# Patient Record
Sex: FEMALE | Race: WHITE | Employment: FULL TIME | URBAN - METROPOLITAN AREA
[De-identification: names, ages, dates, MRNs, and addresses within clinical notes are randomized per-mention and may not be internally consistent; named-entity substitution may affect disease eponyms.]

---

## 2017-01-01 ENCOUNTER — APPOINTMENT (OUTPATIENT)
Dept: NUCLEAR MEDICINE | Age: 57
DRG: 478 | End: 2017-01-01
Attending: NURSE PRACTITIONER
Payer: COMMERCIAL

## 2017-01-01 ENCOUNTER — HOSPITAL ENCOUNTER (OUTPATIENT)
Dept: GENERAL RADIOLOGY | Age: 57
Discharge: HOME | End: 2017-03-09
Payer: COMMERCIAL

## 2017-01-01 ENCOUNTER — APPOINTMENT (OUTPATIENT)
Dept: GENERAL RADIOLOGY | Age: 57
DRG: 542 | End: 2017-01-01
Attending: EMERGENCY MEDICINE
Payer: COMMERCIAL

## 2017-01-01 ENCOUNTER — APPOINTMENT (OUTPATIENT)
Dept: GENERAL RADIOLOGY | Age: 57
DRG: 478 | End: 2017-01-01
Attending: ORTHOPAEDIC SURGERY
Payer: COMMERCIAL

## 2017-01-01 ENCOUNTER — ANESTHESIA (OUTPATIENT)
Dept: SURGERY | Age: 57
DRG: 478 | End: 2017-01-01
Payer: COMMERCIAL

## 2017-01-01 ENCOUNTER — APPOINTMENT (OUTPATIENT)
Dept: ULTRASOUND IMAGING | Age: 57
DRG: 542 | End: 2017-01-01
Attending: INTERNAL MEDICINE
Payer: COMMERCIAL

## 2017-01-01 ENCOUNTER — TELEPHONE (OUTPATIENT)
Dept: INTERNAL MEDICINE CLINIC | Age: 57
End: 2017-01-01

## 2017-01-01 ENCOUNTER — APPOINTMENT (OUTPATIENT)
Dept: ULTRASOUND IMAGING | Age: 57
DRG: 478 | End: 2017-01-01
Attending: EMERGENCY MEDICINE
Payer: COMMERCIAL

## 2017-01-01 ENCOUNTER — APPOINTMENT (OUTPATIENT)
Dept: GENERAL RADIOLOGY | Age: 57
DRG: 478 | End: 2017-01-01
Attending: INTERNAL MEDICINE
Payer: COMMERCIAL

## 2017-01-01 ENCOUNTER — APPOINTMENT (OUTPATIENT)
Dept: CT IMAGING | Age: 57
DRG: 542 | End: 2017-01-01
Attending: PHYSICIAN ASSISTANT
Payer: COMMERCIAL

## 2017-01-01 ENCOUNTER — HOSPITAL ENCOUNTER (INPATIENT)
Age: 57
LOS: 7 days | Discharge: NURSING, REHAB OR LONG-TERM CARE | DRG: 478 | End: 2017-04-11
Attending: EMERGENCY MEDICINE | Admitting: INTERNAL MEDICINE
Payer: COMMERCIAL

## 2017-01-01 ENCOUNTER — HOSPITAL ENCOUNTER (INPATIENT)
Dept: RADIATION THERAPY | Age: 57
Discharge: HOME | DRG: 542 | End: 2017-05-05
Payer: COMMERCIAL

## 2017-01-01 ENCOUNTER — PATIENT OUTREACH (OUTPATIENT)
Dept: INTERNAL MEDICINE CLINIC | Age: 57
End: 2017-01-01

## 2017-01-01 ENCOUNTER — HOSPITAL ENCOUNTER (INPATIENT)
Dept: RADIATION THERAPY | Age: 57
Discharge: HOME | DRG: 542 | End: 2017-05-04
Payer: COMMERCIAL

## 2017-01-01 ENCOUNTER — HOSPITAL ENCOUNTER (INPATIENT)
Dept: RADIATION THERAPY | Age: 57
Discharge: HOME | DRG: 542 | End: 2017-05-02
Payer: COMMERCIAL

## 2017-01-01 ENCOUNTER — APPOINTMENT (OUTPATIENT)
Dept: CT IMAGING | Age: 57
DRG: 478 | End: 2017-01-01
Attending: EMERGENCY MEDICINE
Payer: COMMERCIAL

## 2017-01-01 ENCOUNTER — OFFICE VISIT (OUTPATIENT)
Dept: INTERNAL MEDICINE CLINIC | Age: 57
End: 2017-01-01

## 2017-01-01 ENCOUNTER — APPOINTMENT (OUTPATIENT)
Dept: GENERAL RADIOLOGY | Age: 57
DRG: 542 | End: 2017-01-01
Attending: INTERNAL MEDICINE
Payer: COMMERCIAL

## 2017-01-01 ENCOUNTER — HOSPITAL ENCOUNTER (INPATIENT)
Age: 57
LOS: 18 days | DRG: 542 | End: 2017-05-12
Attending: EMERGENCY MEDICINE | Admitting: FAMILY MEDICINE
Payer: COMMERCIAL

## 2017-01-01 ENCOUNTER — HOSPITAL ENCOUNTER (INPATIENT)
Dept: RADIATION THERAPY | Age: 57
Discharge: HOME | DRG: 542 | End: 2017-04-28
Payer: COMMERCIAL

## 2017-01-01 ENCOUNTER — APPOINTMENT (OUTPATIENT)
Dept: MRI IMAGING | Age: 57
DRG: 542 | End: 2017-01-01
Attending: INTERNAL MEDICINE
Payer: COMMERCIAL

## 2017-01-01 ENCOUNTER — APPOINTMENT (OUTPATIENT)
Dept: CT IMAGING | Age: 57
DRG: 542 | End: 2017-01-01
Attending: HOSPITALIST
Payer: COMMERCIAL

## 2017-01-01 ENCOUNTER — HOSPITAL ENCOUNTER (OUTPATIENT)
Dept: MAMMOGRAPHY | Age: 57
Discharge: HOME | End: 2017-01-26
Attending: SPECIALIST
Payer: COMMERCIAL

## 2017-01-01 ENCOUNTER — APPOINTMENT (OUTPATIENT)
Dept: CT IMAGING | Age: 57
DRG: 542 | End: 2017-01-01
Attending: INTERNAL MEDICINE
Payer: COMMERCIAL

## 2017-01-01 ENCOUNTER — ANESTHESIA EVENT (OUTPATIENT)
Dept: SURGERY | Age: 57
DRG: 478 | End: 2017-01-01
Payer: COMMERCIAL

## 2017-01-01 ENCOUNTER — HOSPITAL ENCOUNTER (INPATIENT)
Dept: RADIATION THERAPY | Age: 57
Discharge: HOME | DRG: 542 | End: 2017-05-03
Payer: COMMERCIAL

## 2017-01-01 ENCOUNTER — APPOINTMENT (OUTPATIENT)
Dept: CT IMAGING | Age: 57
DRG: 542 | End: 2017-01-01
Attending: RADIOLOGY
Payer: COMMERCIAL

## 2017-01-01 ENCOUNTER — APPOINTMENT (OUTPATIENT)
Dept: CT IMAGING | Age: 57
DRG: 542 | End: 2017-01-01
Attending: EMERGENCY MEDICINE
Payer: COMMERCIAL

## 2017-01-01 ENCOUNTER — HOSPITAL ENCOUNTER (INPATIENT)
Dept: RADIATION THERAPY | Age: 57
Discharge: HOME | DRG: 542 | End: 2017-05-08
Payer: COMMERCIAL

## 2017-01-01 VITALS
OXYGEN SATURATION: 97 % | HEIGHT: 62 IN | DIASTOLIC BLOOD PRESSURE: 65 MMHG | SYSTOLIC BLOOD PRESSURE: 101 MMHG | WEIGHT: 158.73 LBS | RESPIRATION RATE: 16 BRPM | TEMPERATURE: 98.2 F | BODY MASS INDEX: 29.21 KG/M2 | HEART RATE: 76 BPM

## 2017-01-01 VITALS
TEMPERATURE: 98.3 F | SYSTOLIC BLOOD PRESSURE: 118 MMHG | DIASTOLIC BLOOD PRESSURE: 76 MMHG | OXYGEN SATURATION: 98 % | RESPIRATION RATE: 17 BRPM | HEIGHT: 63 IN | HEART RATE: 75 BPM | BODY MASS INDEX: 30.44 KG/M2 | WEIGHT: 171.8 LBS

## 2017-01-01 VITALS
TEMPERATURE: 99.1 F | DIASTOLIC BLOOD PRESSURE: 57 MMHG | SYSTOLIC BLOOD PRESSURE: 76 MMHG | HEIGHT: 62 IN | OXYGEN SATURATION: 69 % | BODY MASS INDEX: 31.04 KG/M2 | WEIGHT: 168.65 LBS

## 2017-01-01 DIAGNOSIS — M84.551A PATHOLOGICAL FRACTURE OF RIGHT FEMUR DUE TO NEOPLASTIC DISEASE, INITIAL ENCOUNTER (HCC): Primary | ICD-10-CM

## 2017-01-01 DIAGNOSIS — M25.551 HIP PAIN, ACUTE, RIGHT: ICD-10-CM

## 2017-01-01 DIAGNOSIS — E83.52 HYPERCALCEMIA: Primary | ICD-10-CM

## 2017-01-01 DIAGNOSIS — R05.9 COUGH: ICD-10-CM

## 2017-01-01 DIAGNOSIS — S72.001A CLOSED FRACTURE OF NECK OF RIGHT FEMUR, INITIAL ENCOUNTER (HCC): ICD-10-CM

## 2017-01-01 DIAGNOSIS — R06.02 SHORTNESS OF BREATH: ICD-10-CM

## 2017-01-01 DIAGNOSIS — R53.81 DEBILITY: ICD-10-CM

## 2017-01-01 DIAGNOSIS — C34.31 MALIGNANT NEOPLASM OF LOWER LOBE OF RIGHT LUNG (HCC): ICD-10-CM

## 2017-01-01 DIAGNOSIS — J90 PLEURAL EFFUSION: ICD-10-CM

## 2017-01-01 DIAGNOSIS — Z12.31 VISIT FOR SCREENING MAMMOGRAM: ICD-10-CM

## 2017-01-01 DIAGNOSIS — S39.012A LUMBAR STRAIN, INITIAL ENCOUNTER: Primary | ICD-10-CM

## 2017-01-01 LAB
1,25(OH)2D3 SERPL-MCNC: <5 PG/ML (ref 19.9–79.3)
25(OH)D2 SERPL-MCNC: <1 NG/ML
25(OH)D3 SERPL-MCNC: 16 NG/ML
25(OH)D3+25(OH)D2 SERPL-MCNC: 17 NG/ML
ABO + RH BLD: NORMAL
ALBUMIN SERPL BCP-MCNC: 1.5 G/DL (ref 3.5–5)
ALBUMIN SERPL BCP-MCNC: 1.6 G/DL (ref 3.5–5)
ALBUMIN SERPL BCP-MCNC: 1.7 G/DL (ref 3.5–5)
ALBUMIN SERPL BCP-MCNC: 1.7 G/DL (ref 3.5–5)
ALBUMIN SERPL BCP-MCNC: 1.9 G/DL (ref 3.5–5)
ALBUMIN SERPL BCP-MCNC: 2.1 G/DL (ref 3.5–5)
ALBUMIN SERPL BCP-MCNC: 2.3 G/DL (ref 3.5–5)
ALBUMIN SERPL BCP-MCNC: 2.4 G/DL (ref 3.5–5)
ALBUMIN SERPL BCP-MCNC: 2.6 G/DL (ref 3.5–5)
ALBUMIN SERPL BCP-MCNC: 2.8 G/DL (ref 3.5–5)
ALBUMIN SERPL BCP-MCNC: 3.1 G/DL (ref 3.5–5)
ALBUMIN/GLOB SERPL: 0.4 {RATIO} (ref 1.1–2.2)
ALBUMIN/GLOB SERPL: 0.5 {RATIO} (ref 1.1–2.2)
ALBUMIN/GLOB SERPL: 0.6 {RATIO} (ref 1.1–2.2)
ALBUMIN/GLOB SERPL: 0.6 {RATIO} (ref 1.1–2.2)
ALBUMIN/GLOB SERPL: 0.7 {RATIO} (ref 1.1–2.2)
ALBUMIN/GLOB SERPL: 0.8 {RATIO} (ref 1.1–2.2)
ALP SERPL-CCNC: 104 U/L (ref 45–117)
ALP SERPL-CCNC: 105 U/L (ref 45–117)
ALP SERPL-CCNC: 115 U/L (ref 45–117)
ALP SERPL-CCNC: 56 U/L (ref 45–117)
ALP SERPL-CCNC: 63 U/L (ref 45–117)
ALP SERPL-CCNC: 66 U/L (ref 45–117)
ALP SERPL-CCNC: 72 U/L (ref 45–117)
ALP SERPL-CCNC: 76 U/L (ref 45–117)
ALP SERPL-CCNC: 80 U/L (ref 45–117)
ALP SERPL-CCNC: 84 U/L (ref 45–117)
ALP SERPL-CCNC: 87 U/L (ref 45–117)
ALP SERPL-CCNC: 88 U/L (ref 45–117)
ALT SERPL-CCNC: 11 U/L (ref 12–78)
ALT SERPL-CCNC: 11 U/L (ref 12–78)
ALT SERPL-CCNC: 12 U/L (ref 12–78)
ALT SERPL-CCNC: 13 U/L (ref 12–78)
ALT SERPL-CCNC: 13 U/L (ref 12–78)
ALT SERPL-CCNC: 15 U/L (ref 12–78)
ALT SERPL-CCNC: 16 U/L (ref 12–78)
ALT SERPL-CCNC: 18 U/L (ref 12–78)
ALT SERPL-CCNC: 21 U/L (ref 12–78)
ALT SERPL-CCNC: 22 U/L (ref 12–78)
AMMONIA PLAS-SCNC: <10 UMOL/L
ANION GAP BLD CALC-SCNC: 10 MMOL/L (ref 5–15)
ANION GAP BLD CALC-SCNC: 11 MMOL/L (ref 5–15)
ANION GAP BLD CALC-SCNC: 11 MMOL/L (ref 5–15)
ANION GAP BLD CALC-SCNC: 14 MMOL/L (ref 5–15)
ANION GAP BLD CALC-SCNC: 6 MMOL/L (ref 5–15)
ANION GAP BLD CALC-SCNC: 7 MMOL/L (ref 5–15)
ANION GAP BLD CALC-SCNC: 7 MMOL/L (ref 5–15)
ANION GAP BLD CALC-SCNC: 8 MMOL/L (ref 5–15)
ANION GAP BLD CALC-SCNC: 9 MMOL/L (ref 5–15)
APPEARANCE UR: CLEAR
APPEARANCE UR: CLEAR
APTT PPP: 27.3 SEC (ref 22.1–32.5)
APTT PPP: 30.6 SEC (ref 22.1–32.5)
APTT PPP: 31.6 SEC (ref 22.1–32.5)
APTT PPP: 35.8 SEC (ref 22.1–32.5)
ARTERIAL PATENCY WRIST A: YES
AST SERPL W P-5'-P-CCNC: 10 U/L (ref 15–37)
AST SERPL W P-5'-P-CCNC: 14 U/L (ref 15–37)
AST SERPL W P-5'-P-CCNC: 16 U/L (ref 15–37)
AST SERPL W P-5'-P-CCNC: 16 U/L (ref 15–37)
AST SERPL W P-5'-P-CCNC: 17 U/L (ref 15–37)
AST SERPL W P-5'-P-CCNC: 17 U/L (ref 15–37)
AST SERPL W P-5'-P-CCNC: 18 U/L (ref 15–37)
AST SERPL W P-5'-P-CCNC: 18 U/L (ref 15–37)
AST SERPL W P-5'-P-CCNC: 20 U/L (ref 15–37)
AST SERPL W P-5'-P-CCNC: 22 U/L (ref 15–37)
AST SERPL W P-5'-P-CCNC: 26 U/L (ref 15–37)
AST SERPL W P-5'-P-CCNC: 32 U/L (ref 15–37)
ATRIAL RATE: 136 BPM
ATRIAL RATE: 95 BPM
BACTERIA SPEC CULT: NORMAL
BACTERIA SPEC CULT: NORMAL
BACTERIA URNS QL MICRO: ABNORMAL /HPF
BACTERIA URNS QL MICRO: NEGATIVE /HPF
BASE DEFICIT BLD-SCNC: 6 MMOL/L
BASE DEFICIT BLD-SCNC: 8 MMOL/L
BASE DEFICIT BLD-SCNC: 8 MMOL/L
BASE DEFICIT BLDV-SCNC: 6 MMOL/L
BASOPHILS # BLD AUTO: 0 K/UL (ref 0–0.1)
BASOPHILS # BLD: 0 % (ref 0–1)
BDY SITE: ABNORMAL
BILIRUB SERPL-MCNC: 0.3 MG/DL (ref 0.2–1)
BILIRUB SERPL-MCNC: 0.4 MG/DL (ref 0.2–1)
BILIRUB SERPL-MCNC: 0.5 MG/DL (ref 0.2–1)
BILIRUB SERPL-MCNC: 0.6 MG/DL (ref 0.2–1)
BILIRUB UR QL CFM: NEGATIVE
BILIRUB UR QL: NEGATIVE
BLASTS NFR BLD: 0 %
BLD PROD TYP BPU: NORMAL
BLOOD GROUP ANTIBODIES SERPL: NORMAL
BNP SERPL-MCNC: 23 PG/ML (ref 0–100)
BPU ID: NORMAL
BUN SERPL-MCNC: 12 MG/DL (ref 6–20)
BUN SERPL-MCNC: 13 MG/DL (ref 6–20)
BUN SERPL-MCNC: 14 MG/DL (ref 6–20)
BUN SERPL-MCNC: 15 MG/DL (ref 6–20)
BUN SERPL-MCNC: 16 MG/DL (ref 6–20)
BUN SERPL-MCNC: 17 MG/DL (ref 6–20)
BUN SERPL-MCNC: 18 MG/DL (ref 6–20)
BUN SERPL-MCNC: 19 MG/DL (ref 6–20)
BUN SERPL-MCNC: 20 MG/DL (ref 6–20)
BUN SERPL-MCNC: 21 MG/DL (ref 6–20)
BUN SERPL-MCNC: 21 MG/DL (ref 6–20)
BUN SERPL-MCNC: 3 MG/DL (ref 6–20)
BUN SERPL-MCNC: 4 MG/DL (ref 6–20)
BUN SERPL-MCNC: 5 MG/DL (ref 6–20)
BUN SERPL-MCNC: 6 MG/DL (ref 6–20)
BUN SERPL-MCNC: 6 MG/DL (ref 6–20)
BUN SERPL-MCNC: 7 MG/DL (ref 6–20)
BUN SERPL-MCNC: 8 MG/DL (ref 6–20)
BUN SERPL-MCNC: 9 MG/DL (ref 6–20)
BUN/CREAT SERPL: 10 (ref 12–20)
BUN/CREAT SERPL: 11 (ref 12–20)
BUN/CREAT SERPL: 11 (ref 12–20)
BUN/CREAT SERPL: 13 (ref 12–20)
BUN/CREAT SERPL: 13 (ref 12–20)
BUN/CREAT SERPL: 15 (ref 12–20)
BUN/CREAT SERPL: 16 (ref 12–20)
BUN/CREAT SERPL: 17 (ref 12–20)
BUN/CREAT SERPL: 18 (ref 12–20)
BUN/CREAT SERPL: 18 (ref 12–20)
BUN/CREAT SERPL: 19 (ref 12–20)
BUN/CREAT SERPL: 20 (ref 12–20)
BUN/CREAT SERPL: 22 (ref 12–20)
BUN/CREAT SERPL: 23 (ref 12–20)
BUN/CREAT SERPL: 8 (ref 12–20)
BUN/CREAT SERPL: 8 (ref 12–20)
BUN/CREAT SERPL: 9 (ref 12–20)
CA-I BLD-SCNC: 1.03 MMOL/L (ref 1.12–1.32)
CALCIUM SERPL-MCNC: 10.6 MG/DL (ref 8.5–10.1)
CALCIUM SERPL-MCNC: 12 MG/DL (ref 8.5–10.1)
CALCIUM SERPL-MCNC: 14.2 MG/DL (ref 8.5–10.1)
CALCIUM SERPL-MCNC: 14.2 MG/DL (ref 8.5–10.1)
CALCIUM SERPL-MCNC: 5.7 MG/DL (ref 8.5–10.1)
CALCIUM SERPL-MCNC: 6.2 MG/DL (ref 8.5–10.1)
CALCIUM SERPL-MCNC: 6.3 MG/DL (ref 8.5–10.1)
CALCIUM SERPL-MCNC: 6.7 MG/DL (ref 8.5–10.1)
CALCIUM SERPL-MCNC: 6.8 MG/DL (ref 8.5–10.1)
CALCIUM SERPL-MCNC: 7.3 MG/DL (ref 8.5–10.1)
CALCIUM SERPL-MCNC: 8 MG/DL (ref 8.5–10.1)
CALCIUM SERPL-MCNC: 8.2 MG/DL (ref 8.5–10.1)
CALCIUM SERPL-MCNC: 8.4 MG/DL (ref 8.5–10.1)
CALCIUM SERPL-MCNC: 8.5 MG/DL (ref 8.5–10.1)
CALCIUM SERPL-MCNC: 8.6 MG/DL (ref 8.5–10.1)
CALCIUM SERPL-MCNC: 8.6 MG/DL (ref 8.5–10.1)
CALCIUM SERPL-MCNC: 8.7 MG/DL (ref 8.5–10.1)
CALCIUM SERPL-MCNC: 9.1 MG/DL (ref 8.5–10.1)
CALCIUM SERPL-MCNC: 9.4 MG/DL (ref 8.5–10.1)
CALCULATED P AXIS, ECG09: 16 DEGREES
CALCULATED P AXIS, ECG09: 61 DEGREES
CALCULATED R AXIS, ECG10: -14 DEGREES
CALCULATED R AXIS, ECG10: -14 DEGREES
CALCULATED T AXIS, ECG11: 17 DEGREES
CALCULATED T AXIS, ECG11: 35 DEGREES
CHLORIDE SERPL-SCNC: 100 MMOL/L (ref 97–108)
CHLORIDE SERPL-SCNC: 101 MMOL/L (ref 97–108)
CHLORIDE SERPL-SCNC: 102 MMOL/L (ref 97–108)
CHLORIDE SERPL-SCNC: 103 MMOL/L (ref 97–108)
CHLORIDE SERPL-SCNC: 103 MMOL/L (ref 97–108)
CHLORIDE SERPL-SCNC: 104 MMOL/L (ref 97–108)
CHLORIDE SERPL-SCNC: 105 MMOL/L (ref 97–108)
CHLORIDE SERPL-SCNC: 105 MMOL/L (ref 97–108)
CHLORIDE SERPL-SCNC: 106 MMOL/L (ref 97–108)
CHLORIDE SERPL-SCNC: 107 MMOL/L (ref 97–108)
CHLORIDE SERPL-SCNC: 108 MMOL/L (ref 97–108)
CHLORIDE SERPL-SCNC: 108 MMOL/L (ref 97–108)
CHLORIDE SERPL-SCNC: 96 MMOL/L (ref 97–108)
CO2 SERPL-SCNC: 21 MMOL/L (ref 21–32)
CO2 SERPL-SCNC: 23 MMOL/L (ref 21–32)
CO2 SERPL-SCNC: 24 MMOL/L (ref 21–32)
CO2 SERPL-SCNC: 25 MMOL/L (ref 21–32)
CO2 SERPL-SCNC: 25 MMOL/L (ref 21–32)
CO2 SERPL-SCNC: 26 MMOL/L (ref 21–32)
CO2 SERPL-SCNC: 27 MMOL/L (ref 21–32)
CO2 SERPL-SCNC: 28 MMOL/L (ref 21–32)
COLOR UR: ABNORMAL
COLOR UR: ABNORMAL
CREAT SERPL-MCNC: 0.4 MG/DL (ref 0.55–1.02)
CREAT SERPL-MCNC: 0.44 MG/DL (ref 0.55–1.02)
CREAT SERPL-MCNC: 0.55 MG/DL (ref 0.55–1.02)
CREAT SERPL-MCNC: 0.57 MG/DL (ref 0.55–1.02)
CREAT SERPL-MCNC: 0.62 MG/DL (ref 0.55–1.02)
CREAT SERPL-MCNC: 0.62 MG/DL (ref 0.55–1.02)
CREAT SERPL-MCNC: 0.63 MG/DL (ref 0.55–1.02)
CREAT SERPL-MCNC: 0.65 MG/DL (ref 0.55–1.02)
CREAT SERPL-MCNC: 0.73 MG/DL (ref 0.55–1.02)
CREAT SERPL-MCNC: 0.76 MG/DL (ref 0.55–1.02)
CREAT SERPL-MCNC: 0.78 MG/DL (ref 0.55–1.02)
CREAT SERPL-MCNC: 0.84 MG/DL (ref 0.55–1.02)
CREAT SERPL-MCNC: 0.88 MG/DL (ref 0.55–1.02)
CREAT SERPL-MCNC: 0.91 MG/DL (ref 0.55–1.02)
CREAT SERPL-MCNC: 0.92 MG/DL (ref 0.55–1.02)
CREAT SERPL-MCNC: 0.93 MG/DL (ref 0.55–1.02)
CREAT SERPL-MCNC: 0.94 MG/DL (ref 0.55–1.02)
CREAT SERPL-MCNC: 0.96 MG/DL (ref 0.55–1.02)
CREAT SERPL-MCNC: 1.01 MG/DL (ref 0.55–1.02)
CREAT SERPL-MCNC: 1.01 MG/DL (ref 0.55–1.02)
CREAT SERPL-MCNC: 1.18 MG/DL (ref 0.55–1.02)
CROSSMATCH RESULT,%XM: NORMAL
CROSSMATCH RESULT,%XM: NORMAL
D DIMER PPP FEU-MCNC: 0.31 MG/L FEU (ref 0–0.65)
D DIMER PPP FEU-MCNC: 3.94 MG/L FEU (ref 0–0.65)
DIAGNOSIS, 93000: NORMAL
DIAGNOSIS, 93000: NORMAL
DIFFERENTIAL METHOD BLD: ABNORMAL
EOSINOPHIL # BLD: 0 K/UL (ref 0–0.4)
EOSINOPHIL # BLD: 0.1 K/UL (ref 0–0.4)
EOSINOPHIL # BLD: 0.3 K/UL (ref 0–0.4)
EOSINOPHIL # BLD: 0.3 K/UL (ref 0–0.4)
EOSINOPHIL # BLD: 0.4 K/UL (ref 0–0.4)
EOSINOPHIL # BLD: 0.5 K/UL (ref 0–0.4)
EOSINOPHIL # BLD: 0.8 K/UL (ref 0–0.4)
EOSINOPHIL NFR BLD: 0 % (ref 0–7)
EOSINOPHIL NFR BLD: 1 % (ref 0–7)
EOSINOPHIL NFR BLD: 2 % (ref 0–7)
EOSINOPHIL NFR BLD: 2 % (ref 0–7)
EOSINOPHIL NFR BLD: 3 % (ref 0–7)
EOSINOPHIL NFR BLD: 4 % (ref 0–7)
EOSINOPHIL NFR BLD: 4 % (ref 0–7)
EPITH CASTS URNS QL MICRO: ABNORMAL /LPF
EPITH CASTS URNS QL MICRO: ABNORMAL /LPF
ERYTHROCYTE [DISTWIDTH] IN BLOOD BY AUTOMATED COUNT: 15.5 % (ref 11.5–14.5)
ERYTHROCYTE [DISTWIDTH] IN BLOOD BY AUTOMATED COUNT: 15.6 % (ref 11.5–14.5)
ERYTHROCYTE [DISTWIDTH] IN BLOOD BY AUTOMATED COUNT: 15.7 % (ref 11.5–14.5)
ERYTHROCYTE [DISTWIDTH] IN BLOOD BY AUTOMATED COUNT: 15.8 % (ref 11.5–14.5)
ERYTHROCYTE [DISTWIDTH] IN BLOOD BY AUTOMATED COUNT: 15.9 % (ref 11.5–14.5)
ERYTHROCYTE [DISTWIDTH] IN BLOOD BY AUTOMATED COUNT: 15.9 % (ref 11.5–14.5)
ERYTHROCYTE [DISTWIDTH] IN BLOOD BY AUTOMATED COUNT: 16 % (ref 11.5–14.5)
ERYTHROCYTE [DISTWIDTH] IN BLOOD BY AUTOMATED COUNT: 16.4 % (ref 11.5–14.5)
ERYTHROCYTE [DISTWIDTH] IN BLOOD BY AUTOMATED COUNT: 16.9 % (ref 11.5–14.5)
ERYTHROCYTE [DISTWIDTH] IN BLOOD BY AUTOMATED COUNT: 17 % (ref 11.5–14.5)
ERYTHROCYTE [DISTWIDTH] IN BLOOD BY AUTOMATED COUNT: 17 % (ref 11.5–14.5)
ERYTHROCYTE [DISTWIDTH] IN BLOOD BY AUTOMATED COUNT: 17.2 % (ref 11.5–14.5)
ERYTHROCYTE [DISTWIDTH] IN BLOOD BY AUTOMATED COUNT: 17.7 % (ref 11.5–14.5)
ERYTHROCYTE [DISTWIDTH] IN BLOOD BY AUTOMATED COUNT: 18.2 % (ref 11.5–14.5)
EST. AVERAGE GLUCOSE BLD GHB EST-MCNC: 123 MG/DL
FIBRINOGEN PPP-MCNC: 387 MG/DL (ref 200–475)
GAS FLOW.O2 O2 DELIVERY SYS: ABNORMAL L/MIN
GAS FLOW.O2 SETTING OXYMISER: 15 L/M
GAS FLOW.O2 SETTING OXYMISER: 60 L/M
GLOBULIN SER CALC-MCNC: 3.5 G/DL (ref 2–4)
GLOBULIN SER CALC-MCNC: 3.7 G/DL (ref 2–4)
GLOBULIN SER CALC-MCNC: 3.9 G/DL (ref 2–4)
GLOBULIN SER CALC-MCNC: 4 G/DL (ref 2–4)
GLOBULIN SER CALC-MCNC: 4.3 G/DL (ref 2–4)
GLOBULIN SER CALC-MCNC: 4.4 G/DL (ref 2–4)
GLOBULIN SER CALC-MCNC: 4.4 G/DL (ref 2–4)
GLOBULIN SER CALC-MCNC: 4.5 G/DL (ref 2–4)
GLOBULIN SER CALC-MCNC: 4.7 G/DL (ref 2–4)
GLOBULIN SER CALC-MCNC: 4.9 G/DL (ref 2–4)
GLOBULIN SER CALC-MCNC: 5.2 G/DL (ref 2–4)
GLOBULIN SER CALC-MCNC: 5.3 G/DL (ref 2–4)
GLUCOSE BLD STRIP.AUTO-MCNC: 105 MG/DL (ref 65–100)
GLUCOSE BLD STRIP.AUTO-MCNC: 120 MG/DL (ref 65–100)
GLUCOSE SERPL-MCNC: 101 MG/DL (ref 65–100)
GLUCOSE SERPL-MCNC: 107 MG/DL (ref 65–100)
GLUCOSE SERPL-MCNC: 114 MG/DL (ref 65–100)
GLUCOSE SERPL-MCNC: 117 MG/DL (ref 65–100)
GLUCOSE SERPL-MCNC: 119 MG/DL (ref 65–100)
GLUCOSE SERPL-MCNC: 120 MG/DL (ref 65–100)
GLUCOSE SERPL-MCNC: 129 MG/DL (ref 65–100)
GLUCOSE SERPL-MCNC: 130 MG/DL (ref 65–100)
GLUCOSE SERPL-MCNC: 136 MG/DL (ref 65–100)
GLUCOSE SERPL-MCNC: 137 MG/DL (ref 65–100)
GLUCOSE SERPL-MCNC: 153 MG/DL (ref 65–100)
GLUCOSE SERPL-MCNC: 84 MG/DL (ref 65–100)
GLUCOSE SERPL-MCNC: 86 MG/DL (ref 65–100)
GLUCOSE SERPL-MCNC: 87 MG/DL (ref 65–100)
GLUCOSE SERPL-MCNC: 88 MG/DL (ref 65–100)
GLUCOSE SERPL-MCNC: 89 MG/DL (ref 65–100)
GLUCOSE SERPL-MCNC: 90 MG/DL (ref 65–100)
GLUCOSE SERPL-MCNC: 91 MG/DL (ref 65–100)
GLUCOSE SERPL-MCNC: 92 MG/DL (ref 65–100)
GLUCOSE SERPL-MCNC: 92 MG/DL (ref 65–100)
GLUCOSE SERPL-MCNC: 99 MG/DL (ref 65–100)
GLUCOSE UR STRIP.AUTO-MCNC: NEGATIVE MG/DL
GLUCOSE UR STRIP.AUTO-MCNC: NEGATIVE MG/DL
HAPTOGLOB SERPL-MCNC: 220 MG/DL (ref 30–200)
HBA1C MFR BLD: 5.9 % (ref 4.2–6.3)
HCO3 BLD-SCNC: 19.4 MMOL/L (ref 22–26)
HCO3 BLD-SCNC: 19.6 MMOL/L (ref 22–26)
HCO3 BLD-SCNC: 19.8 MMOL/L (ref 22–26)
HCO3 BLDV-SCNC: 20.1 MMOL/L (ref 23–28)
HCT VFR BLD AUTO: 22.6 % (ref 35–47)
HCT VFR BLD AUTO: 23.2 % (ref 35–47)
HCT VFR BLD AUTO: 23.4 % (ref 35–47)
HCT VFR BLD AUTO: 25 % (ref 35–47)
HCT VFR BLD AUTO: 25.1 % (ref 35–47)
HCT VFR BLD AUTO: 25.8 % (ref 35–47)
HCT VFR BLD AUTO: 27.1 % (ref 35–47)
HCT VFR BLD AUTO: 27.8 % (ref 35–47)
HCT VFR BLD AUTO: 28.5 % (ref 35–47)
HCT VFR BLD AUTO: 28.7 % (ref 35–47)
HCT VFR BLD AUTO: 29.2 % (ref 35–47)
HCT VFR BLD AUTO: 29.4 % (ref 35–47)
HCT VFR BLD AUTO: 29.6 % (ref 35–47)
HCT VFR BLD AUTO: 30.7 % (ref 35–47)
HCT VFR BLD AUTO: 31.8 % (ref 35–47)
HCT VFR BLD AUTO: 32.8 % (ref 35–47)
HCT VFR BLD AUTO: 33 % (ref 35–47)
HCT VFR BLD AUTO: 33 % (ref 35–47)
HGB BLD-MCNC: 10.2 G/DL (ref 11.5–16)
HGB BLD-MCNC: 10.4 G/DL (ref 11.5–16)
HGB BLD-MCNC: 10.7 G/DL (ref 11.5–16)
HGB BLD-MCNC: 10.8 G/DL (ref 11.5–16)
HGB BLD-MCNC: 7.2 G/DL (ref 11.5–16)
HGB BLD-MCNC: 7.3 G/DL (ref 11.5–16)
HGB BLD-MCNC: 7.6 G/DL (ref 11.5–16)
HGB BLD-MCNC: 8 G/DL (ref 11.5–16)
HGB BLD-MCNC: 8 G/DL (ref 11.5–16)
HGB BLD-MCNC: 8.3 G/DL (ref 11.5–16)
HGB BLD-MCNC: 8.5 G/DL (ref 11.5–16)
HGB BLD-MCNC: 8.7 G/DL (ref 11.5–16)
HGB BLD-MCNC: 8.9 G/DL (ref 11.5–16)
HGB BLD-MCNC: 9 G/DL (ref 11.5–16)
HGB BLD-MCNC: 9.2 G/DL (ref 11.5–16)
HGB BLD-MCNC: 9.4 G/DL (ref 11.5–16)
HGB BLD-MCNC: 9.5 G/DL (ref 11.5–16)
HGB BLD-MCNC: 9.9 G/DL (ref 11.5–16)
HGB UR QL STRIP: ABNORMAL
HGB UR QL STRIP: ABNORMAL
INR BLD: 1.4 (ref 0.9–1.2)
INR PPP: 1.2 (ref 0.9–1.1)
INR PPP: 1.3 (ref 0.9–1.1)
INR PPP: 1.3 (ref 0.9–1.1)
INR PPP: 1.5 (ref 0.9–1.1)
KETONES UR QL STRIP.AUTO: ABNORMAL MG/DL
KETONES UR QL STRIP.AUTO: NEGATIVE MG/DL
LACTATE SERPL-SCNC: 1.4 MMOL/L (ref 0.4–2)
LDH SERPL L TO P-CCNC: 548 U/L (ref 81–246)
LDH SERPL L TO P-CCNC: 689 U/L (ref 81–246)
LEUKOCYTE ESTERASE UR QL STRIP.AUTO: NEGATIVE
LEUKOCYTE ESTERASE UR QL STRIP.AUTO: NEGATIVE
LYMPHOCYTES # BLD AUTO: 12 % (ref 12–49)
LYMPHOCYTES # BLD AUTO: 16 % (ref 12–49)
LYMPHOCYTES # BLD AUTO: 16 % (ref 12–49)
LYMPHOCYTES # BLD AUTO: 18 % (ref 12–49)
LYMPHOCYTES # BLD AUTO: 19 % (ref 12–49)
LYMPHOCYTES # BLD AUTO: 4 % (ref 12–49)
LYMPHOCYTES # BLD AUTO: 4 % (ref 12–49)
LYMPHOCYTES # BLD AUTO: 7 % (ref 12–49)
LYMPHOCYTES # BLD AUTO: 8 % (ref 12–49)
LYMPHOCYTES # BLD AUTO: 8 % (ref 12–49)
LYMPHOCYTES # BLD AUTO: 9 % (ref 12–49)
LYMPHOCYTES # BLD AUTO: 9 % (ref 12–49)
LYMPHOCYTES # BLD: 0.8 K/UL (ref 0.8–3.5)
LYMPHOCYTES # BLD: 0.9 K/UL (ref 0.8–3.5)
LYMPHOCYTES # BLD: 1 K/UL (ref 0.8–3.5)
LYMPHOCYTES # BLD: 1.3 K/UL (ref 0.8–3.5)
LYMPHOCYTES # BLD: 1.3 K/UL (ref 0.8–3.5)
LYMPHOCYTES # BLD: 1.4 K/UL (ref 0.8–3.5)
LYMPHOCYTES # BLD: 1.5 K/UL (ref 0.8–3.5)
LYMPHOCYTES # BLD: 1.6 K/UL (ref 0.8–3.5)
MAGNESIUM SERPL-MCNC: 0.8 MG/DL (ref 1.6–2.4)
MAGNESIUM SERPL-MCNC: 1 MG/DL (ref 1.6–2.4)
MAGNESIUM SERPL-MCNC: 1 MG/DL (ref 1.6–2.4)
MAGNESIUM SERPL-MCNC: 1.3 MG/DL (ref 1.6–2.4)
MAGNESIUM SERPL-MCNC: 1.4 MG/DL (ref 1.6–2.4)
MAGNESIUM SERPL-MCNC: 1.5 MG/DL (ref 1.6–2.4)
MAGNESIUM SERPL-MCNC: 1.5 MG/DL (ref 1.6–2.4)
MAGNESIUM SERPL-MCNC: 1.7 MG/DL (ref 1.6–2.4)
MANUAL DIFFERENTIAL PERFORMED BLD QL: ABNORMAL
MCH RBC QN AUTO: 29.5 PG (ref 26–34)
MCH RBC QN AUTO: 30.2 PG (ref 26–34)
MCH RBC QN AUTO: 30.3 PG (ref 26–34)
MCH RBC QN AUTO: 30.3 PG (ref 26–34)
MCH RBC QN AUTO: 30.6 PG (ref 26–34)
MCH RBC QN AUTO: 30.6 PG (ref 26–34)
MCH RBC QN AUTO: 30.7 PG (ref 26–34)
MCH RBC QN AUTO: 31 PG (ref 26–34)
MCH RBC QN AUTO: 31 PG (ref 26–34)
MCH RBC QN AUTO: 31.1 PG (ref 26–34)
MCH RBC QN AUTO: 31.2 PG (ref 26–34)
MCH RBC QN AUTO: 31.2 PG (ref 26–34)
MCH RBC QN AUTO: 31.3 PG (ref 26–34)
MCH RBC QN AUTO: 31.9 PG (ref 26–34)
MCHC RBC AUTO-ENTMCNC: 30.8 G/DL (ref 30–36.5)
MCHC RBC AUTO-ENTMCNC: 31.2 G/DL (ref 30–36.5)
MCHC RBC AUTO-ENTMCNC: 31.2 G/DL (ref 30–36.5)
MCHC RBC AUTO-ENTMCNC: 31.3 G/DL (ref 30–36.5)
MCHC RBC AUTO-ENTMCNC: 31.4 G/DL (ref 30–36.5)
MCHC RBC AUTO-ENTMCNC: 31.5 G/DL (ref 30–36.5)
MCHC RBC AUTO-ENTMCNC: 31.8 G/DL (ref 30–36.5)
MCHC RBC AUTO-ENTMCNC: 31.9 G/DL (ref 30–36.5)
MCHC RBC AUTO-ENTMCNC: 31.9 G/DL (ref 30–36.5)
MCHC RBC AUTO-ENTMCNC: 32 G/DL (ref 30–36.5)
MCHC RBC AUTO-ENTMCNC: 32.1 G/DL (ref 30–36.5)
MCHC RBC AUTO-ENTMCNC: 32.1 G/DL (ref 30–36.5)
MCHC RBC AUTO-ENTMCNC: 32.2 G/DL (ref 30–36.5)
MCHC RBC AUTO-ENTMCNC: 32.2 G/DL (ref 30–36.5)
MCHC RBC AUTO-ENTMCNC: 32.3 G/DL (ref 30–36.5)
MCHC RBC AUTO-ENTMCNC: 32.4 G/DL (ref 30–36.5)
MCHC RBC AUTO-ENTMCNC: 32.8 G/DL (ref 30–36.5)
MCHC RBC AUTO-ENTMCNC: 32.9 G/DL (ref 30–36.5)
MCV RBC AUTO: 91.8 FL (ref 80–99)
MCV RBC AUTO: 93.9 FL (ref 80–99)
MCV RBC AUTO: 94.8 FL (ref 80–99)
MCV RBC AUTO: 96.2 FL (ref 80–99)
MCV RBC AUTO: 96.6 FL (ref 80–99)
MCV RBC AUTO: 96.7 FL (ref 80–99)
MCV RBC AUTO: 97.2 FL (ref 80–99)
MCV RBC AUTO: 97.3 FL (ref 80–99)
MCV RBC AUTO: 97.5 FL (ref 80–99)
MCV RBC AUTO: 97.9 FL (ref 80–99)
MCV RBC AUTO: 98 FL (ref 80–99)
MCV RBC AUTO: 98 FL (ref 80–99)
MCV RBC AUTO: 98.2 FL (ref 80–99)
MCV RBC AUTO: 98.3 FL (ref 80–99)
MCV RBC AUTO: 98.3 FL (ref 80–99)
MCV RBC AUTO: 98.5 FL (ref 80–99)
MCV RBC AUTO: 98.7 FL (ref 80–99)
MCV RBC AUTO: 99.3 FL (ref 80–99)
METAMYELOCYTES NFR BLD MANUAL: 0 %
MONOCYTES # BLD: 0.6 K/UL (ref 0–1)
MONOCYTES # BLD: 0.6 K/UL (ref 0–1)
MONOCYTES # BLD: 0.7 K/UL (ref 0–1)
MONOCYTES # BLD: 0.8 K/UL (ref 0–1)
MONOCYTES # BLD: 0.9 K/UL (ref 0–1)
MONOCYTES # BLD: 1.2 K/UL (ref 0–1)
MONOCYTES # BLD: 1.3 K/UL (ref 0–1)
MONOCYTES # BLD: 1.3 K/UL (ref 0–1)
MONOCYTES # BLD: 1.5 K/UL (ref 0–1)
MONOCYTES # BLD: 1.8 K/UL (ref 0–1)
MONOCYTES # BLD: 1.9 K/UL (ref 0–1)
MONOCYTES # BLD: 2 K/UL (ref 0–1)
MONOCYTES NFR BLD AUTO: 10 % (ref 5–13)
MONOCYTES NFR BLD AUTO: 11 % (ref 5–13)
MONOCYTES NFR BLD AUTO: 13 % (ref 5–13)
MONOCYTES NFR BLD AUTO: 14 % (ref 5–13)
MONOCYTES NFR BLD AUTO: 16 % (ref 5–13)
MONOCYTES NFR BLD AUTO: 6 % (ref 5–13)
MONOCYTES NFR BLD AUTO: 7 % (ref 5–13)
MONOCYTES NFR BLD AUTO: 8 % (ref 5–13)
MONOCYTES NFR BLD AUTO: 8 % (ref 5–13)
MONOCYTES NFR BLD AUTO: 9 % (ref 5–13)
MYELOCYTES NFR BLD MANUAL: 0 %
NEUTS BAND NFR BLD MANUAL: 0 % (ref 0–6)
NEUTS BAND NFR BLD MANUAL: 3 % (ref 0–6)
NEUTS BAND NFR BLD MANUAL: 5 % (ref 0–6)
NEUTS BAND NFR BLD MANUAL: 6 % (ref 0–6)
NEUTS SEG # BLD: 13.5 K/UL (ref 1.8–8)
NEUTS SEG # BLD: 16.2 K/UL (ref 1.8–8)
NEUTS SEG # BLD: 17.9 K/UL (ref 1.8–8)
NEUTS SEG # BLD: 18.8 K/UL (ref 1.8–8)
NEUTS SEG # BLD: 4.5 K/UL (ref 1.8–8)
NEUTS SEG # BLD: 5.3 K/UL (ref 1.8–8)
NEUTS SEG # BLD: 5.6 K/UL (ref 1.8–8)
NEUTS SEG # BLD: 6.2 K/UL (ref 1.8–8)
NEUTS SEG # BLD: 6.5 K/UL (ref 1.8–8)
NEUTS SEG # BLD: 8.5 K/UL (ref 1.8–8)
NEUTS SEG # BLD: 8.7 K/UL (ref 1.8–8)
NEUTS SEG # BLD: 8.8 K/UL (ref 1.8–8)
NEUTS SEG NFR BLD AUTO: 67 % (ref 32–75)
NEUTS SEG NFR BLD AUTO: 69 % (ref 32–75)
NEUTS SEG NFR BLD AUTO: 72 % (ref 32–75)
NEUTS SEG NFR BLD AUTO: 74 % (ref 32–75)
NEUTS SEG NFR BLD AUTO: 75 % (ref 32–75)
NEUTS SEG NFR BLD AUTO: 76 % (ref 32–75)
NEUTS SEG NFR BLD AUTO: 78 % (ref 32–75)
NEUTS SEG NFR BLD AUTO: 78 % (ref 32–75)
NEUTS SEG NFR BLD AUTO: 81 % (ref 32–75)
NEUTS SEG NFR BLD AUTO: 81 % (ref 32–75)
NEUTS SEG NFR BLD AUTO: 82 % (ref 32–75)
NEUTS SEG NFR BLD AUTO: 85 % (ref 32–75)
NITRITE UR QL STRIP.AUTO: NEGATIVE
NITRITE UR QL STRIP.AUTO: NEGATIVE
NRBC # BLD: 0 K/UL (ref 0–0.01)
NRBC BLD-RTO: 0 PER 100 WBC
O2/TOTAL GAS SETTING VFR VENT: 100 %
P-R INTERVAL, ECG05: 122 MS
P-R INTERVAL, ECG05: 136 MS
PATH REV BLD -IMP: ABNORMAL
PCO2 BLD: 36.2 MMHG (ref 35–45)
PCO2 BLD: 47.2 MMHG (ref 35–45)
PCO2 BLD: 48.2 MMHG (ref 35–45)
PCO2 BLDV: 39.1 MMHG (ref 41–51)
PF4 HEPARIN CMPLX AB SER-ACNC: 0.86 OD (ref 0–0.4)
PH BLD: 7.22 [PH] (ref 7.35–7.45)
PH BLD: 7.22 [PH] (ref 7.35–7.45)
PH BLD: 7.34 [PH] (ref 7.35–7.45)
PH BLDV: 7.32 [PH] (ref 7.32–7.42)
PH UR STRIP: 6 [PH] (ref 5–8)
PH UR STRIP: 6.5 [PH] (ref 5–8)
PHOSPHATE SERPL-MCNC: 1.5 MG/DL (ref 2.6–4.7)
PHOSPHATE SERPL-MCNC: 1.9 MG/DL (ref 2.6–4.7)
PHOSPHATE SERPL-MCNC: 2.5 MG/DL (ref 2.6–4.7)
PLATELET # BLD AUTO: 15 K/UL (ref 150–400)
PLATELET # BLD AUTO: 165 K/UL (ref 150–400)
PLATELET # BLD AUTO: 17 K/UL (ref 150–400)
PLATELET # BLD AUTO: 18 K/UL (ref 150–400)
PLATELET # BLD AUTO: 2 K/UL (ref 150–400)
PLATELET # BLD AUTO: 204 K/UL (ref 150–400)
PLATELET # BLD AUTO: 21 K/UL (ref 150–400)
PLATELET # BLD AUTO: 215 K/UL (ref 150–400)
PLATELET # BLD AUTO: 232 K/UL (ref 150–400)
PLATELET # BLD AUTO: 234 K/UL (ref 150–400)
PLATELET # BLD AUTO: 256 K/UL (ref 150–400)
PLATELET # BLD AUTO: 283 K/UL (ref 150–400)
PLATELET # BLD AUTO: 303 K/UL (ref 150–400)
PLATELET # BLD AUTO: 360 K/UL (ref 150–400)
PLATELET # BLD AUTO: 51 K/UL (ref 150–400)
PLATELET # BLD AUTO: 6 K/UL (ref 150–400)
PLATELET # BLD AUTO: 7 K/UL (ref 150–400)
PLATELET # BLD AUTO: 9 K/UL (ref 150–400)
PO2 BLD: 68 MMHG (ref 80–100)
PO2 BLD: 69 MMHG (ref 80–100)
PO2 BLD: 70 MMHG (ref 80–100)
PO2 BLDV: 47 MMHG (ref 25–40)
POTASSIUM SERPL-SCNC: 2.7 MMOL/L (ref 3.5–5.1)
POTASSIUM SERPL-SCNC: 2.9 MMOL/L (ref 3.5–5.1)
POTASSIUM SERPL-SCNC: 3 MMOL/L (ref 3.5–5.1)
POTASSIUM SERPL-SCNC: 3.2 MMOL/L (ref 3.5–5.1)
POTASSIUM SERPL-SCNC: 3.4 MMOL/L (ref 3.5–5.1)
POTASSIUM SERPL-SCNC: 3.4 MMOL/L (ref 3.5–5.1)
POTASSIUM SERPL-SCNC: 3.5 MMOL/L (ref 3.5–5.1)
POTASSIUM SERPL-SCNC: 3.6 MMOL/L (ref 3.5–5.1)
POTASSIUM SERPL-SCNC: 3.7 MMOL/L (ref 3.5–5.1)
POTASSIUM SERPL-SCNC: 3.9 MMOL/L (ref 3.5–5.1)
POTASSIUM SERPL-SCNC: 3.9 MMOL/L (ref 3.5–5.1)
POTASSIUM SERPL-SCNC: 4.1 MMOL/L (ref 3.5–5.1)
POTASSIUM SERPL-SCNC: 4.1 MMOL/L (ref 3.5–5.1)
POTASSIUM SERPL-SCNC: 4.3 MMOL/L (ref 3.5–5.1)
POTASSIUM SERPL-SCNC: 4.4 MMOL/L (ref 3.5–5.1)
POTASSIUM SERPL-SCNC: 4.5 MMOL/L (ref 3.5–5.1)
POTASSIUM SERPL-SCNC: 4.8 MMOL/L (ref 3.5–5.1)
POTASSIUM SERPL-SCNC: 5.7 MMOL/L (ref 3.5–5.1)
POTASSIUM SERPL-SCNC: 5.8 MMOL/L (ref 3.5–5.1)
PROMYELOCYTES NFR BLD MANUAL: 0 %
PROT SERPL-MCNC: 5 G/DL (ref 6.4–8.2)
PROT SERPL-MCNC: 5.6 G/DL (ref 6.4–8.2)
PROT SERPL-MCNC: 6.1 G/DL (ref 6.4–8.2)
PROT SERPL-MCNC: 6.1 G/DL (ref 6.4–8.2)
PROT SERPL-MCNC: 6.3 G/DL (ref 6.4–8.2)
PROT SERPL-MCNC: 6.4 G/DL (ref 6.4–8.2)
PROT SERPL-MCNC: 6.7 G/DL (ref 6.4–8.2)
PROT SERPL-MCNC: 6.8 G/DL (ref 6.4–8.2)
PROT SERPL-MCNC: 7 G/DL (ref 6.4–8.2)
PROT SERPL-MCNC: 7.5 G/DL (ref 6.4–8.2)
PROT SERPL-MCNC: 7.7 G/DL (ref 6.4–8.2)
PROT SERPL-MCNC: 7.7 G/DL (ref 6.4–8.2)
PROT UR STRIP-MCNC: NEGATIVE MG/DL
PROT UR STRIP-MCNC: NEGATIVE MG/DL
PROTHROMBIN TIME: 11.8 SEC (ref 9–11.1)
PROTHROMBIN TIME: 13 SEC (ref 9–11.1)
PROTHROMBIN TIME: 13.1 SEC (ref 9–11.1)
PROTHROMBIN TIME: 15.6 SEC (ref 9–11.1)
PTH RELATED PROT SERPL-SCNC: <1.1 PMOL/L
PTH-INTACT SERPL-MCNC: <6.3 PG/ML (ref 14–72)
Q-T INTERVAL, ECG07: 296 MS
Q-T INTERVAL, ECG07: 356 MS
QRS DURATION, ECG06: 76 MS
QRS DURATION, ECG06: 80 MS
QTC CALCULATION (BEZET), ECG08: 445 MS
QTC CALCULATION (BEZET), ECG08: 447 MS
RBC # BLD AUTO: 2.35 M/UL (ref 3.8–5.2)
RBC # BLD AUTO: 2.38 M/UL (ref 3.8–5.2)
RBC # BLD AUTO: 2.42 M/UL (ref 3.8–5.2)
RBC # BLD AUTO: 2.56 M/UL (ref 3.8–5.2)
RBC # BLD AUTO: 2.57 M/UL (ref 3.8–5.2)
RBC # BLD AUTO: 2.73 M/UL (ref 3.8–5.2)
RBC # BLD AUTO: 2.81 M/UL (ref 3.8–5.2)
RBC # BLD AUTO: 2.84 M/UL (ref 3.8–5.2)
RBC # BLD AUTO: 2.9 M/UL (ref 3.8–5.2)
RBC # BLD AUTO: 2.97 M/UL (ref 3.8–5.2)
RBC # BLD AUTO: 2.97 M/UL (ref 3.8–5.2)
RBC # BLD AUTO: 2.98 M/UL (ref 3.8–5.2)
RBC # BLD AUTO: 3.02 M/UL (ref 3.8–5.2)
RBC # BLD AUTO: 3.27 M/UL (ref 3.8–5.2)
RBC # BLD AUTO: 3.27 M/UL (ref 3.8–5.2)
RBC # BLD AUTO: 3.35 M/UL (ref 3.8–5.2)
RBC # BLD AUTO: 3.36 M/UL (ref 3.8–5.2)
RBC # BLD AUTO: 3.46 M/UL (ref 3.8–5.2)
RBC #/AREA URNS HPF: ABNORMAL /HPF (ref 0–5)
RBC #/AREA URNS HPF: ABNORMAL /HPF (ref 0–5)
RBC MORPH BLD: ABNORMAL
RETICS/RBC NFR AUTO: 2.1 % (ref 0.7–2.1)
SAO2 % BLD: 89 % (ref 92–97)
SAO2 % BLD: 90 % (ref 92–97)
SAO2 % BLD: 92 % (ref 92–97)
SAO2 % BLDV: 80 % (ref 65–88)
SERVICE CMNT-IMP: ABNORMAL
SERVICE CMNT-IMP: ABNORMAL
SERVICE CMNT-IMP: NORMAL
SERVICE CMNT-IMP: NORMAL
SODIUM SERPL-SCNC: 130 MMOL/L (ref 136–145)
SODIUM SERPL-SCNC: 132 MMOL/L (ref 136–145)
SODIUM SERPL-SCNC: 132 MMOL/L (ref 136–145)
SODIUM SERPL-SCNC: 134 MMOL/L (ref 136–145)
SODIUM SERPL-SCNC: 135 MMOL/L (ref 136–145)
SODIUM SERPL-SCNC: 136 MMOL/L (ref 136–145)
SODIUM SERPL-SCNC: 136 MMOL/L (ref 136–145)
SODIUM SERPL-SCNC: 137 MMOL/L (ref 136–145)
SODIUM SERPL-SCNC: 137 MMOL/L (ref 136–145)
SODIUM SERPL-SCNC: 139 MMOL/L (ref 136–145)
SODIUM SERPL-SCNC: 140 MMOL/L (ref 136–145)
SODIUM SERPL-SCNC: 140 MMOL/L (ref 136–145)
SODIUM SERPL-SCNC: 141 MMOL/L (ref 136–145)
SODIUM SERPL-SCNC: 141 MMOL/L (ref 136–145)
SODIUM SERPL-SCNC: 143 MMOL/L (ref 136–145)
SODIUM SERPL-SCNC: 143 MMOL/L (ref 136–145)
SP GR UR REFRACTOMETRY: 1.01 (ref 1–1.03)
SP GR UR REFRACTOMETRY: 1.02 (ref 1–1.03)
SPECIMEN EXP DATE BLD: NORMAL
SPECIMEN TYPE: ABNORMAL
SRA 100IU/ML UFH SER-ACNC: 1 % (ref 0–20)
SRA UFH SER-IMP: NORMAL
STATUS OF UNIT,%ST: NORMAL
THERAPEUTIC RANGE,PTTT: ABNORMAL SECS (ref 58–77)
THERAPEUTIC RANGE,PTTT: NORMAL SECS (ref 58–77)
TROPONIN I SERPL-MCNC: 0.08 NG/ML
TROPONIN I SERPL-MCNC: 0.09 NG/ML
TROPONIN I SERPL-MCNC: 0.09 NG/ML
TSH SERPL DL<=0.05 MIU/L-ACNC: 0.59 UIU/ML (ref 0.36–3.74)
TSH SERPL DL<=0.05 MIU/L-ACNC: 3.14 UIU/ML (ref 0.36–3.74)
UA: UC IF INDICATED,UAUC: ABNORMAL
UNFRAC HEPARIN LOW DOSE: 2 % (ref 0–20)
UNIT DIVISION, %UDIV: 0
UROBILINOGEN UR QL STRIP.AUTO: 0.2 EU/DL (ref 0.2–1)
UROBILINOGEN UR QL STRIP.AUTO: 1 EU/DL (ref 0.2–1)
VENTRICULAR RATE, ECG03: 136 BPM
VENTRICULAR RATE, ECG03: 95 BPM
WBC # BLD AUTO: 10.1 K/UL (ref 3.6–11)
WBC # BLD AUTO: 11.2 K/UL (ref 3.6–11)
WBC # BLD AUTO: 12.2 K/UL (ref 3.6–11)
WBC # BLD AUTO: 15.2 K/UL (ref 3.6–11)
WBC # BLD AUTO: 16.3 K/UL (ref 3.6–11)
WBC # BLD AUTO: 16.4 K/UL (ref 3.6–11)
WBC # BLD AUTO: 16.7 K/UL (ref 3.6–11)
WBC # BLD AUTO: 16.9 K/UL (ref 3.6–11)
WBC # BLD AUTO: 17.9 K/UL (ref 3.6–11)
WBC # BLD AUTO: 19.6 K/UL (ref 3.6–11)
WBC # BLD AUTO: 21.3 K/UL (ref 3.6–11)
WBC # BLD AUTO: 21.7 K/UL (ref 3.6–11)
WBC # BLD AUTO: 5.8 K/UL (ref 3.6–11)
WBC # BLD AUTO: 6.1 K/UL (ref 3.6–11)
WBC # BLD AUTO: 7.6 K/UL (ref 3.6–11)
WBC # BLD AUTO: 8.1 K/UL (ref 3.6–11)
WBC # BLD AUTO: 8.2 K/UL (ref 3.6–11)
WBC # BLD AUTO: 8.7 K/UL (ref 3.6–11)
WBC MORPH BLD: ABNORMAL
WBC OTHER # BLD MANUAL: 0 10*3/UL
WBC URNS QL MICRO: ABNORMAL /HPF (ref 0–4)
WBC URNS QL MICRO: ABNORMAL /HPF (ref 0–4)

## 2017-01-01 PROCEDURE — 77010033678 HC OXYGEN DAILY

## 2017-01-01 PROCEDURE — 74011636320 HC RX REV CODE- 636/320

## 2017-01-01 PROCEDURE — 82040 ASSAY OF SERUM ALBUMIN: CPT | Performed by: FAMILY MEDICINE

## 2017-01-01 PROCEDURE — 83735 ASSAY OF MAGNESIUM: CPT | Performed by: FAMILY MEDICINE

## 2017-01-01 PROCEDURE — 80048 BASIC METABOLIC PNL TOTAL CA: CPT | Performed by: FAMILY MEDICINE

## 2017-01-01 PROCEDURE — 74011250637 HC RX REV CODE- 250/637: Performed by: INTERNAL MEDICINE

## 2017-01-01 PROCEDURE — 97532 HC OT COGNITIVE SKILL DEV 15 MIN: CPT

## 2017-01-01 PROCEDURE — 87040 BLOOD CULTURE FOR BACTERIA: CPT | Performed by: INTERNAL MEDICINE

## 2017-01-01 PROCEDURE — 36415 COLL VENOUS BLD VENIPUNCTURE: CPT | Performed by: INTERNAL MEDICINE

## 2017-01-01 PROCEDURE — 88331 PATH CONSLTJ SURG 1 BLK 1SPC: CPT | Performed by: ORTHOPAEDIC SURGERY

## 2017-01-01 PROCEDURE — 82803 BLOOD GASES ANY COMBINATION: CPT

## 2017-01-01 PROCEDURE — 77030006619 HC BLD FEM RETRO SYNT -F: Performed by: ORTHOPAEDIC SURGERY

## 2017-01-01 PROCEDURE — 85027 COMPLETE CBC AUTOMATED: CPT | Performed by: INTERNAL MEDICINE

## 2017-01-01 PROCEDURE — 36600 WITHDRAWAL OF ARTERIAL BLOOD: CPT

## 2017-01-01 PROCEDURE — G8988 SELF CARE GOAL STATUS: HCPCS

## 2017-01-01 PROCEDURE — 74011250636 HC RX REV CODE- 250/636: Performed by: INTERNAL MEDICINE

## 2017-01-01 PROCEDURE — 77030011640 HC PAD GRND REM COVD -A: Performed by: ORTHOPAEDIC SURGERY

## 2017-01-01 PROCEDURE — 85730 THROMBOPLASTIN TIME PARTIAL: CPT | Performed by: INTERNAL MEDICINE

## 2017-01-01 PROCEDURE — 77030002916 HC SUT ETHLN J&J -A: Performed by: ORTHOPAEDIC SURGERY

## 2017-01-01 PROCEDURE — 97116 GAIT TRAINING THERAPY: CPT

## 2017-01-01 PROCEDURE — 77014 CT GUIDED THERAPY PLAN/PLACEMENT: CPT

## 2017-01-01 PROCEDURE — 65270000029 HC RM PRIVATE

## 2017-01-01 PROCEDURE — 83970 ASSAY OF PARATHORMONE: CPT | Performed by: EMERGENCY MEDICINE

## 2017-01-01 PROCEDURE — 74011250637 HC RX REV CODE- 250/637: Performed by: FAMILY MEDICINE

## 2017-01-01 PROCEDURE — 77030013140 HC MSK NEB VYRM -A

## 2017-01-01 PROCEDURE — 85379 FIBRIN DEGRADATION QUANT: CPT | Performed by: INTERNAL MEDICINE

## 2017-01-01 PROCEDURE — 74011000250 HC RX REV CODE- 250: Performed by: NURSE PRACTITIONER

## 2017-01-01 PROCEDURE — 74011250636 HC RX REV CODE- 250/636: Performed by: NURSE PRACTITIONER

## 2017-01-01 PROCEDURE — 93970 EXTREMITY STUDY: CPT

## 2017-01-01 PROCEDURE — 74011250636 HC RX REV CODE- 250/636: Performed by: ANESTHESIOLOGY

## 2017-01-01 PROCEDURE — 82652 VIT D 1 25-DIHYDROXY: CPT | Performed by: INTERNAL MEDICINE

## 2017-01-01 PROCEDURE — 97530 THERAPEUTIC ACTIVITIES: CPT

## 2017-01-01 PROCEDURE — 99284 EMERGENCY DEPT VISIT MOD MDM: CPT

## 2017-01-01 PROCEDURE — 74011250636 HC RX REV CODE- 250/636: Performed by: EMERGENCY MEDICINE

## 2017-01-01 PROCEDURE — 77030020365 HC SOL INJ SOD CL 0.9% 50ML

## 2017-01-01 PROCEDURE — 88342 IMHCHEM/IMCYTCHM 1ST ANTB: CPT | Performed by: ORTHOPAEDIC SURGERY

## 2017-01-01 PROCEDURE — 80053 COMPREHEN METABOLIC PANEL: CPT | Performed by: INTERNAL MEDICINE

## 2017-01-01 PROCEDURE — 74011250637 HC RX REV CODE- 250/637: Performed by: NURSE PRACTITIONER

## 2017-01-01 PROCEDURE — 74011000250 HC RX REV CODE- 250

## 2017-01-01 PROCEDURE — 70553 MRI BRAIN STEM W/O & W/DYE: CPT

## 2017-01-01 PROCEDURE — 86900 BLOOD TYPING SEROLOGIC ABO: CPT | Performed by: INTERNAL MEDICINE

## 2017-01-01 PROCEDURE — 81001 URINALYSIS AUTO W/SCOPE: CPT | Performed by: EMERGENCY MEDICINE

## 2017-01-01 PROCEDURE — 84443 ASSAY THYROID STIM HORMONE: CPT | Performed by: INTERNAL MEDICINE

## 2017-01-01 PROCEDURE — 77307 TELETHX ISODOSE PLAN CPLX: CPT

## 2017-01-01 PROCEDURE — 36430 TRANSFUSION BLD/BLD COMPNT: CPT

## 2017-01-01 PROCEDURE — 84100 ASSAY OF PHOSPHORUS: CPT | Performed by: INTERNAL MEDICINE

## 2017-01-01 PROCEDURE — 93005 ELECTROCARDIOGRAM TRACING: CPT

## 2017-01-01 PROCEDURE — 88112 CYTOPATH CELL ENHANCE TECH: CPT | Performed by: FAMILY MEDICINE

## 2017-01-01 PROCEDURE — 74011250636 HC RX REV CODE- 250/636

## 2017-01-01 PROCEDURE — 84484 ASSAY OF TROPONIN QUANT: CPT | Performed by: INTERNAL MEDICINE

## 2017-01-01 PROCEDURE — 74011000250 HC RX REV CODE- 250: Performed by: HOSPITALIST

## 2017-01-01 PROCEDURE — 77030029684 HC NEB SM VOL KT MONA -A

## 2017-01-01 PROCEDURE — A9503 TC99M MEDRONATE: HCPCS

## 2017-01-01 PROCEDURE — 82542 COL CHROMOTOGRAPHY QUAL/QUAN: CPT | Performed by: INTERNAL MEDICINE

## 2017-01-01 PROCEDURE — 83010 ASSAY OF HAPTOGLOBIN QUANT: CPT | Performed by: INTERNAL MEDICINE

## 2017-01-01 PROCEDURE — 76001 XR FLUOROSCOPY OVER 60 MINUTES: CPT

## 2017-01-01 PROCEDURE — 77030014375 HC ENDCAP FEM NL SYNT -C: Performed by: ORTHOPAEDIC SURGERY

## 2017-01-01 PROCEDURE — 76060000034 HC ANESTHESIA 1.5 TO 2 HR: Performed by: ORTHOPAEDIC SURGERY

## 2017-01-01 PROCEDURE — 77030000031 HC BIT DRL QC SYNT -C: Performed by: ORTHOPAEDIC SURGERY

## 2017-01-01 PROCEDURE — 77030026438 HC STYL ET INTUB CARD -A: Performed by: ANESTHESIOLOGY

## 2017-01-01 PROCEDURE — 77067 SCR MAMMO BI INCL CAD: CPT

## 2017-01-01 PROCEDURE — 76210000000 HC OR PH I REC 2 TO 2.5 HR: Performed by: ORTHOPAEDIC SURGERY

## 2017-01-01 PROCEDURE — 80048 BASIC METABOLIC PNL TOTAL CA: CPT | Performed by: INTERNAL MEDICINE

## 2017-01-01 PROCEDURE — 76010000138 HC OR TIME 0.5 TO 1 HR: Performed by: ORTHOPAEDIC SURGERY

## 2017-01-01 PROCEDURE — 96374 THER/PROPH/DIAG INJ IV PUSH: CPT

## 2017-01-01 PROCEDURE — 30243N1 TRANSFUSION OF NONAUTOLOGOUS RED BLOOD CELLS INTO CENTRAL VEIN, PERCUTANEOUS APPROACH: ICD-10-PCS | Performed by: FAMILY MEDICINE

## 2017-01-01 PROCEDURE — 77030029131 HC ADMN ST IV BLD N DEHP ICUM -B

## 2017-01-01 PROCEDURE — 96361 HYDRATE IV INFUSION ADD-ON: CPT

## 2017-01-01 PROCEDURE — 71260 CT THORAX DX C+: CPT

## 2017-01-01 PROCEDURE — 77030031139 HC SUT VCRL2 J&J -A: Performed by: ORTHOPAEDIC SURGERY

## 2017-01-01 PROCEDURE — C1769 GUIDE WIRE: HCPCS | Performed by: ORTHOPAEDIC SURGERY

## 2017-01-01 PROCEDURE — 74011250637 HC RX REV CODE- 250/637: Performed by: PHYSICIAN ASSISTANT

## 2017-01-01 PROCEDURE — 74011250636 HC RX REV CODE- 250/636: Performed by: HOSPITALIST

## 2017-01-01 PROCEDURE — 77030008027

## 2017-01-01 PROCEDURE — 36415 COLL VENOUS BLD VENIPUNCTURE: CPT | Performed by: HOSPITALIST

## 2017-01-01 PROCEDURE — 76060000032 HC ANESTHESIA 0.5 TO 1 HR: Performed by: ORTHOPAEDIC SURGERY

## 2017-01-01 PROCEDURE — 96365 THER/PROPH/DIAG IV INF INIT: CPT

## 2017-01-01 PROCEDURE — 71010 XR CHEST PORT: CPT

## 2017-01-01 PROCEDURE — G8987 SELF CARE CURRENT STATUS: HCPCS

## 2017-01-01 PROCEDURE — 83615 LACTATE (LD) (LDH) ENZYME: CPT | Performed by: INTERNAL MEDICINE

## 2017-01-01 PROCEDURE — 97116 GAIT TRAINING THERAPY: CPT | Performed by: PHYSICAL THERAPIST

## 2017-01-01 PROCEDURE — 77030018719 HC DRSG PTCH ANTIMIC J&J -A

## 2017-01-01 PROCEDURE — 85025 COMPLETE CBC W/AUTO DIFF WBC: CPT | Performed by: INTERNAL MEDICINE

## 2017-01-01 PROCEDURE — 36415 COLL VENOUS BLD VENIPUNCTURE: CPT | Performed by: EMERGENCY MEDICINE

## 2017-01-01 PROCEDURE — 77030003497 HC NDL BIOP TISS BARD -B

## 2017-01-01 PROCEDURE — 73700 CT LOWER EXTREMITY W/O DYE: CPT

## 2017-01-01 PROCEDURE — 72131 CT LUMBAR SPINE W/O DYE: CPT

## 2017-01-01 PROCEDURE — 77300 RADIATION THERAPY DOSE PLAN: CPT

## 2017-01-01 PROCEDURE — 73701 CT LOWER EXTREMITY W/DYE: CPT

## 2017-01-01 PROCEDURE — 65660000000 HC RM CCU STEPDOWN

## 2017-01-01 PROCEDURE — 74177 CT ABD & PELVIS W/CONTRAST: CPT

## 2017-01-01 PROCEDURE — G8978 MOBILITY CURRENT STATUS: HCPCS

## 2017-01-01 PROCEDURE — 77010033711 HC HIGH FLOW OXYGEN

## 2017-01-01 PROCEDURE — 85730 THROMBOPLASTIN TIME PARTIAL: CPT | Performed by: EMERGENCY MEDICINE

## 2017-01-01 PROCEDURE — 77412 RADIATION TX DELIVERY LVL 3: CPT

## 2017-01-01 PROCEDURE — 99285 EMERGENCY DEPT VISIT HI MDM: CPT

## 2017-01-01 PROCEDURE — 82397 CHEMILUMINESCENT ASSAY: CPT | Performed by: INTERNAL MEDICINE

## 2017-01-01 PROCEDURE — 20611 DRAIN/INJ JOINT/BURSA W/US: CPT

## 2017-01-01 PROCEDURE — 74011000258 HC RX REV CODE- 258: Performed by: INTERNAL MEDICINE

## 2017-01-01 PROCEDURE — 83735 ASSAY OF MAGNESIUM: CPT | Performed by: INTERNAL MEDICINE

## 2017-01-01 PROCEDURE — 97535 SELF CARE MNGMENT TRAINING: CPT | Performed by: OCCUPATIONAL THERAPIST

## 2017-01-01 PROCEDURE — 85025 COMPLETE CBC W/AUTO DIFF WBC: CPT | Performed by: FAMILY MEDICINE

## 2017-01-01 PROCEDURE — 27323 BIOPSY THIGH SOFT TISSUES: CPT

## 2017-01-01 PROCEDURE — P9035 PLATELET PHERES LEUKOREDUCED: HCPCS | Performed by: INTERNAL MEDICINE

## 2017-01-01 PROCEDURE — 65610000006 HC RM INTENSIVE CARE

## 2017-01-01 PROCEDURE — 93971 EXTREMITY STUDY: CPT

## 2017-01-01 PROCEDURE — 88307 TISSUE EXAM BY PATHOLOGIST: CPT | Performed by: ORTHOPAEDIC SURGERY

## 2017-01-01 PROCEDURE — 97161 PT EVAL LOW COMPLEX 20 MIN: CPT

## 2017-01-01 PROCEDURE — 71030 XR CHEST COMP 4 V: CPT

## 2017-01-01 PROCEDURE — 77030020847 HC STATLOK BARD -A

## 2017-01-01 PROCEDURE — 73551 X-RAY EXAM OF FEMUR 1: CPT

## 2017-01-01 PROCEDURE — 97535 SELF CARE MNGMENT TRAINING: CPT

## 2017-01-01 PROCEDURE — 77030008467 HC STPLR SKN COVD -B: Performed by: ORTHOPAEDIC SURGERY

## 2017-01-01 PROCEDURE — 77334 RADIATION TREATMENT AID(S): CPT

## 2017-01-01 PROCEDURE — 74011250636 HC RX REV CODE- 250/636: Performed by: FAMILY MEDICINE

## 2017-01-01 PROCEDURE — 76942 ECHO GUIDE FOR BIOPSY: CPT

## 2017-01-01 PROCEDURE — 84100 ASSAY OF PHOSPHORUS: CPT | Performed by: FAMILY MEDICINE

## 2017-01-01 PROCEDURE — C1713 ANCHOR/SCREW BN/BN,TIS/BN: HCPCS | Performed by: ORTHOPAEDIC SURGERY

## 2017-01-01 PROCEDURE — G8979 MOBILITY GOAL STATUS: HCPCS

## 2017-01-01 PROCEDURE — L1830 KO IMMOB CANVAS LONG PRE OTS: HCPCS

## 2017-01-01 PROCEDURE — 82962 GLUCOSE BLOOD TEST: CPT

## 2017-01-01 PROCEDURE — 85610 PROTHROMBIN TIME: CPT | Performed by: INTERNAL MEDICINE

## 2017-01-01 PROCEDURE — 85384 FIBRINOGEN ACTIVITY: CPT | Performed by: INTERNAL MEDICINE

## 2017-01-01 PROCEDURE — 86923 COMPATIBILITY TEST ELECTRIC: CPT | Performed by: INTERNAL MEDICINE

## 2017-01-01 PROCEDURE — 74011636320 HC RX REV CODE- 636/320: Performed by: INTERNAL MEDICINE

## 2017-01-01 PROCEDURE — 97165 OT EVAL LOW COMPLEX 30 MIN: CPT

## 2017-01-01 PROCEDURE — 76010000153 HC OR TIME 1.5 TO 2 HR: Performed by: ORTHOPAEDIC SURGERY

## 2017-01-01 PROCEDURE — C1729 CATH, DRAINAGE: HCPCS

## 2017-01-01 PROCEDURE — 96376 TX/PRO/DX INJ SAME DRUG ADON: CPT

## 2017-01-01 PROCEDURE — 36415 COLL VENOUS BLD VENIPUNCTURE: CPT | Performed by: FAMILY MEDICINE

## 2017-01-01 PROCEDURE — 85025 COMPLETE CBC W/AUTO DIFF WBC: CPT | Performed by: EMERGENCY MEDICINE

## 2017-01-01 PROCEDURE — P9037 PLATE PHERES LEUKOREDU IRRAD: HCPCS | Performed by: INTERNAL MEDICINE

## 2017-01-01 PROCEDURE — 83605 ASSAY OF LACTIC ACID: CPT | Performed by: EMERGENCY MEDICINE

## 2017-01-01 PROCEDURE — 83735 ASSAY OF MAGNESIUM: CPT | Performed by: EMERGENCY MEDICINE

## 2017-01-01 PROCEDURE — A9585 GADOBUTROL INJECTION: HCPCS | Performed by: INTERNAL MEDICINE

## 2017-01-01 PROCEDURE — 73552 X-RAY EXAM OF FEMUR 2/>: CPT

## 2017-01-01 PROCEDURE — 77290 THER RAD SIMULAJ FIELD CPLX: CPT

## 2017-01-01 PROCEDURE — 77030013079 HC BLNKT BAIR HGGR 3M -A: Performed by: ANESTHESIOLOGY

## 2017-01-01 PROCEDURE — 74011000258 HC RX REV CODE- 258: Performed by: HOSPITALIST

## 2017-01-01 PROCEDURE — 77030008771 HC TU NG SALEM SUMP -A: Performed by: ANESTHESIOLOGY

## 2017-01-01 PROCEDURE — 88342 IMHCHEM/IMCYTCHM 1ST ANTB: CPT | Performed by: INTERNAL MEDICINE

## 2017-01-01 PROCEDURE — 83036 HEMOGLOBIN GLYCOSYLATED A1C: CPT | Performed by: INTERNAL MEDICINE

## 2017-01-01 PROCEDURE — 97110 THERAPEUTIC EXERCISES: CPT

## 2017-01-01 PROCEDURE — 86022 PLATELET ANTIBODIES: CPT | Performed by: INTERNAL MEDICINE

## 2017-01-01 PROCEDURE — 85025 COMPLETE CBC W/AUTO DIFF WBC: CPT | Performed by: HOSPITALIST

## 2017-01-01 PROCEDURE — 83880 ASSAY OF NATRIURETIC PEPTIDE: CPT | Performed by: INTERNAL MEDICINE

## 2017-01-01 PROCEDURE — 36569 INSJ PICC 5 YR+ W/O IMAGING: CPT | Performed by: INTERNAL MEDICINE

## 2017-01-01 PROCEDURE — 70450 CT HEAD/BRAIN W/O DYE: CPT

## 2017-01-01 PROCEDURE — 82306 VITAMIN D 25 HYDROXY: CPT | Performed by: INTERNAL MEDICINE

## 2017-01-01 PROCEDURE — 85045 AUTOMATED RETICULOCYTE COUNT: CPT | Performed by: INTERNAL MEDICINE

## 2017-01-01 PROCEDURE — 74011250636 HC RX REV CODE- 250/636: Performed by: ORTHOPAEDIC SURGERY

## 2017-01-01 PROCEDURE — 85610 PROTHROMBIN TIME: CPT

## 2017-01-01 PROCEDURE — 0S9C3ZX DRAINAGE OF RIGHT KNEE JOINT, PERCUTANEOUS APPROACH, DIAGNOSTIC: ICD-10-PCS | Performed by: RADIOLOGY

## 2017-01-01 PROCEDURE — 80053 COMPREHEN METABOLIC PANEL: CPT | Performed by: EMERGENCY MEDICINE

## 2017-01-01 PROCEDURE — 74011250636 HC RX REV CODE- 250/636: Performed by: PHYSICIAN ASSISTANT

## 2017-01-01 PROCEDURE — C1751 CATH, INF, PER/CENT/MIDLINE: HCPCS

## 2017-01-01 PROCEDURE — 97530 THERAPEUTIC ACTIVITIES: CPT | Performed by: PHYSICAL THERAPIST

## 2017-01-01 PROCEDURE — 0QBB3ZX EXCISION OF RIGHT LOWER FEMUR, PERCUTANEOUS APPROACH, DIAGNOSTIC: ICD-10-PCS | Performed by: ORTHOPAEDIC SURGERY

## 2017-01-01 PROCEDURE — 02HV33Z INSERTION OF INFUSION DEVICE INTO SUPERIOR VENA CAVA, PERCUTANEOUS APPROACH: ICD-10-PCS | Performed by: FAMILY MEDICINE

## 2017-01-01 PROCEDURE — 86900 BLOOD TYPING SEROLOGIC ABO: CPT | Performed by: ANESTHESIOLOGY

## 2017-01-01 PROCEDURE — 85610 PROTHROMBIN TIME: CPT | Performed by: EMERGENCY MEDICINE

## 2017-01-01 PROCEDURE — 77030008684 HC TU ET CUF COVD -B: Performed by: ANESTHESIOLOGY

## 2017-01-01 PROCEDURE — 93306 TTE W/DOPPLER COMPLETE: CPT

## 2017-01-01 PROCEDURE — 97162 PT EVAL MOD COMPLEX 30 MIN: CPT

## 2017-01-01 PROCEDURE — 76000 FLUOROSCOPY <1 HR PHYS/QHP: CPT

## 2017-01-01 PROCEDURE — 76937 US GUIDE VASCULAR ACCESS: CPT

## 2017-01-01 PROCEDURE — 87205 SMEAR GRAM STAIN: CPT | Performed by: INTERNAL MEDICINE

## 2017-01-01 PROCEDURE — 77030019908 HC STETH ESOPH SIMS -A: Performed by: ANESTHESIOLOGY

## 2017-01-01 PROCEDURE — 96375 TX/PRO/DX INJ NEW DRUG ADDON: CPT

## 2017-01-01 PROCEDURE — 81001 URINALYSIS AUTO W/SCOPE: CPT | Performed by: INTERNAL MEDICINE

## 2017-01-01 PROCEDURE — 77030020782 HC GWN BAIR PAWS FLX 3M -B

## 2017-01-01 PROCEDURE — 94640 AIRWAY INHALATION TREATMENT: CPT

## 2017-01-01 PROCEDURE — 77030003666 HC NDL SPINAL BD -A

## 2017-01-01 PROCEDURE — 77030020061 HC IV BLD WRMR ADMIN SET 3M -B: Performed by: ANESTHESIOLOGY

## 2017-01-01 PROCEDURE — P9016 RBC LEUKOCYTES REDUCED: HCPCS | Performed by: INTERNAL MEDICINE

## 2017-01-01 PROCEDURE — 77030020143 HC AIRWY LARYN INTUB CGAS -A: Performed by: NURSE ANESTHETIST, CERTIFIED REGISTERED

## 2017-01-01 PROCEDURE — 77030032490 HC SLV COMPR SCD KNE COVD -B

## 2017-01-01 PROCEDURE — 0QHB36Z INSERTION OF INTRAMEDULLARY INTERNAL FIXATION DEVICE INTO RIGHT LOWER FEMUR, PERCUTANEOUS APPROACH: ICD-10-PCS | Performed by: ORTHOPAEDIC SURGERY

## 2017-01-01 PROCEDURE — 77030014405 HC GD ROD RMR SYNT -C: Performed by: ORTHOPAEDIC SURGERY

## 2017-01-01 PROCEDURE — 77074 RADEX OSSEOUS SURVEY LMTD: CPT

## 2017-01-01 PROCEDURE — 76450000000

## 2017-01-01 PROCEDURE — 88305 TISSUE EXAM BY PATHOLOGIST: CPT | Performed by: INTERNAL MEDICINE

## 2017-01-01 PROCEDURE — 82140 ASSAY OF AMMONIA: CPT | Performed by: FAMILY MEDICINE

## 2017-01-01 PROCEDURE — 76210000006 HC OR PH I REC 0.5 TO 1 HR: Performed by: ORTHOPAEDIC SURGERY

## 2017-01-01 PROCEDURE — 84443 ASSAY THYROID STIM HORMONE: CPT | Performed by: FAMILY MEDICINE

## 2017-01-01 PROCEDURE — 77030016474 HC BIT DRL QC3 SYNT -C: Performed by: ORTHOPAEDIC SURGERY

## 2017-01-01 PROCEDURE — 77030018836 HC SOL IRR NACL ICUM -A: Performed by: ORTHOPAEDIC SURGERY

## 2017-01-01 PROCEDURE — 30233R0 TRANSFUSION OF AUTOLOGOUS PLATELETS INTO PERIPHERAL VEIN, PERCUTANEOUS APPROACH: ICD-10-PCS | Performed by: FAMILY MEDICINE

## 2017-01-01 DEVICE — SCREW BNE L50MM DIA5MM NONSTERILE TIB LT GRN TI ST CANN LOK: Type: IMPLANTABLE DEVICE | Site: FEMUR | Status: FUNCTIONAL

## 2017-01-01 DEVICE — IMPLANTABLE DEVICE: Type: IMPLANTABLE DEVICE | Site: FEMUR | Status: FUNCTIONAL

## 2017-01-01 DEVICE — ENDCAP ORTH EXTN 0MM FEM G TI CANN T40 STARDRV RECESS FOR: Type: IMPLANTABLE DEVICE | Site: FEMUR | Status: FUNCTIONAL

## 2017-01-01 DEVICE — SCREW BNE L66MM DIA5MM TIB LT GRN TI ST CANN LOK FULL THRD: Type: IMPLANTABLE DEVICE | Site: FEMUR | Status: FUNCTIONAL

## 2017-01-01 DEVICE — SCREW BNE L34MM DIA5MM NONSTERILE TIB LT GRN TI ST CANN LOK: Type: IMPLANTABLE DEVICE | Site: FEMUR | Status: FUNCTIONAL

## 2017-01-01 RX ORDER — CETIRIZINE HCL 10 MG
10 TABLET ORAL DAILY
COMMUNITY

## 2017-01-01 RX ORDER — FENTANYL CITRATE 50 UG/ML
50 INJECTION, SOLUTION INTRAMUSCULAR; INTRAVENOUS AS NEEDED
Status: DISCONTINUED | OUTPATIENT
Start: 2017-01-01 | End: 2017-01-01 | Stop reason: HOSPADM

## 2017-01-01 RX ORDER — MIDAZOLAM HYDROCHLORIDE 1 MG/ML
0.5 INJECTION, SOLUTION INTRAMUSCULAR; INTRAVENOUS
Status: DISCONTINUED | OUTPATIENT
Start: 2017-01-01 | End: 2017-01-01 | Stop reason: HOSPADM

## 2017-01-01 RX ORDER — SODIUM CHLORIDE 0.9 % (FLUSH) 0.9 %
5-10 SYRINGE (ML) INJECTION EVERY 8 HOURS
Status: DISCONTINUED | OUTPATIENT
Start: 2017-01-01 | End: 2017-01-01 | Stop reason: HOSPADM

## 2017-01-01 RX ORDER — FACIAL-BODY WIPES
10 EACH TOPICAL DAILY
Qty: 1 EACH | Refills: 0 | Status: ON HOLD
Start: 2017-01-01 | End: 2017-01-01 | Stop reason: DRUGHIGH

## 2017-01-01 RX ORDER — CYCLOBENZAPRINE HCL 10 MG
10 TABLET ORAL
Status: DISCONTINUED | OUTPATIENT
Start: 2017-01-01 | End: 2017-01-01 | Stop reason: HOSPADM

## 2017-01-01 RX ORDER — BUPROPION HYDROCHLORIDE 150 MG/1
300 TABLET ORAL
Status: DISCONTINUED | OUTPATIENT
Start: 2017-01-01 | End: 2017-01-01 | Stop reason: HOSPADM

## 2017-01-01 RX ORDER — BUPROPION HYDROCHLORIDE 300 MG/1
300 TABLET ORAL
Status: DISCONTINUED | OUTPATIENT
Start: 2017-01-01 | End: 2017-01-01 | Stop reason: CLARIF

## 2017-01-01 RX ORDER — ONDANSETRON 2 MG/ML
4 INJECTION INTRAMUSCULAR; INTRAVENOUS AS NEEDED
Status: DISCONTINUED | OUTPATIENT
Start: 2017-01-01 | End: 2017-01-01 | Stop reason: HOSPADM

## 2017-01-01 RX ORDER — SODIUM CHLORIDE 9 MG/ML
100 INJECTION, SOLUTION INTRAVENOUS CONTINUOUS
Status: DISCONTINUED | OUTPATIENT
Start: 2017-01-01 | End: 2017-01-01

## 2017-01-01 RX ORDER — POTASSIUM CHLORIDE 750 MG/1
10 TABLET, FILM COATED, EXTENDED RELEASE ORAL 3 TIMES DAILY
Status: DISCONTINUED | OUTPATIENT
Start: 2017-01-01 | End: 2017-01-01 | Stop reason: HOSPADM

## 2017-01-01 RX ORDER — SUCCINYLCHOLINE CHLORIDE 20 MG/ML
INJECTION INTRAMUSCULAR; INTRAVENOUS AS NEEDED
Status: DISCONTINUED | OUTPATIENT
Start: 2017-01-01 | End: 2017-01-01 | Stop reason: HOSPADM

## 2017-01-01 RX ORDER — CYCLOBENZAPRINE HCL 10 MG
5 TABLET ORAL
Status: DISCONTINUED | OUTPATIENT
Start: 2017-01-01 | End: 2017-01-01

## 2017-01-01 RX ORDER — PANTOPRAZOLE SODIUM 40 MG/1
40 TABLET, DELAYED RELEASE ORAL
Status: DISCONTINUED | OUTPATIENT
Start: 2017-01-01 | End: 2017-01-01 | Stop reason: HOSPADM

## 2017-01-01 RX ORDER — FENTANYL CITRATE 50 UG/ML
INJECTION, SOLUTION INTRAMUSCULAR; INTRAVENOUS AS NEEDED
Status: DISCONTINUED | OUTPATIENT
Start: 2017-01-01 | End: 2017-01-01 | Stop reason: HOSPADM

## 2017-01-01 RX ORDER — FAMOTIDINE 20 MG/1
20 TABLET, FILM COATED ORAL 2 TIMES DAILY
Qty: 30 TAB | Refills: 0 | Status: SHIPPED
Start: 2017-01-01

## 2017-01-01 RX ORDER — ENOXAPARIN SODIUM 100 MG/ML
1 INJECTION SUBCUTANEOUS ONCE
Status: COMPLETED | OUTPATIENT
Start: 2017-01-01 | End: 2017-01-01

## 2017-01-01 RX ORDER — METOPROLOL TARTRATE 5 MG/5ML
2.5 INJECTION INTRAVENOUS
Status: DISCONTINUED | OUTPATIENT
Start: 2017-01-01 | End: 2017-01-01 | Stop reason: HOSPADM

## 2017-01-01 RX ORDER — BACLOFEN 10 MG/1
5 TABLET ORAL 3 TIMES DAILY
Status: DISCONTINUED | OUTPATIENT
Start: 2017-01-01 | End: 2017-01-01 | Stop reason: HOSPADM

## 2017-01-01 RX ORDER — SODIUM CHLORIDE 0.9 % (FLUSH) 0.9 %
10 SYRINGE (ML) INJECTION
Status: DISPENSED | OUTPATIENT
Start: 2017-01-01 | End: 2017-01-01

## 2017-01-01 RX ORDER — FENTANYL CITRATE 50 UG/ML
50 INJECTION, SOLUTION INTRAMUSCULAR; INTRAVENOUS
Status: DISCONTINUED | OUTPATIENT
Start: 2017-01-01 | End: 2017-01-01

## 2017-01-01 RX ORDER — MAGNESIUM SULFATE HEPTAHYDRATE 40 MG/ML
2 INJECTION, SOLUTION INTRAVENOUS ONCE
Status: COMPLETED | OUTPATIENT
Start: 2017-01-01 | End: 2017-01-01

## 2017-01-01 RX ORDER — MIDAZOLAM HYDROCHLORIDE 1 MG/ML
INJECTION, SOLUTION INTRAMUSCULAR; INTRAVENOUS AS NEEDED
Status: DISCONTINUED | OUTPATIENT
Start: 2017-01-01 | End: 2017-01-01 | Stop reason: HOSPADM

## 2017-01-01 RX ORDER — LEVOFLOXACIN 5 MG/ML
500 INJECTION, SOLUTION INTRAVENOUS EVERY 24 HOURS
Status: DISCONTINUED | OUTPATIENT
Start: 2017-01-01 | End: 2017-01-01 | Stop reason: DRUGHIGH

## 2017-01-01 RX ORDER — LIDOCAINE HYDROCHLORIDE 20 MG/ML
INJECTION, SOLUTION EPIDURAL; INFILTRATION; INTRACAUDAL; PERINEURAL AS NEEDED
Status: DISCONTINUED | OUTPATIENT
Start: 2017-01-01 | End: 2017-01-01 | Stop reason: HOSPADM

## 2017-01-01 RX ORDER — MORPHINE SULFATE 10 MG/ML
2 INJECTION, SOLUTION INTRAMUSCULAR; INTRAVENOUS
Status: DISCONTINUED | OUTPATIENT
Start: 2017-01-01 | End: 2017-01-01 | Stop reason: HOSPADM

## 2017-01-01 RX ORDER — ACETAMINOPHEN 10 MG/ML
1000 INJECTION, SOLUTION INTRAVENOUS ONCE
Status: COMPLETED | OUTPATIENT
Start: 2017-01-01 | End: 2017-01-01

## 2017-01-01 RX ORDER — SODIUM CHLORIDE 0.9 % (FLUSH) 0.9 %
5-10 SYRINGE (ML) INJECTION AS NEEDED
Status: DISCONTINUED | OUTPATIENT
Start: 2017-01-01 | End: 2017-01-01 | Stop reason: HOSPADM

## 2017-01-01 RX ORDER — DIPHENHYDRAMINE HYDROCHLORIDE 50 MG/ML
12.5 INJECTION, SOLUTION INTRAMUSCULAR; INTRAVENOUS AS NEEDED
Status: DISCONTINUED | OUTPATIENT
Start: 2017-01-01 | End: 2017-01-01 | Stop reason: HOSPADM

## 2017-01-01 RX ORDER — HYDROMORPHONE HYDROCHLORIDE 1 MG/ML
1 INJECTION, SOLUTION INTRAMUSCULAR; INTRAVENOUS; SUBCUTANEOUS
Status: DISCONTINUED | OUTPATIENT
Start: 2017-01-01 | End: 2017-01-01

## 2017-01-01 RX ORDER — SENNOSIDES 8.6 MG/1
2 TABLET ORAL DAILY
Status: DISCONTINUED | OUTPATIENT
Start: 2017-01-01 | End: 2017-01-01 | Stop reason: HOSPADM

## 2017-01-01 RX ORDER — ONDANSETRON 2 MG/ML
INJECTION INTRAMUSCULAR; INTRAVENOUS AS NEEDED
Status: DISCONTINUED | OUTPATIENT
Start: 2017-01-01 | End: 2017-01-01 | Stop reason: HOSPADM

## 2017-01-01 RX ORDER — SODIUM CHLORIDE 0.9 % (FLUSH) 0.9 %
10 SYRINGE (ML) INJECTION
Status: COMPLETED | OUTPATIENT
Start: 2017-01-01 | End: 2017-01-01

## 2017-01-01 RX ORDER — FENTANYL CITRATE 50 UG/ML
100 INJECTION, SOLUTION INTRAMUSCULAR; INTRAVENOUS ONCE
Status: COMPLETED | OUTPATIENT
Start: 2017-01-01 | End: 2017-01-01

## 2017-01-01 RX ORDER — HYDROMORPHONE HYDROCHLORIDE 1 MG/ML
2 INJECTION, SOLUTION INTRAMUSCULAR; INTRAVENOUS; SUBCUTANEOUS ONCE
Status: ACTIVE | OUTPATIENT
Start: 2017-01-01 | End: 2017-01-01

## 2017-01-01 RX ORDER — HYDROMORPHONE HYDROCHLORIDE 2 MG/ML
2 INJECTION, SOLUTION INTRAMUSCULAR; INTRAVENOUS; SUBCUTANEOUS ONCE
Status: DISCONTINUED | OUTPATIENT
Start: 2017-01-01 | End: 2017-01-01

## 2017-01-01 RX ORDER — POLYETHYLENE GLYCOL 3350 17 G/17G
17 POWDER, FOR SOLUTION ORAL EVERY 12 HOURS
Status: DISCONTINUED | OUTPATIENT
Start: 2017-01-01 | End: 2017-01-01 | Stop reason: HOSPADM

## 2017-01-01 RX ORDER — ROCURONIUM BROMIDE 10 MG/ML
INJECTION, SOLUTION INTRAVENOUS AS NEEDED
Status: DISCONTINUED | OUTPATIENT
Start: 2017-01-01 | End: 2017-01-01 | Stop reason: HOSPADM

## 2017-01-01 RX ORDER — HYDROMORPHONE HYDROCHLORIDE 1 MG/ML
1 INJECTION, SOLUTION INTRAMUSCULAR; INTRAVENOUS; SUBCUTANEOUS
Status: COMPLETED | OUTPATIENT
Start: 2017-01-01 | End: 2017-01-01

## 2017-01-01 RX ORDER — HYDROMORPHONE HCL IN 0.9% NACL 15 MG/30ML
PATIENT CONTROLLED ANALGESIA VIAL INTRAVENOUS CONTINUOUS
Status: DISCONTINUED | OUTPATIENT
Start: 2017-01-01 | End: 2017-01-01

## 2017-01-01 RX ORDER — FUROSEMIDE 10 MG/ML
INJECTION INTRAMUSCULAR; INTRAVENOUS
Status: DISPENSED
Start: 2017-01-01 | End: 2017-01-01

## 2017-01-01 RX ORDER — LABETALOL HYDROCHLORIDE 5 MG/ML
20 INJECTION, SOLUTION INTRAVENOUS
Status: DISCONTINUED | OUTPATIENT
Start: 2017-01-01 | End: 2017-01-01 | Stop reason: HOSPADM

## 2017-01-01 RX ORDER — POTASSIUM CHLORIDE 750 MG/1
40 TABLET, FILM COATED, EXTENDED RELEASE ORAL
Status: COMPLETED | OUTPATIENT
Start: 2017-01-01 | End: 2017-01-01

## 2017-01-01 RX ORDER — DIPHENHYDRAMINE HCL 25 MG
25 CAPSULE ORAL EVERY 6 HOURS
Status: DISCONTINUED | OUTPATIENT
Start: 2017-01-01 | End: 2017-01-01 | Stop reason: HOSPADM

## 2017-01-01 RX ORDER — FUROSEMIDE 10 MG/ML
20 INJECTION INTRAMUSCULAR; INTRAVENOUS ONCE
Status: COMPLETED | OUTPATIENT
Start: 2017-01-01 | End: 2017-01-01

## 2017-01-01 RX ORDER — PRAVASTATIN SODIUM 40 MG/1
40 TABLET ORAL
Status: DISCONTINUED | OUTPATIENT
Start: 2017-01-01 | End: 2017-01-01 | Stop reason: HOSPADM

## 2017-01-01 RX ORDER — CYCLOBENZAPRINE HCL 10 MG
10 TABLET ORAL
Qty: 10 TAB | Refills: 0 | Status: ON HOLD | OUTPATIENT
Start: 2017-01-01 | End: 2017-01-01

## 2017-01-01 RX ORDER — SODIUM CHLORIDE 9 MG/ML
250 INJECTION, SOLUTION INTRAVENOUS AS NEEDED
Status: DISCONTINUED | OUTPATIENT
Start: 2017-01-01 | End: 2017-01-01 | Stop reason: HOSPADM

## 2017-01-01 RX ORDER — HYDROMORPHONE HYDROCHLORIDE 1 MG/ML
1 INJECTION, SOLUTION INTRAMUSCULAR; INTRAVENOUS; SUBCUTANEOUS ONCE
Status: COMPLETED | OUTPATIENT
Start: 2017-01-01 | End: 2017-01-01

## 2017-01-01 RX ORDER — HYDROMORPHONE HCL IN 0.9% NACL 15 MG/30ML
PATIENT CONTROLLED ANALGESIA VIAL INTRAVENOUS CONTINUOUS
Status: DISCONTINUED | OUTPATIENT
Start: 2017-01-01 | End: 2017-01-01 | Stop reason: HOSPADM

## 2017-01-01 RX ORDER — SODIUM CHLORIDE, SODIUM LACTATE, POTASSIUM CHLORIDE, CALCIUM CHLORIDE 600; 310; 30; 20 MG/100ML; MG/100ML; MG/100ML; MG/100ML
125 INJECTION, SOLUTION INTRAVENOUS CONTINUOUS
Status: DISCONTINUED | OUTPATIENT
Start: 2017-01-01 | End: 2017-01-01 | Stop reason: HOSPADM

## 2017-01-01 RX ORDER — NALOXONE HYDROCHLORIDE 0.4 MG/ML
0.5 INJECTION, SOLUTION INTRAMUSCULAR; INTRAVENOUS; SUBCUTANEOUS AS NEEDED
Status: DISCONTINUED | OUTPATIENT
Start: 2017-01-01 | End: 2017-01-01 | Stop reason: HOSPADM

## 2017-01-01 RX ORDER — POLYETHYLENE GLYCOL 3350 17 G/17G
17 POWDER, FOR SOLUTION ORAL DAILY
Qty: 1 EACH | Refills: 0 | Status: SHIPPED
Start: 2017-01-01

## 2017-01-01 RX ORDER — IPRATROPIUM BROMIDE AND ALBUTEROL SULFATE 2.5; .5 MG/3ML; MG/3ML
3 SOLUTION RESPIRATORY (INHALATION)
Status: DISCONTINUED | OUTPATIENT
Start: 2017-01-01 | End: 2017-01-01 | Stop reason: HOSPADM

## 2017-01-01 RX ORDER — FENTANYL CITRATE 50 UG/ML
25 INJECTION, SOLUTION INTRAMUSCULAR; INTRAVENOUS
Status: DISCONTINUED | OUTPATIENT
Start: 2017-01-01 | End: 2017-01-01 | Stop reason: HOSPADM

## 2017-01-01 RX ORDER — MORPHINE SULFATE 2 MG/ML
2 INJECTION, SOLUTION INTRAMUSCULAR; INTRAVENOUS
Status: DISCONTINUED | OUTPATIENT
Start: 2017-01-01 | End: 2017-01-01 | Stop reason: HOSPADM

## 2017-01-01 RX ORDER — HYDROMORPHONE HYDROCHLORIDE 1 MG/ML
1 INJECTION, SOLUTION INTRAMUSCULAR; INTRAVENOUS; SUBCUTANEOUS
Status: DISCONTINUED | OUTPATIENT
Start: 2017-01-01 | End: 2017-01-01 | Stop reason: SDUPTHER

## 2017-01-01 RX ORDER — PREDNISONE 10 MG/1
TABLET ORAL
Qty: 21 TAB | Refills: 0 | Status: SHIPPED | OUTPATIENT
Start: 2017-01-01 | End: 2017-01-01

## 2017-01-01 RX ORDER — HYDROCODONE BITARTRATE AND ACETAMINOPHEN 10; 325 MG/1; MG/1
1 TABLET ORAL
Status: DISCONTINUED | OUTPATIENT
Start: 2017-01-01 | End: 2017-01-01

## 2017-01-01 RX ORDER — ONDANSETRON 2 MG/ML
4 INJECTION INTRAMUSCULAR; INTRAVENOUS
Status: DISCONTINUED | OUTPATIENT
Start: 2017-01-01 | End: 2017-01-01 | Stop reason: SDUPTHER

## 2017-01-01 RX ORDER — OXYBUTYNIN CHLORIDE 5 MG/1
5 TABLET, EXTENDED RELEASE ORAL DAILY
Status: DISCONTINUED | OUTPATIENT
Start: 2017-01-01 | End: 2017-01-01 | Stop reason: HOSPADM

## 2017-01-01 RX ORDER — ENOXAPARIN SODIUM 100 MG/ML
80 INJECTION SUBCUTANEOUS EVERY 12 HOURS
Status: DISCONTINUED | OUTPATIENT
Start: 2017-01-01 | End: 2017-01-01

## 2017-01-01 RX ORDER — FACIAL-BODY WIPES
10 EACH TOPICAL
COMMUNITY

## 2017-01-01 RX ORDER — PROPOFOL 10 MG/ML
INJECTION, EMULSION INTRAVENOUS AS NEEDED
Status: DISCONTINUED | OUTPATIENT
Start: 2017-01-01 | End: 2017-01-01 | Stop reason: HOSPADM

## 2017-01-01 RX ORDER — ZOLEDRONIC ACID 0.04 MG/ML
4 INJECTION, SOLUTION INTRAVENOUS ONCE
Status: COMPLETED | OUTPATIENT
Start: 2017-01-01 | End: 2017-01-01

## 2017-01-01 RX ORDER — OXYCODONE AND ACETAMINOPHEN 10; 325 MG/1; MG/1
1 TABLET ORAL
Status: DISCONTINUED | OUTPATIENT
Start: 2017-01-01 | End: 2017-01-01 | Stop reason: HOSPADM

## 2017-01-01 RX ORDER — SODIUM CHLORIDE 0.9 % (FLUSH) 0.9 %
10 SYRINGE (ML) INJECTION AS NEEDED
Status: DISCONTINUED | OUTPATIENT
Start: 2017-01-01 | End: 2017-01-01 | Stop reason: HOSPADM

## 2017-01-01 RX ORDER — DIAZEPAM 10 MG/2ML
INJECTION INTRAMUSCULAR
Status: COMPLETED
Start: 2017-01-01 | End: 2017-01-01

## 2017-01-01 RX ORDER — BACITRACIN 500 UNIT/G
1 PACKET (EA) TOPICAL AS NEEDED
Status: DISCONTINUED | OUTPATIENT
Start: 2017-01-01 | End: 2017-01-01 | Stop reason: HOSPADM

## 2017-01-01 RX ORDER — POLYETHYLENE GLYCOL 3350 17 G/17G
17 POWDER, FOR SOLUTION ORAL DAILY
Status: DISCONTINUED | OUTPATIENT
Start: 2017-01-01 | End: 2017-01-01 | Stop reason: HOSPADM

## 2017-01-01 RX ORDER — KETOROLAC TROMETHAMINE 30 MG/ML
30 INJECTION, SOLUTION INTRAMUSCULAR; INTRAVENOUS
Status: COMPLETED | OUTPATIENT
Start: 2017-01-01 | End: 2017-01-01

## 2017-01-01 RX ORDER — KETAMINE HYDROCHLORIDE 10 MG/ML
INJECTION, SOLUTION INTRAMUSCULAR; INTRAVENOUS AS NEEDED
Status: DISCONTINUED | OUTPATIENT
Start: 2017-01-01 | End: 2017-01-01 | Stop reason: HOSPADM

## 2017-01-01 RX ORDER — MAGNESIUM SULFATE 1 G/100ML
1 INJECTION INTRAVENOUS ONCE
Status: COMPLETED | OUTPATIENT
Start: 2017-01-01 | End: 2017-01-01

## 2017-01-01 RX ORDER — VANCOMYCIN 1.75 GRAM/500 ML IN 0.9 % SODIUM CHLORIDE INTRAVENOUS
1750 ONCE
Status: COMPLETED | OUTPATIENT
Start: 2017-01-01 | End: 2017-01-01

## 2017-01-01 RX ORDER — DEXAMETHASONE SODIUM PHOSPHATE 4 MG/ML
INJECTION, SOLUTION INTRA-ARTICULAR; INTRALESIONAL; INTRAMUSCULAR; INTRAVENOUS; SOFT TISSUE AS NEEDED
Status: DISCONTINUED | OUTPATIENT
Start: 2017-01-01 | End: 2017-01-01 | Stop reason: HOSPADM

## 2017-01-01 RX ORDER — NALOXONE HYDROCHLORIDE 0.4 MG/ML
0.4 INJECTION, SOLUTION INTRAMUSCULAR; INTRAVENOUS; SUBCUTANEOUS AS NEEDED
Status: DISCONTINUED | OUTPATIENT
Start: 2017-01-01 | End: 2017-01-01 | Stop reason: HOSPADM

## 2017-01-01 RX ORDER — KETOROLAC TROMETHAMINE 30 MG/ML
INJECTION, SOLUTION INTRAMUSCULAR; INTRAVENOUS
Status: COMPLETED
Start: 2017-01-01 | End: 2017-01-01

## 2017-01-01 RX ORDER — HYDROMORPHONE HYDROCHLORIDE 1 MG/ML
0.5 INJECTION, SOLUTION INTRAMUSCULAR; INTRAVENOUS; SUBCUTANEOUS
Status: DISCONTINUED | OUTPATIENT
Start: 2017-01-01 | End: 2017-01-01

## 2017-01-01 RX ORDER — CALCIUM CARBONATE 200(500)MG
200 TABLET,CHEWABLE ORAL
Status: DISCONTINUED | OUTPATIENT
Start: 2017-01-01 | End: 2017-01-01 | Stop reason: HOSPADM

## 2017-01-01 RX ORDER — DIAZEPAM 10 MG/2ML
5 INJECTION INTRAMUSCULAR ONCE
Status: COMPLETED | OUTPATIENT
Start: 2017-01-01 | End: 2017-01-01

## 2017-01-01 RX ORDER — SODIUM CHLORIDE, SODIUM LACTATE, POTASSIUM CHLORIDE, CALCIUM CHLORIDE 600; 310; 30; 20 MG/100ML; MG/100ML; MG/100ML; MG/100ML
25 INJECTION, SOLUTION INTRAVENOUS CONTINUOUS
Status: DISCONTINUED | OUTPATIENT
Start: 2017-01-01 | End: 2017-01-01 | Stop reason: HOSPADM

## 2017-01-01 RX ORDER — ONDANSETRON 2 MG/ML
4 INJECTION INTRAMUSCULAR; INTRAVENOUS
Status: DISCONTINUED | OUTPATIENT
Start: 2017-01-01 | End: 2017-01-01 | Stop reason: HOSPADM

## 2017-01-01 RX ORDER — FUROSEMIDE 10 MG/ML
40 INJECTION INTRAMUSCULAR; INTRAVENOUS ONCE
Status: COMPLETED | OUTPATIENT
Start: 2017-01-01 | End: 2017-01-01

## 2017-01-01 RX ORDER — LORAZEPAM 1 MG/1
1 TABLET ORAL
Status: DISCONTINUED | OUTPATIENT
Start: 2017-01-01 | End: 2017-01-01 | Stop reason: HOSPADM

## 2017-01-01 RX ORDER — ADHESIVE BANDAGE
30 BANDAGE TOPICAL DAILY PRN
Status: DISCONTINUED | OUTPATIENT
Start: 2017-01-01 | End: 2017-01-01 | Stop reason: HOSPADM

## 2017-01-01 RX ORDER — HYDROMORPHONE HYDROCHLORIDE 1 MG/ML
0.2 INJECTION, SOLUTION INTRAMUSCULAR; INTRAVENOUS; SUBCUTANEOUS
Status: DISCONTINUED | OUTPATIENT
Start: 2017-01-01 | End: 2017-01-01 | Stop reason: HOSPADM

## 2017-01-01 RX ORDER — SIMVASTATIN 40 MG/1
40 TABLET, FILM COATED ORAL
Status: DISCONTINUED | OUTPATIENT
Start: 2017-01-01 | End: 2017-01-01

## 2017-01-01 RX ORDER — POTASSIUM CHLORIDE 14.9 MG/ML
10 INJECTION INTRAVENOUS
Status: COMPLETED | OUTPATIENT
Start: 2017-01-01 | End: 2017-01-01

## 2017-01-01 RX ORDER — POTASSIUM CHLORIDE 750 MG/1
40 TABLET, FILM COATED, EXTENDED RELEASE ORAL DAILY
Status: DISCONTINUED | OUTPATIENT
Start: 2017-01-01 | End: 2017-01-01

## 2017-01-01 RX ORDER — FENTANYL 100 UG/H
2 PATCH TRANSDERMAL
Status: DISCONTINUED | OUTPATIENT
Start: 2017-01-01 | End: 2017-01-01

## 2017-01-01 RX ORDER — SODIUM CHLORIDE 0.9 % (FLUSH) 0.9 %
10 SYRINGE (ML) INJECTION EVERY 24 HOURS
Status: DISCONTINUED | OUTPATIENT
Start: 2017-01-01 | End: 2017-01-01 | Stop reason: HOSPADM

## 2017-01-01 RX ORDER — FAMOTIDINE 20 MG/1
20 TABLET, FILM COATED ORAL 2 TIMES DAILY
Status: DISCONTINUED | OUTPATIENT
Start: 2017-01-01 | End: 2017-01-01 | Stop reason: HOSPADM

## 2017-01-01 RX ORDER — POTASSIUM CHLORIDE 750 MG/1
40 TABLET, FILM COATED, EXTENDED RELEASE ORAL 2 TIMES DAILY
Status: DISCONTINUED | OUTPATIENT
Start: 2017-01-01 | End: 2017-01-01

## 2017-01-01 RX ORDER — ACETAMINOPHEN 10 MG/ML
INJECTION, SOLUTION INTRAVENOUS
Status: COMPLETED
Start: 2017-01-01 | End: 2017-01-01

## 2017-01-01 RX ORDER — SODIUM CHLORIDE 0.9 % (FLUSH) 0.9 %
20 SYRINGE (ML) INJECTION AS NEEDED
Status: DISCONTINUED | OUTPATIENT
Start: 2017-01-01 | End: 2017-01-01 | Stop reason: HOSPADM

## 2017-01-01 RX ORDER — DIAZEPAM 10 MG/2ML
5 INJECTION INTRAMUSCULAR
Status: COMPLETED | OUTPATIENT
Start: 2017-01-01 | End: 2017-01-01

## 2017-01-01 RX ORDER — LORAZEPAM 2 MG/ML
1 INJECTION INTRAMUSCULAR
Status: DISCONTINUED | OUTPATIENT
Start: 2017-01-01 | End: 2017-01-01 | Stop reason: HOSPADM

## 2017-01-01 RX ORDER — BUPROPION HYDROCHLORIDE 150 MG/1
150 TABLET, EXTENDED RELEASE ORAL 2 TIMES DAILY
Status: DISCONTINUED | OUTPATIENT
Start: 2017-01-01 | End: 2017-01-01 | Stop reason: HOSPADM

## 2017-01-01 RX ORDER — HYDROCODONE BITARTRATE AND ACETAMINOPHEN 10; 325 MG/1; MG/1
1 TABLET ORAL
Qty: 15 TAB | Refills: 0 | Status: SHIPPED | OUTPATIENT
Start: 2017-01-01

## 2017-01-01 RX ORDER — METOPROLOL SUCCINATE 25 MG/1
25 TABLET, EXTENDED RELEASE ORAL DAILY
Status: DISCONTINUED | OUTPATIENT
Start: 2017-01-01 | End: 2017-01-01 | Stop reason: HOSPADM

## 2017-01-01 RX ORDER — POTASSIUM CHLORIDE 20MEQ/15ML
40 LIQUID (ML) ORAL
Status: COMPLETED | OUTPATIENT
Start: 2017-01-01 | End: 2017-01-01

## 2017-01-01 RX ORDER — HYDROMORPHONE HYDROCHLORIDE 2 MG/ML
1.5 INJECTION, SOLUTION INTRAMUSCULAR; INTRAVENOUS; SUBCUTANEOUS
Status: DISCONTINUED | OUTPATIENT
Start: 2017-01-01 | End: 2017-01-01

## 2017-01-01 RX ORDER — ESCITALOPRAM OXALATE 10 MG/1
10 TABLET ORAL DAILY
Status: DISCONTINUED | OUTPATIENT
Start: 2017-01-01 | End: 2017-01-01 | Stop reason: HOSPADM

## 2017-01-01 RX ORDER — SODIUM CHLORIDE 9 MG/ML
25 INJECTION, SOLUTION INTRAVENOUS CONTINUOUS
Status: DISCONTINUED | OUTPATIENT
Start: 2017-01-01 | End: 2017-01-01 | Stop reason: HOSPADM

## 2017-01-01 RX ORDER — KETOROLAC TROMETHAMINE 30 MG/ML
15 INJECTION, SOLUTION INTRAMUSCULAR; INTRAVENOUS
Status: COMPLETED | OUTPATIENT
Start: 2017-01-01 | End: 2017-01-01

## 2017-01-01 RX ORDER — LIDOCAINE HYDROCHLORIDE 10 MG/ML
0.1 INJECTION, SOLUTION EPIDURAL; INFILTRATION; INTRACAUDAL; PERINEURAL AS NEEDED
Status: DISCONTINUED | OUTPATIENT
Start: 2017-01-01 | End: 2017-01-01 | Stop reason: HOSPADM

## 2017-01-01 RX ORDER — HYDROCORTISONE SODIUM SUCCINATE 100 MG/2ML
100 INJECTION, POWDER, FOR SOLUTION INTRAMUSCULAR; INTRAVENOUS ONCE
Status: COMPLETED | OUTPATIENT
Start: 2017-01-01 | End: 2017-01-01

## 2017-01-01 RX ORDER — OXYCODONE AND ACETAMINOPHEN 5; 325 MG/1; MG/1
1 TABLET ORAL AS NEEDED
Status: DISCONTINUED | OUTPATIENT
Start: 2017-01-01 | End: 2017-01-01 | Stop reason: HOSPADM

## 2017-01-01 RX ORDER — CYCLOBENZAPRINE HCL 10 MG
5 TABLET ORAL
Status: COMPLETED | OUTPATIENT
Start: 2017-01-01 | End: 2017-01-01

## 2017-01-01 RX ORDER — CALCIUM CARBONATE 200(500)MG
200 TABLET,CHEWABLE ORAL
Status: DISCONTINUED | OUTPATIENT
Start: 2017-01-01 | End: 2017-01-01

## 2017-01-01 RX ORDER — HYDROMORPHONE HYDROCHLORIDE 1 MG/ML
.5-1 INJECTION, SOLUTION INTRAMUSCULAR; INTRAVENOUS; SUBCUTANEOUS
Status: DISCONTINUED | OUTPATIENT
Start: 2017-01-01 | End: 2017-01-01 | Stop reason: HOSPADM

## 2017-01-01 RX ORDER — FENTANYL 100 UG/H
1 PATCH TRANSDERMAL
Status: DISCONTINUED | OUTPATIENT
Start: 2017-01-01 | End: 2017-01-01 | Stop reason: HOSPADM

## 2017-01-01 RX ORDER — HYDROCODONE BITARTRATE AND ACETAMINOPHEN 10; 325 MG/1; MG/1
1 TABLET ORAL
Status: DISCONTINUED | OUTPATIENT
Start: 2017-01-01 | End: 2017-01-01 | Stop reason: HOSPADM

## 2017-01-01 RX ORDER — FACIAL-BODY WIPES
10 EACH TOPICAL DAILY PRN
Status: DISCONTINUED | OUTPATIENT
Start: 2017-01-01 | End: 2017-01-01 | Stop reason: HOSPADM

## 2017-01-01 RX ORDER — AMOXICILLIN 250 MG
2 CAPSULE ORAL DAILY
Status: DISCONTINUED | OUTPATIENT
Start: 2017-01-01 | End: 2017-01-01

## 2017-01-01 RX ORDER — CALCITONIN SALMON 200 [USP'U]/ML
100 INJECTION, SOLUTION INTRAMUSCULAR; SUBCUTANEOUS EVERY 12 HOURS
Status: DISCONTINUED | OUTPATIENT
Start: 2017-01-01 | End: 2017-01-01

## 2017-01-01 RX ORDER — LEVOFLOXACIN 5 MG/ML
750 INJECTION, SOLUTION INTRAVENOUS EVERY 24 HOURS
Status: DISCONTINUED | OUTPATIENT
Start: 2017-01-01 | End: 2017-01-01 | Stop reason: HOSPADM

## 2017-01-01 RX ORDER — DIPHENHYDRAMINE HYDROCHLORIDE 50 MG/ML
12.5 INJECTION, SOLUTION INTRAMUSCULAR; INTRAVENOUS
Status: DISCONTINUED | OUTPATIENT
Start: 2017-01-01 | End: 2017-01-01 | Stop reason: HOSPADM

## 2017-01-01 RX ORDER — FAMOTIDINE 20 MG/1
20 TABLET, FILM COATED ORAL 2 TIMES DAILY
Status: DISCONTINUED | OUTPATIENT
Start: 2017-01-01 | End: 2017-01-01

## 2017-01-01 RX ORDER — CALCITONIN SALMON 200 [USP'U]/ML
4 INJECTION, SOLUTION INTRAMUSCULAR; SUBCUTANEOUS ONCE
Status: DISCONTINUED | OUTPATIENT
Start: 2017-01-01 | End: 2017-01-01

## 2017-01-01 RX ORDER — LEVOFLOXACIN 5 MG/ML
750 INJECTION, SOLUTION INTRAVENOUS EVERY 24 HOURS
Status: DISCONTINUED | OUTPATIENT
Start: 2017-01-01 | End: 2017-01-01

## 2017-01-01 RX ORDER — MORPHINE SULFATE 2 MG/ML
2 INJECTION, SOLUTION INTRAMUSCULAR; INTRAVENOUS ONCE
Status: COMPLETED | OUTPATIENT
Start: 2017-01-01 | End: 2017-01-01

## 2017-01-01 RX ORDER — HYDROMORPHONE HYDROCHLORIDE 1 MG/ML
1 INJECTION, SOLUTION INTRAMUSCULAR; INTRAVENOUS; SUBCUTANEOUS ONCE
Status: ACTIVE | OUTPATIENT
Start: 2017-01-01 | End: 2017-01-01

## 2017-01-01 RX ORDER — MIDAZOLAM HYDROCHLORIDE 1 MG/ML
1 INJECTION, SOLUTION INTRAMUSCULAR; INTRAVENOUS AS NEEDED
Status: DISCONTINUED | OUTPATIENT
Start: 2017-01-01 | End: 2017-01-01 | Stop reason: HOSPADM

## 2017-01-01 RX ORDER — SENNOSIDES 8.6 MG/1
1 TABLET ORAL DAILY
Status: DISCONTINUED | OUTPATIENT
Start: 2017-01-01 | End: 2017-01-01

## 2017-01-01 RX ORDER — FENTANYL 100 UG/H
1 PATCH TRANSDERMAL
Status: DISCONTINUED | OUTPATIENT
Start: 2017-01-01 | End: 2017-01-01

## 2017-01-01 RX ORDER — CYCLOBENZAPRINE HCL 10 MG
10 TABLET ORAL
Status: DISCONTINUED | OUTPATIENT
Start: 2017-01-01 | End: 2017-01-01

## 2017-01-01 RX ORDER — ENOXAPARIN SODIUM 100 MG/ML
1 INJECTION SUBCUTANEOUS ONCE
Status: DISCONTINUED | OUTPATIENT
Start: 2017-01-01 | End: 2017-01-01

## 2017-01-01 RX ORDER — HYDROMORPHONE HYDROCHLORIDE 1 MG/ML
.2-.5 INJECTION, SOLUTION INTRAMUSCULAR; INTRAVENOUS; SUBCUTANEOUS
Status: DISCONTINUED | OUTPATIENT
Start: 2017-01-01 | End: 2017-01-01 | Stop reason: HOSPADM

## 2017-01-01 RX ORDER — POTASSIUM CHLORIDE 7.45 MG/ML
10 INJECTION INTRAVENOUS
Status: COMPLETED | OUTPATIENT
Start: 2017-01-01 | End: 2017-01-01

## 2017-01-01 RX ORDER — FENTANYL 25 UG/1
1 PATCH TRANSDERMAL
Status: DISCONTINUED | OUTPATIENT
Start: 2017-01-01 | End: 2017-01-01

## 2017-01-01 RX ORDER — FENTANYL CITRATE 50 UG/ML
100 INJECTION, SOLUTION INTRAMUSCULAR; INTRAVENOUS
Status: COMPLETED | OUTPATIENT
Start: 2017-01-01 | End: 2017-01-01

## 2017-01-01 RX ORDER — ACETAMINOPHEN 325 MG/1
650 TABLET ORAL
Status: DISCONTINUED | OUTPATIENT
Start: 2017-01-01 | End: 2017-01-01

## 2017-01-01 RX ORDER — SODIUM CHLORIDE, SODIUM LACTATE, POTASSIUM CHLORIDE, CALCIUM CHLORIDE 600; 310; 30; 20 MG/100ML; MG/100ML; MG/100ML; MG/100ML
INJECTION, SOLUTION INTRAVENOUS
Status: DISCONTINUED | OUTPATIENT
Start: 2017-01-01 | End: 2017-01-01 | Stop reason: HOSPADM

## 2017-01-01 RX ORDER — CYCLOBENZAPRINE HCL 10 MG
10 TABLET ORAL
Qty: 10 TAB | Refills: 0 | Status: SHIPPED | OUTPATIENT
Start: 2017-01-01

## 2017-01-01 RX ORDER — GLYCOPYRROLATE 0.2 MG/ML
0.2 INJECTION INTRAMUSCULAR; INTRAVENOUS
Status: DISCONTINUED | OUTPATIENT
Start: 2017-01-01 | End: 2017-01-01 | Stop reason: HOSPADM

## 2017-01-01 RX ORDER — KETOROLAC TROMETHAMINE 30 MG/ML
INJECTION, SOLUTION INTRAMUSCULAR; INTRAVENOUS AS NEEDED
Status: DISCONTINUED | OUTPATIENT
Start: 2017-01-01 | End: 2017-01-01 | Stop reason: HOSPADM

## 2017-01-01 RX ORDER — SODIUM,POTASSIUM PHOSPHATES 280-250MG
1 POWDER IN PACKET (EA) ORAL 4 TIMES DAILY
Status: DISCONTINUED | OUTPATIENT
Start: 2017-01-01 | End: 2017-01-01

## 2017-01-01 RX ORDER — DIPHENHYDRAMINE HYDROCHLORIDE 50 MG/ML
25 INJECTION, SOLUTION INTRAMUSCULAR; INTRAVENOUS ONCE
Status: COMPLETED | OUTPATIENT
Start: 2017-01-01 | End: 2017-01-01

## 2017-01-01 RX ORDER — HYDROMORPHONE HYDROCHLORIDE 2 MG/ML
2 INJECTION, SOLUTION INTRAMUSCULAR; INTRAVENOUS; SUBCUTANEOUS ONCE
Status: ACTIVE | OUTPATIENT
Start: 2017-01-01 | End: 2017-01-01

## 2017-01-01 RX ORDER — SODIUM CHLORIDE 0.9 % (FLUSH) 0.9 %
10 SYRINGE (ML) INJECTION EVERY 8 HOURS
Status: DISCONTINUED | OUTPATIENT
Start: 2017-01-01 | End: 2017-01-01 | Stop reason: HOSPADM

## 2017-01-01 RX ORDER — METHADONE HYDROCHLORIDE 10 MG/ML
10 INJECTION, SOLUTION INTRAMUSCULAR; INTRAVENOUS; SUBCUTANEOUS
Status: COMPLETED | OUTPATIENT
Start: 2017-01-01 | End: 2017-01-01

## 2017-01-01 RX ADMIN — DIPHENHYDRAMINE HYDROCHLORIDE 25 MG: 25 CAPSULE ORAL at 18:31

## 2017-01-01 RX ADMIN — ONDANSETRON 4 MG: 2 INJECTION INTRAMUSCULAR; INTRAVENOUS at 08:03

## 2017-01-01 RX ADMIN — Medication 10 ML: at 17:50

## 2017-01-01 RX ADMIN — POTASSIUM CHLORIDE 40 MEQ: 750 TABLET, FILM COATED, EXTENDED RELEASE ORAL at 09:20

## 2017-01-01 RX ADMIN — SENNOSIDES 17.2 MG: 8.6 TABLET, FILM COATED ORAL at 09:51

## 2017-01-01 RX ADMIN — POTASSIUM & SODIUM PHOSPHATES POWDER PACK 280-160-250 MG 1 PACKET: 280-160-250 PACK at 21:25

## 2017-01-01 RX ADMIN — PROPOFOL 150 MG: 10 INJECTION, EMULSION INTRAVENOUS at 07:38

## 2017-01-01 RX ADMIN — BUPROPION HYDROCHLORIDE 150 MG: 150 TABLET, EXTENDED RELEASE ORAL at 21:39

## 2017-01-01 RX ADMIN — MAGNESIUM SULFATE HEPTAHYDRATE 2 G: 40 INJECTION, SOLUTION INTRAVENOUS at 06:55

## 2017-01-01 RX ADMIN — OXYCODONE HYDROCHLORIDE AND ACETAMINOPHEN 1 TABLET: 10; 325 TABLET ORAL at 17:55

## 2017-01-01 RX ADMIN — BUPROPION HYDROCHLORIDE 150 MG: 150 TABLET, EXTENDED RELEASE ORAL at 20:47

## 2017-01-01 RX ADMIN — DIPHENHYDRAMINE HYDROCHLORIDE 25 MG: 25 CAPSULE ORAL at 06:00

## 2017-01-01 RX ADMIN — Medication 10 ML: at 23:12

## 2017-01-01 RX ADMIN — POTASSIUM CHLORIDE 40 MEQ: 750 TABLET, FILM COATED, EXTENDED RELEASE ORAL at 18:29

## 2017-01-01 RX ADMIN — HYDROCODONE BITARTRATE AND ACETAMINOPHEN 1 TABLET: 325; 10 TABLET ORAL at 07:14

## 2017-01-01 RX ADMIN — HYDROMORPHONE HYDROCHLORIDE 0.5 MG: 1 INJECTION, SOLUTION INTRAMUSCULAR; INTRAVENOUS; SUBCUTANEOUS at 11:30

## 2017-01-01 RX ADMIN — FENTANYL CITRATE 50 MCG: 50 INJECTION, SOLUTION INTRAMUSCULAR; INTRAVENOUS at 17:05

## 2017-01-01 RX ADMIN — HYDROCODONE BITARTRATE AND ACETAMINOPHEN 1 TABLET: 10; 325 TABLET ORAL at 17:22

## 2017-01-01 RX ADMIN — PIPERACILLIN SODIUM AND TAZOBACTAM SODIUM 3.38 G: 3; .375 INJECTION, POWDER, LYOPHILIZED, FOR SOLUTION INTRAVENOUS at 20:38

## 2017-01-01 RX ADMIN — BACLOFEN 5 MG: 10 TABLET ORAL at 17:22

## 2017-01-01 RX ADMIN — BUPROPION HYDROCHLORIDE 150 MG: 150 TABLET, EXTENDED RELEASE ORAL at 23:35

## 2017-01-01 RX ADMIN — Medication 2 MG: at 19:25

## 2017-01-01 RX ADMIN — IPRATROPIUM BROMIDE AND ALBUTEROL SULFATE 3 ML: .5; 3 SOLUTION RESPIRATORY (INHALATION) at 19:26

## 2017-01-01 RX ADMIN — HYDROMORPHONE HYDROCHLORIDE 1 MG: 1 INJECTION, SOLUTION INTRAMUSCULAR; INTRAVENOUS; SUBCUTANEOUS at 08:13

## 2017-01-01 RX ADMIN — Medication 10 ML: at 07:24

## 2017-01-01 RX ADMIN — HYDROCODONE BITARTRATE AND ACETAMINOPHEN 1 TABLET: 325; 10 TABLET ORAL at 16:55

## 2017-01-01 RX ADMIN — POTASSIUM & SODIUM PHOSPHATES POWDER PACK 280-160-250 MG 1 PACKET: 280-160-250 PACK at 17:20

## 2017-01-01 RX ADMIN — BACLOFEN 5 MG: 10 TABLET ORAL at 08:48

## 2017-01-01 RX ADMIN — Medication 10 ML: at 00:18

## 2017-01-01 RX ADMIN — POLYETHYLENE GLYCOL 3350 17 G: 17 POWDER, FOR SOLUTION ORAL at 09:00

## 2017-01-01 RX ADMIN — KETOROLAC TROMETHAMINE 30 MG: 30 INJECTION, SOLUTION INTRAMUSCULAR; INTRAVENOUS at 08:19

## 2017-01-01 RX ADMIN — FAMOTIDINE 20 MG: 20 TABLET ORAL at 17:21

## 2017-01-01 RX ADMIN — Medication 10 ML: at 17:59

## 2017-01-01 RX ADMIN — BUPROPION HYDROCHLORIDE 150 MG: 150 TABLET, EXTENDED RELEASE ORAL at 09:03

## 2017-01-01 RX ADMIN — ESCITALOPRAM OXALATE 10 MG: 10 TABLET ORAL at 08:35

## 2017-01-01 RX ADMIN — GADOBUTROL 7.5 ML: 604.72 INJECTION INTRAVENOUS at 21:00

## 2017-01-01 RX ADMIN — HYDROCODONE BITARTRATE AND ACETAMINOPHEN 1 TABLET: 10; 325 TABLET ORAL at 06:40

## 2017-01-01 RX ADMIN — HYDROCODONE BITARTRATE AND ACETAMINOPHEN 1 TABLET: 325; 10 TABLET ORAL at 19:27

## 2017-01-01 RX ADMIN — POTASSIUM CHLORIDE 10 MEQ: 10 INJECTION, SOLUTION INTRAVENOUS at 09:31

## 2017-01-01 RX ADMIN — POTASSIUM CHLORIDE 40 MEQ: 750 TABLET, FILM COATED, EXTENDED RELEASE ORAL at 17:06

## 2017-01-01 RX ADMIN — HYDROMORPHONE HYDROCHLORIDE 1.5 MG: 2 INJECTION, SOLUTION INTRAMUSCULAR; INTRAVENOUS; SUBCUTANEOUS at 17:59

## 2017-01-01 RX ADMIN — KETAMINE HYDROCHLORIDE 20 MG: 10 INJECTION, SOLUTION INTRAMUSCULAR; INTRAVENOUS at 17:11

## 2017-01-01 RX ADMIN — POTASSIUM & SODIUM PHOSPHATES POWDER PACK 280-160-250 MG 1 PACKET: 280-160-250 PACK at 08:28

## 2017-01-01 RX ADMIN — SODIUM CHLORIDE 125 ML/HR: 900 INJECTION, SOLUTION INTRAVENOUS at 01:32

## 2017-01-01 RX ADMIN — OXYBUTYNIN CHLORIDE 5 MG: 5 TABLET, EXTENDED RELEASE ORAL at 09:27

## 2017-01-01 RX ADMIN — PANTOPRAZOLE SODIUM 40 MG: 40 TABLET, DELAYED RELEASE ORAL at 07:30

## 2017-01-01 RX ADMIN — HYDROMORPHONE HYDROCHLORIDE 0.5 MG: 1 INJECTION, SOLUTION INTRAMUSCULAR; INTRAVENOUS; SUBCUTANEOUS at 10:26

## 2017-01-01 RX ADMIN — Medication: at 01:26

## 2017-01-01 RX ADMIN — Medication 10 ML: at 21:07

## 2017-01-01 RX ADMIN — FUROSEMIDE 40 MG: 10 INJECTION, SOLUTION INTRAMUSCULAR; INTRAVENOUS at 13:31

## 2017-01-01 RX ADMIN — Medication 10 ML: at 05:21

## 2017-01-01 RX ADMIN — DIAZEPAM 5 MG: 5 INJECTION, SOLUTION INTRAMUSCULAR; INTRAVENOUS at 01:27

## 2017-01-01 RX ADMIN — Medication 10 ML: at 06:17

## 2017-01-01 RX ADMIN — Medication: at 15:30

## 2017-01-01 RX ADMIN — MAGNESIUM SULFATE HEPTAHYDRATE 1 G: 1 INJECTION, SOLUTION INTRAVENOUS at 13:18

## 2017-01-01 RX ADMIN — BUPROPION HYDROCHLORIDE 150 MG: 150 TABLET, EXTENDED RELEASE ORAL at 09:27

## 2017-01-01 RX ADMIN — ONDANSETRON 4 MG: 2 INJECTION INTRAMUSCULAR; INTRAVENOUS at 17:42

## 2017-01-01 RX ADMIN — MAGNESIUM SULFATE HEPTAHYDRATE 2 G: 40 INJECTION, SOLUTION INTRAVENOUS at 09:31

## 2017-01-01 RX ADMIN — ESCITALOPRAM OXALATE 10 MG: 10 TABLET ORAL at 09:51

## 2017-01-01 RX ADMIN — Medication 10 ML: at 13:33

## 2017-01-01 RX ADMIN — Medication 10 ML: at 14:00

## 2017-01-01 RX ADMIN — ESCITALOPRAM OXALATE 10 MG: 10 TABLET ORAL at 09:24

## 2017-01-01 RX ADMIN — Medication 10 ML: at 13:02

## 2017-01-01 RX ADMIN — FENTANYL CITRATE 100 MCG: 50 INJECTION, SOLUTION INTRAMUSCULAR; INTRAVENOUS at 10:38

## 2017-01-01 RX ADMIN — POTASSIUM CHLORIDE 10 MEQ: 10 INJECTION, SOLUTION INTRAVENOUS at 10:31

## 2017-01-01 RX ADMIN — Medication 10 ML: at 06:24

## 2017-01-01 RX ADMIN — Medication 10 ML: at 15:31

## 2017-01-01 RX ADMIN — Medication: at 08:06

## 2017-01-01 RX ADMIN — OXYBUTYNIN CHLORIDE 5 MG: 5 TABLET, FILM COATED, EXTENDED RELEASE ORAL at 08:48

## 2017-01-01 RX ADMIN — SODIUM CHLORIDE 125 ML/HR: 900 INJECTION, SOLUTION INTRAVENOUS at 06:02

## 2017-01-01 RX ADMIN — BUPROPION HYDROCHLORIDE 150 MG: 150 TABLET, EXTENDED RELEASE ORAL at 12:25

## 2017-01-01 RX ADMIN — POTASSIUM CHLORIDE 40 MEQ: 750 TABLET, FILM COATED, EXTENDED RELEASE ORAL at 08:17

## 2017-01-01 RX ADMIN — PRAVASTATIN SODIUM 40 MG: 40 TABLET ORAL at 21:54

## 2017-01-01 RX ADMIN — OXYBUTYNIN CHLORIDE 5 MG: 5 TABLET, EXTENDED RELEASE ORAL at 09:19

## 2017-01-01 RX ADMIN — FAMOTIDINE 20 MG: 20 TABLET ORAL at 18:03

## 2017-01-01 RX ADMIN — RIVAROXABAN 20 MG: 20 TABLET, FILM COATED ORAL at 09:47

## 2017-01-01 RX ADMIN — HYDROCODONE BITARTRATE AND ACETAMINOPHEN 1 TABLET: 325; 10 TABLET ORAL at 12:00

## 2017-01-01 RX ADMIN — SODIUM CHLORIDE 125 ML/HR: 900 INJECTION, SOLUTION INTRAVENOUS at 07:36

## 2017-01-01 RX ADMIN — BUPROPION HYDROCHLORIDE 150 MG: 150 TABLET, EXTENDED RELEASE ORAL at 21:48

## 2017-01-01 RX ADMIN — HYDROMORPHONE HYDROCHLORIDE 0.5 MG: 1 INJECTION, SOLUTION INTRAMUSCULAR; INTRAVENOUS; SUBCUTANEOUS at 17:13

## 2017-01-01 RX ADMIN — HYDROCODONE BITARTRATE AND ACETAMINOPHEN 1 TABLET: 325; 10 TABLET ORAL at 01:03

## 2017-01-01 RX ADMIN — CALCIUM CARBONATE (ANTACID) CHEW TAB 500 MG 200 MG: 500 CHEW TAB at 20:48

## 2017-01-01 RX ADMIN — ESCITALOPRAM OXALATE 10 MG: 10 TABLET ORAL at 09:49

## 2017-01-01 RX ADMIN — FAMOTIDINE 20 MG: 20 TABLET ORAL at 09:22

## 2017-01-01 RX ADMIN — Medication 10 ML: at 14:57

## 2017-01-01 RX ADMIN — LORAZEPAM 1 MG: 1 TABLET ORAL at 18:05

## 2017-01-01 RX ADMIN — SODIUM CHLORIDE 100 ML/HR: 900 INJECTION, SOLUTION INTRAVENOUS at 01:54

## 2017-01-01 RX ADMIN — Medication 10 ML: at 21:18

## 2017-01-01 RX ADMIN — DIAZEPAM 5 MG: 5 INJECTION, SOLUTION INTRAMUSCULAR; INTRAVENOUS at 08:12

## 2017-01-01 RX ADMIN — POLYETHYLENE GLYCOL 3350 17 G: 17 POWDER, FOR SOLUTION ORAL at 09:03

## 2017-01-01 RX ADMIN — ESCITALOPRAM OXALATE 10 MG: 10 TABLET ORAL at 10:07

## 2017-01-01 RX ADMIN — OXYBUTYNIN CHLORIDE 5 MG: 5 TABLET, EXTENDED RELEASE ORAL at 10:01

## 2017-01-01 RX ADMIN — FENTANYL CITRATE 100 MCG: 50 INJECTION, SOLUTION INTRAMUSCULAR; INTRAVENOUS at 19:28

## 2017-01-01 RX ADMIN — Medication 10 ML: at 19:23

## 2017-01-01 RX ADMIN — OXYCODONE HYDROCHLORIDE AND ACETAMINOPHEN 1 TABLET: 10; 325 TABLET ORAL at 13:59

## 2017-01-01 RX ADMIN — PRAVASTATIN SODIUM 40 MG: 40 TABLET ORAL at 22:30

## 2017-01-01 RX ADMIN — BACLOFEN 5 MG: 10 TABLET ORAL at 22:53

## 2017-01-01 RX ADMIN — POLYETHYLENE GLYCOL 3350 17 G: 17 POWDER, FOR SOLUTION ORAL at 10:42

## 2017-01-01 RX ADMIN — Medication 10 ML: at 06:23

## 2017-01-01 RX ADMIN — FAMOTIDINE 20 MG: 20 TABLET ORAL at 08:48

## 2017-01-01 RX ADMIN — POTASSIUM CHLORIDE 10 MEQ: 750 TABLET, FILM COATED, EXTENDED RELEASE ORAL at 08:35

## 2017-01-01 RX ADMIN — METOPROLOL SUCCINATE 25 MG: 25 TABLET, EXTENDED RELEASE ORAL at 09:28

## 2017-01-01 RX ADMIN — PANTOPRAZOLE SODIUM 40 MG: 40 TABLET, DELAYED RELEASE ORAL at 06:35

## 2017-01-01 RX ADMIN — Medication 10 ML: at 13:55

## 2017-01-01 RX ADMIN — FAMOTIDINE 20 MG: 20 TABLET ORAL at 09:50

## 2017-01-01 RX ADMIN — POLYETHYLENE GLYCOL 3350 17 G: 17 POWDER, FOR SOLUTION ORAL at 21:06

## 2017-01-01 RX ADMIN — POTASSIUM CHLORIDE 40 MEQ: 750 TABLET, FILM COATED, EXTENDED RELEASE ORAL at 11:00

## 2017-01-01 RX ADMIN — Medication 10 ML: at 05:16

## 2017-01-01 RX ADMIN — POTASSIUM CHLORIDE 40 MEQ: 750 TABLET, FILM COATED, EXTENDED RELEASE ORAL at 17:20

## 2017-01-01 RX ADMIN — FAMOTIDINE 20 MG: 20 TABLET ORAL at 18:29

## 2017-01-01 RX ADMIN — PIPERACILLIN SODIUM AND TAZOBACTAM SODIUM 4.5 G: 4; .5 INJECTION, POWDER, LYOPHILIZED, FOR SOLUTION INTRAVENOUS at 01:26

## 2017-01-01 RX ADMIN — HYDROMORPHONE HYDROCHLORIDE 1 MG: 1 INJECTION, SOLUTION INTRAMUSCULAR; INTRAVENOUS; SUBCUTANEOUS at 02:34

## 2017-01-01 RX ADMIN — CALCIUM CARBONATE (ANTACID) CHEW TAB 500 MG 200 MG: 500 CHEW TAB at 14:26

## 2017-01-01 RX ADMIN — Medication 2 MG: at 01:46

## 2017-01-01 RX ADMIN — Medication 10 ML: at 22:00

## 2017-01-01 RX ADMIN — OXYBUTYNIN CHLORIDE 5 MG: 5 TABLET, FILM COATED, EXTENDED RELEASE ORAL at 10:06

## 2017-01-01 RX ADMIN — POLYETHYLENE GLYCOL 3350 17 G: 17 POWDER, FOR SOLUTION ORAL at 09:35

## 2017-01-01 RX ADMIN — CYCLOBENZAPRINE HYDROCHLORIDE 5 MG: 10 TABLET, FILM COATED ORAL at 22:18

## 2017-01-01 RX ADMIN — Medication 10 ML: at 13:11

## 2017-01-01 RX ADMIN — BACLOFEN 5 MG: 10 TABLET ORAL at 21:06

## 2017-01-01 RX ADMIN — ONDANSETRON 4 MG: 2 INJECTION INTRAMUSCULAR; INTRAVENOUS at 16:19

## 2017-01-01 RX ADMIN — OXYBUTYNIN CHLORIDE 5 MG: 5 TABLET, FILM COATED, EXTENDED RELEASE ORAL at 09:49

## 2017-01-01 RX ADMIN — DEXTROSE MONOHYDRATE: 5 INJECTION, SOLUTION INTRAVENOUS at 21:53

## 2017-01-01 RX ADMIN — Medication 10 ML: at 07:14

## 2017-01-01 RX ADMIN — HYDROCODONE BITARTRATE AND ACETAMINOPHEN 1 TABLET: 325; 10 TABLET ORAL at 03:53

## 2017-01-01 RX ADMIN — DIAZEPAM 5 MG: 10 INJECTION INTRAMUSCULAR at 01:27

## 2017-01-01 RX ADMIN — Medication 5 ML: at 14:00

## 2017-01-01 RX ADMIN — BUPROPION HYDROCHLORIDE 150 MG: 150 TABLET, EXTENDED RELEASE ORAL at 09:35

## 2017-01-01 RX ADMIN — SENNOSIDES 17.2 MG: 8.6 TABLET, FILM COATED ORAL at 09:24

## 2017-01-01 RX ADMIN — Medication 10 ML: at 22:53

## 2017-01-01 RX ADMIN — Medication 10 ML: at 14:01

## 2017-01-01 RX ADMIN — HYDROMORPHONE HYDROCHLORIDE 1 MG: 1 INJECTION, SOLUTION INTRAMUSCULAR; INTRAVENOUS; SUBCUTANEOUS at 21:55

## 2017-01-01 RX ADMIN — ESCITALOPRAM OXALATE 10 MG: 10 TABLET ORAL at 09:22

## 2017-01-01 RX ADMIN — Medication 10 ML: at 06:46

## 2017-01-01 RX ADMIN — KETOROLAC TROMETHAMINE 30 MG: 30 INJECTION, SOLUTION INTRAMUSCULAR at 19:43

## 2017-01-01 RX ADMIN — POTASSIUM CHLORIDE 10 MEQ: 10 INJECTION, SOLUTION INTRAVENOUS at 12:45

## 2017-01-01 RX ADMIN — HYDROMORPHONE HYDROCHLORIDE 1 MG: 1 INJECTION, SOLUTION INTRAMUSCULAR; INTRAVENOUS; SUBCUTANEOUS at 14:40

## 2017-01-01 RX ADMIN — SENNOSIDES 8.6 MG: 8.6 TABLET, FILM COATED ORAL at 09:35

## 2017-01-01 RX ADMIN — SODIUM CHLORIDE 125 ML/HR: 900 INJECTION, SOLUTION INTRAVENOUS at 20:28

## 2017-01-01 RX ADMIN — POTASSIUM CHLORIDE 40 MEQ: 750 TABLET, FILM COATED, EXTENDED RELEASE ORAL at 09:22

## 2017-01-01 RX ADMIN — BACLOFEN 5 MG: 10 TABLET ORAL at 18:31

## 2017-01-01 RX ADMIN — PRAVASTATIN SODIUM 40 MG: 40 TABLET ORAL at 21:06

## 2017-01-01 RX ADMIN — Medication 10 ML: at 06:00

## 2017-01-01 RX ADMIN — Medication 10 ML: at 21:09

## 2017-01-01 RX ADMIN — RIVAROXABAN 20 MG: 20 TABLET, FILM COATED ORAL at 10:54

## 2017-01-01 RX ADMIN — MIDAZOLAM HYDROCHLORIDE 2 MG: 1 INJECTION, SOLUTION INTRAMUSCULAR; INTRAVENOUS at 07:36

## 2017-01-01 RX ADMIN — BUPROPION HYDROCHLORIDE 300 MG: 150 TABLET, FILM COATED, EXTENDED RELEASE ORAL at 06:41

## 2017-01-01 RX ADMIN — Medication 10 ML: at 20:27

## 2017-01-01 RX ADMIN — HYDROCODONE BITARTRATE AND ACETAMINOPHEN 1 TABLET: 325; 10 TABLET ORAL at 10:04

## 2017-01-01 RX ADMIN — ONDANSETRON 4 MG: 2 INJECTION INTRAMUSCULAR; INTRAVENOUS at 16:34

## 2017-01-01 RX ADMIN — PIPERACILLIN SODIUM AND TAZOBACTAM SODIUM 3.38 G: 3; .375 INJECTION, POWDER, LYOPHILIZED, FOR SOLUTION INTRAVENOUS at 06:24

## 2017-01-01 RX ADMIN — OXYBUTYNIN CHLORIDE 5 MG: 5 TABLET, EXTENDED RELEASE ORAL at 12:25

## 2017-01-01 RX ADMIN — Medication 10 ML: at 21:15

## 2017-01-01 RX ADMIN — BACLOFEN 5 MG: 10 TABLET ORAL at 22:30

## 2017-01-01 RX ADMIN — Medication 10 ML: at 04:10

## 2017-01-01 RX ADMIN — POLYETHYLENE GLYCOL 3350 17 G: 17 POWDER, FOR SOLUTION ORAL at 20:59

## 2017-01-01 RX ADMIN — Medication 10 ML: at 03:04

## 2017-01-01 RX ADMIN — FAMOTIDINE 20 MG: 20 TABLET ORAL at 18:32

## 2017-01-01 RX ADMIN — BUPROPION HYDROCHLORIDE 150 MG: 150 TABLET, EXTENDED RELEASE ORAL at 20:30

## 2017-01-01 RX ADMIN — FAMOTIDINE 20 MG: 20 TABLET ORAL at 19:00

## 2017-01-01 RX ADMIN — FAMOTIDINE 20 MG: 20 TABLET ORAL at 18:40

## 2017-01-01 RX ADMIN — HYDROMORPHONE HYDROCHLORIDE 1.5 MG: 2 INJECTION, SOLUTION INTRAMUSCULAR; INTRAVENOUS; SUBCUTANEOUS at 13:33

## 2017-01-01 RX ADMIN — OXYBUTYNIN CHLORIDE 5 MG: 5 TABLET, EXTENDED RELEASE ORAL at 09:47

## 2017-01-01 RX ADMIN — HYDROCODONE BITARTRATE AND ACETAMINOPHEN 1 TABLET: 325; 10 TABLET ORAL at 13:33

## 2017-01-01 RX ADMIN — BACLOFEN 5 MG: 10 TABLET ORAL at 16:40

## 2017-01-01 RX ADMIN — POLYETHYLENE GLYCOL 3350 17 G: 17 POWDER, FOR SOLUTION ORAL at 09:29

## 2017-01-01 RX ADMIN — HYDROMORPHONE HYDROCHLORIDE 1 MG: 1 INJECTION, SOLUTION INTRAMUSCULAR; INTRAVENOUS; SUBCUTANEOUS at 07:24

## 2017-01-01 RX ADMIN — OXYBUTYNIN CHLORIDE 5 MG: 5 TABLET, EXTENDED RELEASE ORAL at 09:22

## 2017-01-01 RX ADMIN — Medication: at 05:10

## 2017-01-01 RX ADMIN — Medication 10 ML: at 21:00

## 2017-01-01 RX ADMIN — BUPROPION HYDROCHLORIDE 150 MG: 150 TABLET, EXTENDED RELEASE ORAL at 09:24

## 2017-01-01 RX ADMIN — CALCIUM CARBONATE (ANTACID) CHEW TAB 500 MG 200 MG: 500 CHEW TAB at 03:14

## 2017-01-01 RX ADMIN — PANTOPRAZOLE SODIUM 40 MG: 40 TABLET, DELAYED RELEASE ORAL at 06:16

## 2017-01-01 RX ADMIN — POTASSIUM CHLORIDE 10 MEQ: 750 TABLET, FILM COATED, EXTENDED RELEASE ORAL at 15:31

## 2017-01-01 RX ADMIN — HYDROMORPHONE HYDROCHLORIDE 0.5 MG: 1 INJECTION, SOLUTION INTRAMUSCULAR; INTRAVENOUS; SUBCUTANEOUS at 20:19

## 2017-01-01 RX ADMIN — HYDROCODONE BITARTRATE AND ACETAMINOPHEN 1 TABLET: 325; 10 TABLET ORAL at 10:19

## 2017-01-01 RX ADMIN — Medication 10 ML: at 14:31

## 2017-01-01 RX ADMIN — POLYETHYLENE GLYCOL 3350 17 G: 17 POWDER, FOR SOLUTION ORAL at 08:35

## 2017-01-01 RX ADMIN — POTASSIUM CHLORIDE 10 MEQ: 750 TABLET, FILM COATED, EXTENDED RELEASE ORAL at 21:06

## 2017-01-01 RX ADMIN — DIPHENHYDRAMINE HYDROCHLORIDE 25 MG: 25 CAPSULE ORAL at 20:45

## 2017-01-01 RX ADMIN — POTASSIUM & SODIUM PHOSPHATES POWDER PACK 280-160-250 MG 1 PACKET: 280-160-250 PACK at 12:39

## 2017-01-01 RX ADMIN — OXYBUTYNIN CHLORIDE 5 MG: 5 TABLET, FILM COATED, EXTENDED RELEASE ORAL at 10:02

## 2017-01-01 RX ADMIN — Medication: at 11:27

## 2017-01-01 RX ADMIN — SODIUM CHLORIDE 125 ML/HR: 900 INJECTION, SOLUTION INTRAVENOUS at 23:30

## 2017-01-01 RX ADMIN — HYDROCODONE BITARTRATE AND ACETAMINOPHEN 1 TABLET: 325; 10 TABLET ORAL at 03:47

## 2017-01-01 RX ADMIN — DIPHENHYDRAMINE HYDROCHLORIDE 25 MG: 25 CAPSULE ORAL at 04:09

## 2017-01-01 RX ADMIN — POTASSIUM CHLORIDE 10 MEQ: 10 INJECTION, SOLUTION INTRAVENOUS at 11:26

## 2017-01-01 RX ADMIN — BUPROPION HYDROCHLORIDE 150 MG: 150 TABLET, EXTENDED RELEASE ORAL at 20:44

## 2017-01-01 RX ADMIN — POTASSIUM & SODIUM PHOSPHATES POWDER PACK 280-160-250 MG 1 PACKET: 280-160-250 PACK at 17:06

## 2017-01-01 RX ADMIN — Medication 10 ML: at 15:09

## 2017-01-01 RX ADMIN — PANTOPRAZOLE SODIUM 40 MG: 40 TABLET, DELAYED RELEASE ORAL at 06:57

## 2017-01-01 RX ADMIN — Medication 10 ML: at 13:36

## 2017-01-01 RX ADMIN — BUPROPION HYDROCHLORIDE 150 MG: 150 TABLET, EXTENDED RELEASE ORAL at 08:36

## 2017-01-01 RX ADMIN — OXYBUTYNIN CHLORIDE 5 MG: 5 TABLET, EXTENDED RELEASE ORAL at 09:29

## 2017-01-01 RX ADMIN — Medication 10 ML: at 13:34

## 2017-01-01 RX ADMIN — ESCITALOPRAM OXALATE 10 MG: 10 TABLET ORAL at 08:48

## 2017-01-01 RX ADMIN — POTASSIUM PHOSPHATE, MONOBASIC AND POTASSIUM PHOSPHATE, DIBASIC: 224; 236 INJECTION, SOLUTION INTRAVENOUS at 07:28

## 2017-01-01 RX ADMIN — BUPROPION HYDROCHLORIDE 150 MG: 150 TABLET, EXTENDED RELEASE ORAL at 20:52

## 2017-01-01 RX ADMIN — Medication 3 MG: at 18:30

## 2017-01-01 RX ADMIN — Medication 10 ML: at 22:24

## 2017-01-01 RX ADMIN — HYDROMORPHONE HYDROCHLORIDE 0.5 MG: 1 INJECTION, SOLUTION INTRAMUSCULAR; INTRAVENOUS; SUBCUTANEOUS at 14:29

## 2017-01-01 RX ADMIN — BUPROPION HYDROCHLORIDE 150 MG: 150 TABLET, EXTENDED RELEASE ORAL at 21:06

## 2017-01-01 RX ADMIN — BACLOFEN 5 MG: 10 TABLET ORAL at 09:22

## 2017-01-01 RX ADMIN — POTASSIUM & SODIUM PHOSPHATES POWDER PACK 280-160-250 MG 1 PACKET: 280-160-250 PACK at 08:24

## 2017-01-01 RX ADMIN — OXYCODONE HYDROCHLORIDE AND ACETAMINOPHEN 1 TABLET: 10; 325 TABLET ORAL at 09:53

## 2017-01-01 RX ADMIN — SUCCINYLCHOLINE CHLORIDE 120 MG: 20 INJECTION INTRAMUSCULAR; INTRAVENOUS at 16:34

## 2017-01-01 RX ADMIN — ENOXAPARIN SODIUM 70 MG: 80 INJECTION SUBCUTANEOUS at 15:17

## 2017-01-01 RX ADMIN — OXYBUTYNIN CHLORIDE 5 MG: 5 TABLET, EXTENDED RELEASE ORAL at 09:07

## 2017-01-01 RX ADMIN — RIVAROXABAN 20 MG: 20 TABLET, FILM COATED ORAL at 08:28

## 2017-01-01 RX ADMIN — SODIUM CHLORIDE 125 ML/HR: 900 INJECTION, SOLUTION INTRAVENOUS at 10:06

## 2017-01-01 RX ADMIN — ROCURONIUM BROMIDE 5 MG: 10 INJECTION, SOLUTION INTRAVENOUS at 16:34

## 2017-01-01 RX ADMIN — POLYETHYLENE GLYCOL 3350 17 G: 17 POWDER, FOR SOLUTION ORAL at 20:30

## 2017-01-01 RX ADMIN — FENTANYL CITRATE 100 MCG: 50 INJECTION, SOLUTION INTRAMUSCULAR; INTRAVENOUS at 04:16

## 2017-01-01 RX ADMIN — HYDROCORTISONE SODIUM SUCCINATE 100 MG: 100 INJECTION, POWDER, FOR SOLUTION INTRAMUSCULAR; INTRAVENOUS at 07:20

## 2017-01-01 RX ADMIN — FAMOTIDINE 20 MG: 20 TABLET ORAL at 10:40

## 2017-01-01 RX ADMIN — BUPROPION HYDROCHLORIDE 150 MG: 150 TABLET, EXTENDED RELEASE ORAL at 09:47

## 2017-01-01 RX ADMIN — RIVAROXABAN 20 MG: 20 TABLET, FILM COATED ORAL at 08:17

## 2017-01-01 RX ADMIN — ONDANSETRON 4 MG: 2 INJECTION INTRAMUSCULAR; INTRAVENOUS at 20:44

## 2017-01-01 RX ADMIN — PIPERACILLIN SODIUM AND TAZOBACTAM SODIUM 4.5 G: 4; .5 INJECTION, POWDER, LYOPHILIZED, FOR SOLUTION INTRAVENOUS at 19:14

## 2017-01-01 RX ADMIN — HYDROMORPHONE HYDROCHLORIDE 1.5 MG: 2 INJECTION, SOLUTION INTRAMUSCULAR; INTRAVENOUS; SUBCUTANEOUS at 19:07

## 2017-01-01 RX ADMIN — POTASSIUM CHLORIDE 40 MEQ: 750 TABLET, FILM COATED, EXTENDED RELEASE ORAL at 09:35

## 2017-01-01 RX ADMIN — ONDANSETRON 4 MG: 2 INJECTION INTRAMUSCULAR; INTRAVENOUS at 05:56

## 2017-01-01 RX ADMIN — ESCITALOPRAM OXALATE 10 MG: 10 TABLET ORAL at 09:03

## 2017-01-01 RX ADMIN — CYCLOBENZAPRINE HYDROCHLORIDE 10 MG: 10 TABLET, FILM COATED ORAL at 11:08

## 2017-01-01 RX ADMIN — HYDROMORPHONE HYDROCHLORIDE 1.5 MG: 2 INJECTION, SOLUTION INTRAMUSCULAR; INTRAVENOUS; SUBCUTANEOUS at 15:09

## 2017-01-01 RX ADMIN — ESCITALOPRAM OXALATE 10 MG: 10 TABLET ORAL at 09:07

## 2017-01-01 RX ADMIN — DIPHENHYDRAMINE HYDROCHLORIDE 25 MG: 50 INJECTION, SOLUTION INTRAMUSCULAR; INTRAVENOUS at 06:14

## 2017-01-01 RX ADMIN — Medication 10 ML: at 06:02

## 2017-01-01 RX ADMIN — BUPROPION HYDROCHLORIDE 150 MG: 150 TABLET, EXTENDED RELEASE ORAL at 20:21

## 2017-01-01 RX ADMIN — BUPROPION HYDROCHLORIDE 150 MG: 150 TABLET, EXTENDED RELEASE ORAL at 09:22

## 2017-01-01 RX ADMIN — Medication 10 ML: at 07:01

## 2017-01-01 RX ADMIN — ROCURONIUM BROMIDE 25 MG: 10 INJECTION, SOLUTION INTRAVENOUS at 16:40

## 2017-01-01 RX ADMIN — FENTANYL CITRATE 75 MCG: 50 INJECTION, SOLUTION INTRAMUSCULAR; INTRAVENOUS at 08:30

## 2017-01-01 RX ADMIN — GLYCOPYRROLATE 0.2 MG: 0.2 INJECTION, SOLUTION INTRAMUSCULAR; INTRAVENOUS at 00:36

## 2017-01-01 RX ADMIN — POLYETHYLENE GLYCOL 3350 17 G: 17 POWDER, FOR SOLUTION ORAL at 09:50

## 2017-01-01 RX ADMIN — PROPOFOL 200 MG: 10 INJECTION, EMULSION INTRAVENOUS at 16:34

## 2017-01-01 RX ADMIN — HYDROCODONE BITARTRATE AND ACETAMINOPHEN 1 TABLET: 325; 10 TABLET ORAL at 18:23

## 2017-01-01 RX ADMIN — OXYBUTYNIN CHLORIDE 5 MG: 5 TABLET, FILM COATED, EXTENDED RELEASE ORAL at 08:31

## 2017-01-01 RX ADMIN — PANTOPRAZOLE SODIUM 40 MG: 40 TABLET, DELAYED RELEASE ORAL at 07:25

## 2017-01-01 RX ADMIN — CEFAZOLIN 2 G: 10 INJECTION, POWDER, FOR SOLUTION INTRAVENOUS; PARENTERAL at 16:44

## 2017-01-01 RX ADMIN — HYDROCODONE BITARTRATE AND ACETAMINOPHEN 1 TABLET: 325; 10 TABLET ORAL at 05:16

## 2017-01-01 RX ADMIN — BUPROPION HYDROCHLORIDE 150 MG: 150 TABLET, EXTENDED RELEASE ORAL at 08:17

## 2017-01-01 RX ADMIN — Medication: at 22:59

## 2017-01-01 RX ADMIN — SODIUM CHLORIDE 125 ML/HR: 900 INJECTION, SOLUTION INTRAVENOUS at 11:55

## 2017-01-01 RX ADMIN — Medication 10 ML: at 12:19

## 2017-01-01 RX ADMIN — RIVAROXABAN 20 MG: 20 TABLET, FILM COATED ORAL at 12:25

## 2017-01-01 RX ADMIN — PRAVASTATIN SODIUM 40 MG: 40 TABLET ORAL at 21:46

## 2017-01-01 RX ADMIN — HYDROCODONE BITARTRATE AND ACETAMINOPHEN 1 TABLET: 325; 10 TABLET ORAL at 18:28

## 2017-01-01 RX ADMIN — SODIUM CHLORIDE 100 ML: 900 INJECTION, SOLUTION INTRAVENOUS at 12:05

## 2017-01-01 RX ADMIN — HYDROCODONE BITARTRATE AND ACETAMINOPHEN 1 TABLET: 10; 325 TABLET ORAL at 18:59

## 2017-01-01 RX ADMIN — SODIUM CHLORIDE 125 ML/HR: 900 INJECTION, SOLUTION INTRAVENOUS at 11:29

## 2017-01-01 RX ADMIN — HYDROCODONE BITARTRATE AND ACETAMINOPHEN 1 TABLET: 325; 10 TABLET ORAL at 02:18

## 2017-01-01 RX ADMIN — HYDROCODONE BITARTRATE AND ACETAMINOPHEN 1 TABLET: 325; 10 TABLET ORAL at 05:12

## 2017-01-01 RX ADMIN — POLYETHYLENE GLYCOL 3350 17 G: 17 POWDER, FOR SOLUTION ORAL at 21:14

## 2017-01-01 RX ADMIN — BUPROPION HYDROCHLORIDE 150 MG: 150 TABLET, EXTENDED RELEASE ORAL at 08:28

## 2017-01-01 RX ADMIN — HYDROCODONE BITARTRATE AND ACETAMINOPHEN 1 TABLET: 325; 10 TABLET ORAL at 18:29

## 2017-01-01 RX ADMIN — DOCUSATE SODIUM AND SENNOSIDES 2 TABLET: 8.6; 5 TABLET, FILM COATED ORAL at 09:49

## 2017-01-01 RX ADMIN — HYDROCODONE BITARTRATE AND ACETAMINOPHEN 1 TABLET: 325; 10 TABLET ORAL at 13:19

## 2017-01-01 RX ADMIN — VANCOMYCIN HYDROCHLORIDE 1750 MG: 10 INJECTION, POWDER, LYOPHILIZED, FOR SOLUTION INTRAVENOUS at 19:09

## 2017-01-01 RX ADMIN — Medication 10 ML: at 05:56

## 2017-01-01 RX ADMIN — HYDROCODONE BITARTRATE AND ACETAMINOPHEN 1 TABLET: 325; 10 TABLET ORAL at 07:05

## 2017-01-01 RX ADMIN — Medication 10 ML: at 15:25

## 2017-01-01 RX ADMIN — LEVOFLOXACIN 750 MG: 5 INJECTION, SOLUTION INTRAVENOUS at 19:12

## 2017-01-01 RX ADMIN — LORAZEPAM 1 MG: 2 INJECTION INTRAMUSCULAR; INTRAVENOUS at 01:10

## 2017-01-01 RX ADMIN — KETOROLAC TROMETHAMINE 30 MG: 30 INJECTION, SOLUTION INTRAMUSCULAR; INTRAVENOUS at 19:43

## 2017-01-01 RX ADMIN — DEXAMETHASONE SODIUM PHOSPHATE 8 MG: 4 INJECTION, SOLUTION INTRA-ARTICULAR; INTRALESIONAL; INTRAMUSCULAR; INTRAVENOUS; SOFT TISSUE at 17:00

## 2017-01-01 RX ADMIN — BUPROPION HYDROCHLORIDE 150 MG: 150 TABLET, EXTENDED RELEASE ORAL at 09:20

## 2017-01-01 RX ADMIN — Medication 10 ML: at 14:09

## 2017-01-01 RX ADMIN — OXYBUTYNIN CHLORIDE 5 MG: 5 TABLET, EXTENDED RELEASE ORAL at 09:51

## 2017-01-01 RX ADMIN — OXYCODONE HYDROCHLORIDE AND ACETAMINOPHEN 1 TABLET: 10; 325 TABLET ORAL at 21:45

## 2017-01-01 RX ADMIN — OXYBUTYNIN CHLORIDE 5 MG: 5 TABLET, EXTENDED RELEASE ORAL at 09:03

## 2017-01-01 RX ADMIN — SENNOSIDES 17.2 MG: 8.6 TABLET, FILM COATED ORAL at 08:36

## 2017-01-01 RX ADMIN — POTASSIUM & SODIUM PHOSPHATES POWDER PACK 280-160-250 MG 1 PACKET: 280-160-250 PACK at 12:00

## 2017-01-01 RX ADMIN — CYCLOBENZAPRINE HYDROCHLORIDE 10 MG: 10 TABLET, FILM COATED ORAL at 21:49

## 2017-01-01 RX ADMIN — BACLOFEN 5 MG: 10 TABLET ORAL at 10:50

## 2017-01-01 RX ADMIN — FAMOTIDINE 20 MG: 20 TABLET ORAL at 17:57

## 2017-01-01 RX ADMIN — CALCITONIN SALMON 100 INT'L UNITS: 200 INJECTION, SOLUTION INTRAMUSCULAR; SUBCUTANEOUS at 13:14

## 2017-01-01 RX ADMIN — METOPROLOL SUCCINATE 25 MG: 25 TABLET, EXTENDED RELEASE ORAL at 09:07

## 2017-01-01 RX ADMIN — BUPROPION HYDROCHLORIDE 150 MG: 150 TABLET, EXTENDED RELEASE ORAL at 21:24

## 2017-01-01 RX ADMIN — Medication 10 ML: at 05:11

## 2017-01-01 RX ADMIN — SENNOSIDES 17.2 MG: 8.6 TABLET, FILM COATED ORAL at 09:10

## 2017-01-01 RX ADMIN — FUROSEMIDE 20 MG: 10 INJECTION, SOLUTION INTRAMUSCULAR; INTRAVENOUS at 14:12

## 2017-01-01 RX ADMIN — Medication 10 ML: at 07:11

## 2017-01-01 RX ADMIN — PANTOPRAZOLE SODIUM 40 MG: 40 TABLET, DELAYED RELEASE ORAL at 07:05

## 2017-01-01 RX ADMIN — LIDOCAINE HYDROCHLORIDE 100 MG: 20 INJECTION, SOLUTION EPIDURAL; INFILTRATION; INTRACAUDAL; PERINEURAL at 07:38

## 2017-01-01 RX ADMIN — BUPROPION HYDROCHLORIDE 150 MG: 150 TABLET, EXTENDED RELEASE ORAL at 08:25

## 2017-01-01 RX ADMIN — BUPROPION HYDROCHLORIDE 150 MG: 150 TABLET, EXTENDED RELEASE ORAL at 09:10

## 2017-01-01 RX ADMIN — PANTOPRAZOLE SODIUM 40 MG: 40 TABLET, DELAYED RELEASE ORAL at 06:34

## 2017-01-01 RX ADMIN — METOPROLOL SUCCINATE 25 MG: 25 TABLET, EXTENDED RELEASE ORAL at 09:03

## 2017-01-01 RX ADMIN — ONDANSETRON 4 MG: 2 INJECTION INTRAMUSCULAR; INTRAVENOUS at 09:25

## 2017-01-01 RX ADMIN — POLYETHYLENE GLYCOL 3350 17 G: 17 POWDER, FOR SOLUTION ORAL at 09:06

## 2017-01-01 RX ADMIN — BUPROPION HYDROCHLORIDE 150 MG: 150 TABLET, EXTENDED RELEASE ORAL at 09:28

## 2017-01-01 RX ADMIN — SENNOSIDES 17.2 MG: 8.6 TABLET, FILM COATED ORAL at 09:06

## 2017-01-01 RX ADMIN — LIDOCAINE HYDROCHLORIDE 70 MG: 20 INJECTION, SOLUTION EPIDURAL; INFILTRATION; INTRACAUDAL; PERINEURAL at 16:34

## 2017-01-01 RX ADMIN — HYDROMORPHONE HYDROCHLORIDE 1 MG: 1 INJECTION, SOLUTION INTRAMUSCULAR; INTRAVENOUS; SUBCUTANEOUS at 21:05

## 2017-01-01 RX ADMIN — OXYCODONE HYDROCHLORIDE AND ACETAMINOPHEN 1 TABLET: 10; 325 TABLET ORAL at 22:30

## 2017-01-01 RX ADMIN — BUPROPION HYDROCHLORIDE 150 MG: 150 TABLET, EXTENDED RELEASE ORAL at 21:49

## 2017-01-01 RX ADMIN — Medication 10 ML: at 10:41

## 2017-01-01 RX ADMIN — CALCIUM CARBONATE (ANTACID) CHEW TAB 500 MG 200 MG: 500 CHEW TAB at 20:23

## 2017-01-01 RX ADMIN — RIVAROXABAN 20 MG: 20 TABLET, FILM COATED ORAL at 08:41

## 2017-01-01 RX ADMIN — PRAVASTATIN SODIUM 40 MG: 40 TABLET ORAL at 21:17

## 2017-01-01 RX ADMIN — POTASSIUM CHLORIDE 10 MEQ: 200 INJECTION, SOLUTION INTRAVENOUS at 07:00

## 2017-01-01 RX ADMIN — POTASSIUM CHLORIDE 10 MEQ: 200 INJECTION, SOLUTION INTRAVENOUS at 07:55

## 2017-01-01 RX ADMIN — PANTOPRAZOLE SODIUM 40 MG: 40 TABLET, DELAYED RELEASE ORAL at 07:14

## 2017-01-01 RX ADMIN — POTASSIUM CHLORIDE 40 MEQ: 750 TABLET, FILM COATED, EXTENDED RELEASE ORAL at 08:28

## 2017-01-01 RX ADMIN — HYDROMORPHONE HYDROCHLORIDE 1.5 MG: 2 INJECTION, SOLUTION INTRAMUSCULAR; INTRAVENOUS; SUBCUTANEOUS at 08:34

## 2017-01-01 RX ADMIN — ESCITALOPRAM OXALATE 10 MG: 10 TABLET ORAL at 08:31

## 2017-01-01 RX ADMIN — HYDROCODONE BITARTRATE AND ACETAMINOPHEN 1 TABLET: 325; 10 TABLET ORAL at 14:01

## 2017-01-01 RX ADMIN — OXYBUTYNIN CHLORIDE 5 MG: 5 TABLET, EXTENDED RELEASE ORAL at 11:46

## 2017-01-01 RX ADMIN — BUPROPION HYDROCHLORIDE 150 MG: 150 TABLET, EXTENDED RELEASE ORAL at 10:01

## 2017-01-01 RX ADMIN — FUROSEMIDE 40 MG: 10 INJECTION, SOLUTION INTRAMUSCULAR; INTRAVENOUS at 18:45

## 2017-01-01 RX ADMIN — OXYBUTYNIN CHLORIDE 5 MG: 5 TABLET, EXTENDED RELEASE ORAL at 08:36

## 2017-01-01 RX ADMIN — Medication 10 ML: at 05:55

## 2017-01-01 RX ADMIN — BUPROPION HYDROCHLORIDE 150 MG: 150 TABLET, EXTENDED RELEASE ORAL at 09:51

## 2017-01-01 RX ADMIN — Medication 2 MG: at 03:15

## 2017-01-01 RX ADMIN — FENTANYL CITRATE 100 MCG: 50 INJECTION, SOLUTION INTRAMUSCULAR; INTRAVENOUS at 15:16

## 2017-01-01 RX ADMIN — OXYCODONE HYDROCHLORIDE AND ACETAMINOPHEN 1 TABLET: 10; 325 TABLET ORAL at 06:39

## 2017-01-01 RX ADMIN — Medication: at 08:30

## 2017-01-01 RX ADMIN — HYDROCODONE BITARTRATE AND ACETAMINOPHEN 1 TABLET: 325; 10 TABLET ORAL at 22:59

## 2017-01-01 RX ADMIN — HYDROCODONE BITARTRATE AND ACETAMINOPHEN 1 TABLET: 325; 10 TABLET ORAL at 21:46

## 2017-01-01 RX ADMIN — Medication: at 09:00

## 2017-01-01 RX ADMIN — BUPROPION HYDROCHLORIDE 150 MG: 150 TABLET, EXTENDED RELEASE ORAL at 21:47

## 2017-01-01 RX ADMIN — RIVAROXABAN 20 MG: 20 TABLET, FILM COATED ORAL at 12:43

## 2017-01-01 RX ADMIN — KETOROLAC TROMETHAMINE 15 MG: 30 INJECTION, SOLUTION INTRAMUSCULAR at 01:26

## 2017-01-01 RX ADMIN — OXYCODONE HYDROCHLORIDE AND ACETAMINOPHEN 1 TABLET: 10; 325 TABLET ORAL at 02:01

## 2017-01-01 RX ADMIN — FAMOTIDINE 20 MG: 20 TABLET ORAL at 08:31

## 2017-01-01 RX ADMIN — HYDROCODONE BITARTRATE AND ACETAMINOPHEN 1 TABLET: 325; 10 TABLET ORAL at 08:30

## 2017-01-01 RX ADMIN — BUPROPION HYDROCHLORIDE 150 MG: 150 TABLET, EXTENDED RELEASE ORAL at 21:09

## 2017-01-01 RX ADMIN — Medication 2 MG: at 00:25

## 2017-01-01 RX ADMIN — HYDROCODONE BITARTRATE AND ACETAMINOPHEN 1 TABLET: 325; 10 TABLET ORAL at 00:46

## 2017-01-01 RX ADMIN — KETOROLAC TROMETHAMINE 15 MG: 30 INJECTION, SOLUTION INTRAMUSCULAR; INTRAVENOUS at 01:26

## 2017-01-01 RX ADMIN — RIVAROXABAN 20 MG: 20 TABLET, FILM COATED ORAL at 09:26

## 2017-01-01 RX ADMIN — HYDROMORPHONE HYDROCHLORIDE 1 MG: 1 INJECTION, SOLUTION INTRAMUSCULAR; INTRAVENOUS; SUBCUTANEOUS at 03:13

## 2017-01-01 RX ADMIN — POTASSIUM & SODIUM PHOSPHATES POWDER PACK 280-160-250 MG 1 PACKET: 280-160-250 PACK at 09:59

## 2017-01-01 RX ADMIN — SIMVASTATIN 40 MG: 40 TABLET, FILM COATED ORAL at 21:49

## 2017-01-01 RX ADMIN — RIVAROXABAN 20 MG: 20 TABLET, FILM COATED ORAL at 09:22

## 2017-01-01 RX ADMIN — Medication: at 16:43

## 2017-01-01 RX ADMIN — PANTOPRAZOLE SODIUM 40 MG: 40 TABLET, DELAYED RELEASE ORAL at 06:46

## 2017-01-01 RX ADMIN — DOCUSATE SODIUM AND SENNOSIDES 2 TABLET: 8.6; 5 TABLET, FILM COATED ORAL at 09:00

## 2017-01-01 RX ADMIN — HYDROCODONE BITARTRATE AND ACETAMINOPHEN 1 TABLET: 10; 325 TABLET ORAL at 09:22

## 2017-01-01 RX ADMIN — RIVAROXABAN 20 MG: 20 TABLET, FILM COATED ORAL at 08:54

## 2017-01-01 RX ADMIN — BUPROPION HYDROCHLORIDE 150 MG: 150 TABLET, EXTENDED RELEASE ORAL at 09:07

## 2017-01-01 RX ADMIN — OXYBUTYNIN CHLORIDE 5 MG: 5 TABLET, EXTENDED RELEASE ORAL at 09:35

## 2017-01-01 RX ADMIN — HYDROMORPHONE HYDROCHLORIDE 1.5 MG: 2 INJECTION, SOLUTION INTRAMUSCULAR; INTRAVENOUS; SUBCUTANEOUS at 11:17

## 2017-01-01 RX ADMIN — Medication 10 ML: at 23:36

## 2017-01-01 RX ADMIN — SODIUM CHLORIDE 125 ML/HR: 900 INJECTION, SOLUTION INTRAVENOUS at 03:45

## 2017-01-01 RX ADMIN — Medication 10 ML: at 21:40

## 2017-01-01 RX ADMIN — Medication 10 ML: at 12:01

## 2017-01-01 RX ADMIN — HYDROMORPHONE HYDROCHLORIDE 1 MG: 1 INJECTION, SOLUTION INTRAMUSCULAR; INTRAVENOUS; SUBCUTANEOUS at 15:57

## 2017-01-01 RX ADMIN — PANTOPRAZOLE SODIUM 40 MG: 40 TABLET, DELAYED RELEASE ORAL at 07:15

## 2017-01-01 RX ADMIN — SODIUM CHLORIDE 125 ML/HR: 900 INJECTION, SOLUTION INTRAVENOUS at 20:01

## 2017-01-01 RX ADMIN — PANTOPRAZOLE SODIUM 40 MG: 40 TABLET, DELAYED RELEASE ORAL at 07:11

## 2017-01-01 RX ADMIN — HYDROCODONE BITARTRATE AND ACETAMINOPHEN 1 TABLET: 10; 325 TABLET ORAL at 11:11

## 2017-01-01 RX ADMIN — HYDROMORPHONE HYDROCHLORIDE 1 MG: 1 INJECTION, SOLUTION INTRAMUSCULAR; INTRAVENOUS; SUBCUTANEOUS at 05:14

## 2017-01-01 RX ADMIN — BACLOFEN 5 MG: 10 TABLET ORAL at 21:54

## 2017-01-01 RX ADMIN — CALCITONIN SALMON 100 INT'L UNITS: 200 INJECTION, SOLUTION INTRAMUSCULAR; SUBCUTANEOUS at 22:39

## 2017-01-01 RX ADMIN — BUPROPION HYDROCHLORIDE 300 MG: 150 TABLET, FILM COATED, EXTENDED RELEASE ORAL at 08:31

## 2017-01-01 RX ADMIN — SENNOSIDES 17.2 MG: 8.6 TABLET, FILM COATED ORAL at 09:28

## 2017-01-01 RX ADMIN — Medication: at 13:51

## 2017-01-01 RX ADMIN — METOPROLOL SUCCINATE 25 MG: 25 TABLET, EXTENDED RELEASE ORAL at 09:51

## 2017-01-01 RX ADMIN — BUPROPION HYDROCHLORIDE 300 MG: 150 TABLET, FILM COATED, EXTENDED RELEASE ORAL at 10:02

## 2017-01-01 RX ADMIN — POTASSIUM CHLORIDE 40 MEQ: 750 TABLET, FILM COATED, EXTENDED RELEASE ORAL at 19:27

## 2017-01-01 RX ADMIN — HYDROCODONE BITARTRATE AND ACETAMINOPHEN 1 TABLET: 10; 325 TABLET ORAL at 14:46

## 2017-01-01 RX ADMIN — PANTOPRAZOLE SODIUM 40 MG: 40 TABLET, DELAYED RELEASE ORAL at 13:18

## 2017-01-01 RX ADMIN — SODIUM CHLORIDE, POTASSIUM CHLORIDE, SODIUM LACTATE AND CALCIUM CHLORIDE: 600; 310; 30; 20 INJECTION, SOLUTION INTRAVENOUS at 16:00

## 2017-01-01 RX ADMIN — HYDROMORPHONE HYDROCHLORIDE 1 MG: 1 INJECTION, SOLUTION INTRAMUSCULAR; INTRAVENOUS; SUBCUTANEOUS at 12:03

## 2017-01-01 RX ADMIN — Medication: at 05:22

## 2017-01-01 RX ADMIN — HYDROCODONE BITARTRATE AND ACETAMINOPHEN 1 TABLET: 10; 325 TABLET ORAL at 18:30

## 2017-01-01 RX ADMIN — HYDROCODONE BITARTRATE AND ACETAMINOPHEN 1 TABLET: 325; 10 TABLET ORAL at 00:28

## 2017-01-01 RX ADMIN — POTASSIUM CHLORIDE 40 MEQ: 750 TABLET, FILM COATED, EXTENDED RELEASE ORAL at 08:25

## 2017-01-01 RX ADMIN — IOPAMIDOL 100 ML: 755 INJECTION, SOLUTION INTRAVENOUS at 12:05

## 2017-01-01 RX ADMIN — Medication 5 MG: at 16:56

## 2017-01-01 RX ADMIN — POLYETHYLENE GLYCOL 3350 17 G: 17 POWDER, FOR SOLUTION ORAL at 09:51

## 2017-01-01 RX ADMIN — POTASSIUM CHLORIDE 10 MEQ: 200 INJECTION, SOLUTION INTRAVENOUS at 04:45

## 2017-01-01 RX ADMIN — Medication: at 08:27

## 2017-01-01 RX ADMIN — HYDROMORPHONE HYDROCHLORIDE 1.5 MG: 2 INJECTION, SOLUTION INTRAMUSCULAR; INTRAVENOUS; SUBCUTANEOUS at 05:35

## 2017-01-01 RX ADMIN — OXYCODONE HYDROCHLORIDE AND ACETAMINOPHEN 1 TABLET: 10; 325 TABLET ORAL at 21:17

## 2017-01-01 RX ADMIN — SODIUM CHLORIDE 25 ML/HR: 900 INJECTION, SOLUTION INTRAVENOUS at 06:18

## 2017-01-01 RX ADMIN — POTASSIUM & SODIUM PHOSPHATES POWDER PACK 280-160-250 MG 1 PACKET: 280-160-250 PACK at 23:38

## 2017-01-01 RX ADMIN — Medication 2 MG: at 23:11

## 2017-01-01 RX ADMIN — Medication 10 ML: at 16:34

## 2017-01-01 RX ADMIN — Medication 10 ML: at 14:11

## 2017-01-01 RX ADMIN — BACLOFEN 5 MG: 10 TABLET ORAL at 21:45

## 2017-01-01 RX ADMIN — POTASSIUM CHLORIDE 40 MEQ: 750 TABLET, FILM COATED, EXTENDED RELEASE ORAL at 17:27

## 2017-01-01 RX ADMIN — FENTANYL CITRATE 25 MCG: 50 INJECTION, SOLUTION INTRAMUSCULAR; INTRAVENOUS at 07:38

## 2017-01-01 RX ADMIN — HYDROMORPHONE HYDROCHLORIDE 0.5 MG: 1 INJECTION, SOLUTION INTRAMUSCULAR; INTRAVENOUS; SUBCUTANEOUS at 06:03

## 2017-01-01 RX ADMIN — PANTOPRAZOLE SODIUM 40 MG: 40 TABLET, DELAYED RELEASE ORAL at 07:06

## 2017-01-01 RX ADMIN — HYDROCODONE BITARTRATE AND ACETAMINOPHEN 1 TABLET: 325; 10 TABLET ORAL at 03:14

## 2017-01-01 RX ADMIN — HYDROMORPHONE HYDROCHLORIDE 1.5 MG: 2 INJECTION, SOLUTION INTRAMUSCULAR; INTRAVENOUS; SUBCUTANEOUS at 21:47

## 2017-01-01 RX ADMIN — LORAZEPAM 1 MG: 1 TABLET ORAL at 01:00

## 2017-01-01 RX ADMIN — HYDROCODONE BITARTRATE AND ACETAMINOPHEN 1 TABLET: 325; 10 TABLET ORAL at 08:17

## 2017-01-01 RX ADMIN — SODIUM CHLORIDE 125 ML/HR: 900 INJECTION, SOLUTION INTRAVENOUS at 04:11

## 2017-01-01 RX ADMIN — PRAVASTATIN SODIUM 40 MG: 40 TABLET ORAL at 22:53

## 2017-01-01 RX ADMIN — FAMOTIDINE 20 MG: 20 TABLET ORAL at 09:26

## 2017-01-01 RX ADMIN — HYDROCODONE BITARTRATE AND ACETAMINOPHEN 1 TABLET: 10; 325 TABLET ORAL at 10:49

## 2017-01-01 RX ADMIN — Medication 5 ML: at 22:00

## 2017-01-01 RX ADMIN — POTASSIUM CHLORIDE 10 MEQ: 200 INJECTION, SOLUTION INTRAVENOUS at 05:55

## 2017-01-01 RX ADMIN — SODIUM CHLORIDE, POTASSIUM CHLORIDE, SODIUM LACTATE AND CALCIUM CHLORIDE: 600; 310; 30; 20 INJECTION, SOLUTION INTRAVENOUS at 16:45

## 2017-01-01 RX ADMIN — BUPROPION HYDROCHLORIDE 150 MG: 150 TABLET, EXTENDED RELEASE ORAL at 20:59

## 2017-01-01 RX ADMIN — BUPROPION HYDROCHLORIDE 300 MG: 150 TABLET, FILM COATED, EXTENDED RELEASE ORAL at 07:01

## 2017-01-01 RX ADMIN — Medication 10 ML: at 05:38

## 2017-01-01 RX ADMIN — BUPROPION HYDROCHLORIDE 150 MG: 150 TABLET, EXTENDED RELEASE ORAL at 21:25

## 2017-01-01 RX ADMIN — POTASSIUM CHLORIDE 10 MEQ: 750 TABLET, FILM COATED, EXTENDED RELEASE ORAL at 15:48

## 2017-01-01 RX ADMIN — IOHEXOL 50 ML: 240 INJECTION, SOLUTION INTRATHECAL; INTRAVASCULAR; INTRAVENOUS; ORAL at 08:00

## 2017-01-01 RX ADMIN — Medication 10 ML: at 21:28

## 2017-01-01 RX ADMIN — Medication 10 ML: at 05:33

## 2017-01-01 RX ADMIN — BUPROPION HYDROCHLORIDE 300 MG: 150 TABLET, FILM COATED, EXTENDED RELEASE ORAL at 06:45

## 2017-01-01 RX ADMIN — DEXAMETHASONE SODIUM PHOSPHATE 8 MG: 4 INJECTION, SOLUTION INTRA-ARTICULAR; INTRALESIONAL; INTRAMUSCULAR; INTRAVENOUS; SOFT TISSUE at 08:03

## 2017-01-01 RX ADMIN — POTASSIUM CHLORIDE 40 MEQ: 20 SOLUTION ORAL at 06:55

## 2017-01-01 RX ADMIN — Medication 10 ML: at 13:24

## 2017-01-01 RX ADMIN — Medication 10 ML: at 10:06

## 2017-01-01 RX ADMIN — OXYBUTYNIN CHLORIDE 5 MG: 5 TABLET, EXTENDED RELEASE ORAL at 09:10

## 2017-01-01 RX ADMIN — OXYBUTYNIN CHLORIDE 5 MG: 5 TABLET, EXTENDED RELEASE ORAL at 09:24

## 2017-01-01 RX ADMIN — Medication: at 20:03

## 2017-01-01 RX ADMIN — Medication: at 00:45

## 2017-01-01 RX ADMIN — FENTANYL CITRATE 100 MCG: 50 INJECTION, SOLUTION INTRAMUSCULAR; INTRAVENOUS at 16:34

## 2017-01-01 RX ADMIN — FAMOTIDINE 20 MG: 20 TABLET ORAL at 08:25

## 2017-01-01 RX ADMIN — ESCITALOPRAM OXALATE 10 MG: 10 TABLET ORAL at 10:02

## 2017-01-01 RX ADMIN — ESCITALOPRAM OXALATE 10 MG: 10 TABLET ORAL at 10:40

## 2017-01-01 RX ADMIN — OXYCODONE HYDROCHLORIDE AND ACETAMINOPHEN 1 TABLET: 10; 325 TABLET ORAL at 00:11

## 2017-01-01 RX ADMIN — BUPROPION HYDROCHLORIDE 300 MG: 150 TABLET, FILM COATED, EXTENDED RELEASE ORAL at 06:38

## 2017-01-01 RX ADMIN — HYDROCODONE BITARTRATE AND ACETAMINOPHEN 1 TABLET: 325; 10 TABLET ORAL at 17:31

## 2017-01-01 RX ADMIN — SODIUM CHLORIDE 125 ML/HR: 900 INJECTION, SOLUTION INTRAVENOUS at 17:10

## 2017-01-01 RX ADMIN — POTASSIUM CHLORIDE 40 MEQ: 750 TABLET, FILM COATED, EXTENDED RELEASE ORAL at 10:00

## 2017-01-01 RX ADMIN — SODIUM CHLORIDE 150 ML/HR: 900 INJECTION, SOLUTION INTRAVENOUS at 20:54

## 2017-01-01 RX ADMIN — Medication 10 ML: at 21:48

## 2017-01-01 RX ADMIN — Medication 10 ML: at 16:35

## 2017-01-01 RX ADMIN — HYDROMORPHONE HYDROCHLORIDE 1 MG: 1 INJECTION, SOLUTION INTRAMUSCULAR; INTRAVENOUS; SUBCUTANEOUS at 20:25

## 2017-01-01 RX ADMIN — HYDROCODONE BITARTRATE AND ACETAMINOPHEN 1 TABLET: 10; 325 TABLET ORAL at 08:31

## 2017-01-01 RX ADMIN — HYDROCODONE BITARTRATE AND ACETAMINOPHEN 1 TABLET: 325; 10 TABLET ORAL at 20:52

## 2017-01-01 RX ADMIN — HYDROMORPHONE HYDROCHLORIDE 0.5 MG: 1 INJECTION, SOLUTION INTRAMUSCULAR; INTRAVENOUS; SUBCUTANEOUS at 03:04

## 2017-01-01 RX ADMIN — OXYBUTYNIN CHLORIDE 5 MG: 5 TABLET, FILM COATED, EXTENDED RELEASE ORAL at 09:22

## 2017-01-01 RX ADMIN — OXYBUTYNIN CHLORIDE 5 MG: 5 TABLET, FILM COATED, EXTENDED RELEASE ORAL at 10:40

## 2017-01-01 RX ADMIN — BACLOFEN 5 MG: 10 TABLET ORAL at 08:32

## 2017-01-01 RX ADMIN — HYDROCODONE BITARTRATE AND ACETAMINOPHEN 1 TABLET: 10; 325 TABLET ORAL at 04:09

## 2017-01-01 RX ADMIN — Medication 10 ML: at 07:15

## 2017-01-01 RX ADMIN — POLYETHYLENE GLYCOL 3350 17 G: 17 POWDER, FOR SOLUTION ORAL at 22:24

## 2017-01-01 RX ADMIN — SODIUM CHLORIDE, SODIUM LACTATE, POTASSIUM CHLORIDE, AND CALCIUM CHLORIDE 125 ML/HR: 600; 310; 30; 20 INJECTION, SOLUTION INTRAVENOUS at 07:25

## 2017-01-01 RX ADMIN — SENNOSIDES 17.2 MG: 8.6 TABLET, FILM COATED ORAL at 09:03

## 2017-01-01 RX ADMIN — FAMOTIDINE 20 MG: 20 TABLET ORAL at 09:47

## 2017-01-01 RX ADMIN — HYDROCODONE BITARTRATE AND ACETAMINOPHEN 1 TABLET: 325; 10 TABLET ORAL at 21:25

## 2017-01-01 RX ADMIN — Medication 10 ML: at 15:19

## 2017-01-01 RX ADMIN — MIDAZOLAM HYDROCHLORIDE 2 MG: 1 INJECTION, SOLUTION INTRAMUSCULAR; INTRAVENOUS at 16:27

## 2017-01-01 RX ADMIN — SODIUM CHLORIDE 1000 ML: 900 INJECTION, SOLUTION INTRAVENOUS at 04:13

## 2017-01-01 RX ADMIN — MAGNESIUM SULFATE HEPTAHYDRATE 2 G: 40 INJECTION, SOLUTION INTRAVENOUS at 08:33

## 2017-01-01 RX ADMIN — SODIUM CHLORIDE 100 ML: 900 INJECTION, SOLUTION INTRAVENOUS at 17:50

## 2017-01-01 RX ADMIN — ACETAMINOPHEN 1000 MG: 10 INJECTION, SOLUTION INTRAVENOUS at 19:34

## 2017-01-01 RX ADMIN — Medication 10 ML: at 09:25

## 2017-01-01 RX ADMIN — HYDROMORPHONE HYDROCHLORIDE 1 MG: 1 INJECTION, SOLUTION INTRAMUSCULAR; INTRAVENOUS; SUBCUTANEOUS at 10:02

## 2017-01-01 RX ADMIN — Medication 10 ML: at 06:55

## 2017-01-01 RX ADMIN — Medication 10 ML: at 21:55

## 2017-01-01 RX ADMIN — SODIUM CHLORIDE 125 ML/HR: 900 INJECTION, SOLUTION INTRAVENOUS at 00:56

## 2017-01-01 RX ADMIN — POTASSIUM CHLORIDE 40 MEQ: 750 TABLET, FILM COATED, EXTENDED RELEASE ORAL at 07:21

## 2017-01-01 RX ADMIN — HYDROCODONE BITARTRATE AND ACETAMINOPHEN 1 TABLET: 325; 10 TABLET ORAL at 08:28

## 2017-01-01 RX ADMIN — HYDROCODONE BITARTRATE AND ACETAMINOPHEN 1 TABLET: 10; 325 TABLET ORAL at 13:22

## 2017-01-01 RX ADMIN — BUPROPION HYDROCHLORIDE 150 MG: 150 TABLET, EXTENDED RELEASE ORAL at 22:23

## 2017-01-01 RX ADMIN — HYDROMORPHONE HYDROCHLORIDE 1.5 MG: 2 INJECTION, SOLUTION INTRAMUSCULAR; INTRAVENOUS; SUBCUTANEOUS at 01:30

## 2017-01-01 RX ADMIN — SODIUM CHLORIDE, SODIUM LACTATE, POTASSIUM CHLORIDE, CALCIUM CHLORIDE: 600; 310; 30; 20 INJECTION, SOLUTION INTRAVENOUS at 07:01

## 2017-01-01 RX ADMIN — OXYBUTYNIN CHLORIDE 5 MG: 5 TABLET, EXTENDED RELEASE ORAL at 08:25

## 2017-01-01 RX ADMIN — FAMOTIDINE 20 MG: 20 TABLET ORAL at 17:00

## 2017-01-01 RX ADMIN — Medication: at 20:24

## 2017-01-01 RX ADMIN — POTASSIUM & SODIUM PHOSPHATES POWDER PACK 280-160-250 MG 1 PACKET: 280-160-250 PACK at 14:01

## 2017-01-01 RX ADMIN — ZOLEDRONIC ACID 100 ML: 0.04 INJECTION, SOLUTION INTRAVENOUS at 06:00

## 2017-01-01 RX ADMIN — LORAZEPAM 1 MG: 1 TABLET ORAL at 05:37

## 2017-01-01 RX ADMIN — POTASSIUM CHLORIDE 10 MEQ: 750 TABLET, FILM COATED, EXTENDED RELEASE ORAL at 09:24

## 2017-01-01 RX ADMIN — SODIUM CHLORIDE 240 MG: 900 INJECTION, SOLUTION INTRAVENOUS at 15:15

## 2017-01-01 RX ADMIN — BACLOFEN 5 MG: 10 TABLET ORAL at 21:17

## 2017-01-01 RX ADMIN — RIVAROXABAN 20 MG: 20 TABLET, FILM COATED ORAL at 08:25

## 2017-01-01 RX ADMIN — SODIUM CHLORIDE 1000 ML: 900 INJECTION, SOLUTION INTRAVENOUS at 05:05

## 2017-01-01 RX ADMIN — BUPROPION HYDROCHLORIDE 150 MG: 150 TABLET, EXTENDED RELEASE ORAL at 21:14

## 2017-01-01 RX ADMIN — Medication 10 ML: at 07:42

## 2017-01-01 RX ADMIN — RIVAROXABAN 20 MG: 20 TABLET, FILM COATED ORAL at 10:01

## 2017-01-01 RX ADMIN — ONDANSETRON 4 MG: 2 INJECTION INTRAMUSCULAR; INTRAVENOUS at 08:19

## 2017-01-01 RX ADMIN — IPRATROPIUM BROMIDE AND ALBUTEROL SULFATE 3 ML: .5; 3 SOLUTION RESPIRATORY (INHALATION) at 01:20

## 2017-01-01 RX ADMIN — POTASSIUM & SODIUM PHOSPHATES POWDER PACK 280-160-250 MG 1 PACKET: 280-160-250 PACK at 08:17

## 2017-01-01 RX ADMIN — BACLOFEN 5 MG: 10 TABLET ORAL at 19:00

## 2017-01-01 RX ADMIN — Medication 10 ML: at 05:34

## 2017-01-01 RX ADMIN — Medication: at 16:35

## 2017-01-01 RX ADMIN — HYDROCODONE BITARTRATE AND ACETAMINOPHEN 1 TABLET: 325; 10 TABLET ORAL at 11:28

## 2017-01-01 RX ADMIN — RIVAROXABAN 20 MG: 20 TABLET, FILM COATED ORAL at 12:03

## 2017-01-01 RX ADMIN — Medication 10 ML: at 15:36

## 2017-01-01 RX ADMIN — Medication 10 ML: at 21:24

## 2017-01-01 RX ADMIN — ESCITALOPRAM OXALATE 10 MG: 10 TABLET ORAL at 09:29

## 2017-01-01 RX ADMIN — ESCITALOPRAM OXALATE 10 MG: 10 TABLET ORAL at 09:10

## 2017-01-01 RX ADMIN — OXYBUTYNIN CHLORIDE 5 MG: 5 TABLET, EXTENDED RELEASE ORAL at 08:17

## 2017-01-01 RX ADMIN — FAMOTIDINE 20 MG: 20 TABLET ORAL at 12:25

## 2017-01-01 RX ADMIN — BUPROPION HYDROCHLORIDE 300 MG: 150 TABLET, FILM COATED, EXTENDED RELEASE ORAL at 10:07

## 2017-01-01 RX ADMIN — POTASSIUM & SODIUM PHOSPHATES POWDER PACK 280-160-250 MG 1 PACKET: 280-160-250 PACK at 19:27

## 2017-01-01 RX ADMIN — HYDROCODONE BITARTRATE AND ACETAMINOPHEN 1 TABLET: 325; 10 TABLET ORAL at 12:39

## 2017-01-01 RX ADMIN — Medication 10 ML: at 14:45

## 2017-01-03 NOTE — TELEPHONE ENCOUNTER
Contacted patient for further detail      She states that she found out that she doesn't need a referral. Advised patient to call office back if there was anything further our office could hep with

## 2017-01-03 NOTE — TELEPHONE ENCOUNTER
Carly Varma 1960     Pt called stating that she would like a second opinion  on a cancer diagnosed and needs a referral faxed to [phone].  Pt states that she will try to call back with a fax number

## 2017-01-10 NOTE — TELEPHONE ENCOUNTER
Radha Varma 1960 Phone:[phone]      Pt called stating that she has been in [facility] for a week trying to find a diagnosis for her extreme pain. Pt states that today they have found the diagnosis and they advised her to have surgery.  Pt states that she would like to speak with Dr. Jong Quinn in regards to her recent cancer diagnosis, the advised surgery, and the surgeon assigned to her

## 2017-03-08 NOTE — PROGRESS NOTES
This note will not be viewable in [address] Nurse Navigator completed 30 day post hospitalization episode dated 2017. Retrieved hospital notes from Critical access hospital records portal and forwarded to BSD for review. Will review for case management post recent hospitalization (3/2/17)Patient has had three hospitalizations stemming from RLL stage 1B sarcomatoid carcinoma diagnosis 2016 which involved a s/p right lower lobectomy (Dr. Jose J Hollingsworth  Second admission for appendectomy, 2017. Third admission 17 - 3/2/17  Hospital episode opened. Transition Of Care Note    NNTOCIP     Date/Time:  3/8/2017 2:47 PM    Patient listed on discharge SEE [facility]) report on 3/7/17. Patient discharged from [address] [place] [address] for pleural effusion and PE. RRAT score: none listed     Medical History:     Past Medical History:   Diagnosis Date    Cancer ([address]     [facility]    Depression     History of screening mammography 3/27/2012    Intention tremor     Pap smear for cervical cancer screening 3/27/2012    Stress incontinence     Tobacco abuse      Nurse Navigator(NN) contacted the patient by telephone to perform post hospital discharge assessment. Verified  and address with patient as identifiers. Provided introduction to self, and explanation of the Nurses Navigator role. Hospital course:  Patient was sent from [facility] after chest CT completed. Patient diagnosed with right sided pleural effusion and PE. Ms. Jose D Bah received her first chemotherapy (carboplatin/permetrexed) on 2/15/17 (Dr. Hernando Godinez, [facility]) and started experiencing increased SOB when walking. A few days prior to admission patient was only able to walk short distances. At Dr. Martínez Degree office O2 was 85% on RA. Chest CT ordered which revealed a large right pleural effusion and PE. Patient was given lovenox and told to report to hospital to be admitted. Next morning patient with worsening SOB.  Patient was started on xarelto the day before. During this admission and US guided pleural drain was inserted. Fluid cultured and the cytology was negative; \"still concerned for malignancy\" 1.6 liters  Drain was removed 3/2/17 due to no significant fluid. Discharged home with 6 week f/u with Dr. Jessica Mccollum patient today. Patient has returned to work. Experiencing some fatigue from working a full day yesterday. Cough returned this morning. Patient has called Dr. Harry Dukes regarding this issue since she had the same type of cough prior to admission. Diet:   Patient reports: Regular Diet    Activity:    Patient reports: working full time [employer]    Medication:   Performed medication reconciliation with patient, and patient verbalizes understanding of administration of home medications. There were no barriers to obtaining medications identified at this time. Tolerating xarelto. Per BSD patient to stop hormone therapy. Patient acknowledged she stopped during admission and has not resumed. Support system:  patient and friend    Discharge Instructions :  Reviewed discharge instructions with patient. Patient verbalizes understanding of discharge instructions and follow-up care. Red Flags:  Cough, chest pain, shortness of breath or increased LEE, fever. Labs Reviewed:  3/2/17:  WBC 3.0;  RBC  3.29; Hbg 10.2; Hct  30.5    Imaging results reviewed:  Chest CT - large right pleural effusion and PE    Advance Care Planning:   Patient was offered the opportunity to discuss advance care planning:  no     Does patient have an Advance Directive:  unknown   If no, did you provide information on Caring Connections?  no     PCP/Specialist follow up: Patient scheduled to follow up with Leroy Leblanc MD offered but patient declined at this time. Reviewed red flags with patient, and patient verbalizes understanding. Patient given an opportunity to ask questions. No other clinical/social/functional needs noted.    The patient agrees to contact the PCP office for questions related to their healthcare. The patient expressed thanks, offered no additional questions and ended the call. Plan:  Rest periodically. Don't over exert. Attend f/u with Dr. John Genao. Call Dr. Pizarro  office if no return call by 3/9/17 a.m. Call Dr. Jess Jason if wants to be scheduled. Patient has NN contact information if needed.

## 2017-03-08 NOTE — PROGRESS NOTES
Patient will resume chemotherapy (allenta cisplatin) on 3/13/17 at [address] [place] [address]. This will be her second treatment of four scheduled therapies.

## 2017-03-21 PROBLEM — C34.91 MALIGNANT NEOPLASM OF RIGHT LUNG (HCC): Status: ACTIVE | Noted: 2017-01-01

## 2017-03-21 NOTE — PROGRESS NOTES
Chief Complaint   Patient presents with    LOW BACK PAIN     Pt went to urgent care last Wednesday (March 15) and was prescribed Medrol dose pack for lower back and R leg pain. Pt notes the pain is not better and worse this morning.

## 2017-03-21 NOTE — PROGRESS NOTES
Subjective:      Rona Abad is a 61 y.o. female who presents today with low back pain. She went to [facility] last week, [id number]. They gave her medrol dose pack , which she has completed and her back still is quite painful. She is walking with a crutch because of the pain that radiates to her right buttock and leg. She is currently being treated for lung cancer. She had a xray done at the time of her urgent care visit that did not show any fractures or lytic lesions. The pain did improve on the first few days of the medrol pack. Patient Active Problem List   Diagnosis Code    Depression F32.9    Stress incontinence N39.3    Basal cell cancer C44.91    Pap smear for cervical cancer screening Z12.4    History of screening mammography Z92.89    Colon cancer screening Z12.11    Hyperlipidemia E78.5    S/P lobectomy of lung Z90.2    Malignant neoplasm of right lung (HCC) C34.91     Current Outpatient Prescriptions   Medication Sig Dispense Refill    predniSONE (DELTASONE) 10 mg tablet 4 tabs daily for 2 days, 3 tablets for 2 days, 2 tablets for 2 days, 1 tablet for 2 days, then 1/2 tab for 2 days 21 Tab 0    cyclobenzaprine (FLEXERIL) 10 mg tablet Take 1 Tab by mouth nightly. 10 Tab 0    rivaroxaban (XARELTO) 15 mg (42)- 20 mg (9) DsPk Take  by mouth. Take with food, at approximately the same time each day.  Cetirizine (ZYRTEC) 10 mg cap Take  by mouth.  escitalopram oxalate (LEXAPRO) 10 mg tablet Take 10 mg by mouth daily. 0    simvastatin (ZOCOR) 40 mg tablet TAKE 1 TAB BY MOUTH NIGHTLY. 90 Tab 1    buPROPion XL (WELLBUTRIN XL) 300 mg XL tablet Take 1 Tab by mouth every morning. 90 Tab 0    solifenacin (VESICARE) 5 mg tablet Take 5 mg by mouth daily. Review of Systems    Pertinent items are noted in HPI.      Objective:     Visit Vitals    /76 (BP 1 Location: Right arm, BP Patient Position: Sitting)    Pulse 75    Temp 98.3 °F (36.8 °C) (Oral)    Resp 17  Ht 5' 3\" (1.6 m)    Wt 171 lb 12.8 oz (77.9 kg)    SpO2 98%    BMI 30.43 kg/m2     General appearance: alert, cooperative, no distress, appears stated age  Head: Normocephalic, without obvious abnormality, atraumatic    Assessment/Plan:     1. Lumbar strain, initial encounter  -with some improvement with the higher doses of steroid. Will repeat her steroid course, but taper slower.  -also add flexeril nightly   -ice lower back for 20 minutes followed by heat for 20 minutes. Orders Placed This Encounter    predniSONE (DELTASONE) 10 mg tablet     Si tabs daily for 2 days, 3 tablets for 2 days, 2 tablets for 2 days, 1 tablet for 2 days, then 1/2 tab for 2 days     Dispense:  21 Tab     Refill:  0    cyclobenzaprine (FLEXERIL) 10 mg tablet     Sig: Take 1 Tab by mouth nightly. Dispense:  10 Tab     Refill:  0         Follow-up Disposition:     If not improvement in the next week, follow up. Return if symptoms worsen or fail to improve. Advised patient to call back or return to office if symptoms worsen/change/persist.     Discussed expected course/resolution/complications of diagnosis in detail with patient. Medication risks/benefits/costs/interactions/alternatives discussed with patient. Patient was given an after visit summary which includes diagnoses, current medications, & vitals. Patient expressed understanding with the diagnosis and plan.

## 2017-03-21 NOTE — PROGRESS NOTES
Retrieved hospital notes/results from Critical access hospital records portal and forwarded to BSD for review. Patient seen in the office today with BSD.

## 2017-03-21 NOTE — LETTER
NOTIFICATION RETURN TO WORK / SCHOOL 
 
3/21/2017 10:25 AM 
 
[facility] [address] [facility] [address] [id number] To Whom It May Concern: 
 
Boris Wilder is currently under the care of Arturo Kenney. She will return to work/school on: Monday, March 27, 2017. If there are questions or concerns please have the patient contact our office. Sincerely, Rojelio Hanson MD

## 2017-03-21 NOTE — MR AVS SNAPSHOT
Visit Information Date & Time Provider Department Dept. Phone Encounter #  
 3/21/2017  9:40 AM Isaac Ellis MD [place] [id number] [id number] Follow-up Instructions Return if symptoms worsen or fail to improve. Upcoming Health Maintenance Date Due Pneumococcal 19-64 Highest Risk (1 of 3 - PCV13) 4/12/1978 DTaP/Tdap/Td series (1 - Tdap) 1/2/2009 INFLUENZA AGE 9 TO ADULT 8/1/2016 PAP AKA CERVICAL CYTOLOGY 9/1/2016 BREAST CANCER SCRN MAMMOGRAM 1/26/2019 COLONOSCOPY 1/9/2027 Allergies as of 3/21/2017  Review Complete On: 3/21/2017 By: Allan Mehta LPN Severity Noted Reaction Type Reactions Tetracycline Medium 09/10/2009   Side Effect Hives Palms and soles Lortab [Hydrocodone-acetaminophen]  09/23/2014    Itching Current Immunizations  Reviewed on 11/11/2013 Name Date Influenza Vaccine 10/20/2014, 11/11/2013 11:19 AM  
 TD Vaccine 1/1/2009 Not reviewed this visit You Were Diagnosed With   
  
 Codes Comments Lumbar strain, initial encounter    -  Primary ICD-10-CM: Y95.594W ICD-9-CM: 294. 2 Vitals BP Pulse Temp Resp Height(growth percentile) Weight(growth percentile) 118/76 (BP 1 Location: Right arm, BP Patient Position: Sitting) 75 98.3 °F (36.8 °C) (Oral) 17 5' 3\" (1.6 m) 171 lb 12.8 oz (77.9 kg) SpO2 BMI OB Status Smoking Status 98% 30.43 kg/m2 Postmenopausal Former Smoker Vitals History BMI and BSA Data Body Mass Index Body Surface Area  
 30.43 kg/m 2 1.86 m 2 Preferred Pharmacy Pharmacy Name Phone [id number] [phone] Your Updated Medication List  
  
   
This list is accurate as of: 3/21/17 10:31 AM.  Always use your most recent med list.  
  
  
  
  
 buPROPion  mg XL tablet Commonly known as:  Ev Celis  
 Take 1 Tab by mouth every morning. cyclobenzaprine 10 mg tablet Commonly known as:  FLEXERIL Take 1 Tab by mouth nightly. escitalopram oxalate 10 mg tablet Commonly known as:  Mo Dub Take 10 mg by mouth daily. predniSONE 10 mg tablet Commonly known as:  DELTASONE  
4 tabs daily for 2 days, 3 tablets for 2 days, 2 tablets for 2 days, 1 tablet for 2 days, then 1/2 tab for 2 days  
  
 simvastatin 40 mg tablet Commonly known as:  ZOCOR  
TAKE 1 TAB BY MOUTH NIGHTLY. VESIcare 5 mg tablet Generic drug:  solifenacin Take 5 mg by mouth daily. XARELTO 15 mg (42)- 20 mg (9) Dspk Generic drug:  rivaroxaban Take  by mouth. Take with food, at approximately the same time each day. ZyrTEC 10 mg Cap Generic drug:  Cetirizine Take  by mouth. Prescriptions Sent to Pharmacy Refills  
 predniSONE (DELTASONE) 10 mg tablet 0 Si tabs daily for 2 days, 3 tablets for 2 days, 2 tablets for 2 days, 1 tablet for 2 days, then 1/2 tab for 2 days Class: Normal  
 Pharmacy: [id number] [facility] Ph #: [phone]  
 cyclobenzaprine (FLEXERIL) 10 mg tablet 0 Sig: Take 1 Tab by mouth nightly. Class: Normal  
 Pharmacy: [address] Ph #: [phone] Route: Oral  
  
Follow-up Instructions Return if symptoms worsen or fail to improve. Patient Instructions Prednisone 40mg daily for 2 days, 30mg daily 2 days, 20mg daily for 2 days, 10mg daily for 2 days, then 1/2 tab daily for 2 days. Introducing [facility]! Dear Ibrahima Escobedo: 
Thank you for requesting a [employer] account. Our records indicate that you already have an active [employer] account. You can access your account anytime at [email or web address] Did you know that you can access your hospital and ER discharge instructions at any time in [employer]? You can also review all of your test results from your hospital stay or ER visit. Additional Information If you have questions, please visit the Frequently Asked Questions section of the [employer] website at [email or web address] Remember, [employer] is NOT to be used for urgent needs. For medical emergencies, dial 911. Now available from your iPhone and Android! Please provide this summary of care documentation to your next provider. Your primary care clinician is listed as Billie Kimble. If you have any questions after today's visit, please call [phone].

## 2017-03-21 NOTE — PATIENT INSTRUCTIONS
Prednisone 40mg daily for 2 days, 30mg daily 2 days, 20mg daily for 2 days, 10mg daily for 2 days, then 1/2 tab daily for 2 days.

## 2017-04-03 NOTE — TELEPHONE ENCOUNTER
Patient was seen on 03/21/2017 for low back pain. She was given medication for it. She has since finished the medication but the back pain has not improved. Please have  call her with recommendations. Her number is [phone]

## 2017-04-04 PROBLEM — S72.91XA FEMUR FRACTURE, RIGHT (HCC): Status: ACTIVE | Noted: 2017-01-01

## 2017-04-04 NOTE — PROGRESS NOTES
Met with patient for CM initial assessment and to discuss discharge planning needs. Confirmed contact information and emergency contact information. Pt lives alone in a two story home has 6 steps at entrance. Pt owns crutches. Patient drives however her family can provide transportation to follow-up appointments. Patient states she has never used HH/SNF services in the past. Patients daughter will provide transportation at discharge. Care Management Interventions  PCP Verified by CM:  Yes (pt met PCP last week)  Mode of Transport at Discharge: Self (pt family will transport)  Transition of Care Consult (CM Consult): Discharge Planning  Discharge Durable Medical Equipment:  (pt owns crutches)  Health Maintenance Reviewed: Yes  Physical Therapy Consult: Yes  Occupational Therapy Consult: Yes  Speech Therapy Consult: No  Current Support Network: Lives Alone (two story home  with 6 steps at entrance)  Confirm Follow Up Transport:  (pt drives however pt famiily will transport )  Plan discussed with Pt/Family/Caregiver: Yes  Discharge Location  Discharge Placement:  (TBD)    Cindia Skiff  Ext [phone]

## 2017-04-04 NOTE — ED PROVIDER NOTES
HPI Comments: Suzi Mijares is a 61 y.o. female with PMHx significant for lung CA s/p right lower lobe lobectomy, h/o PE (on Xarelto for anticoagulation), who presents via EMS to [facility] ED with cc of right leg pain x 1 month, worsening today. Pt notes that about a month ago she started having discomfort in the right hip and right upper leg. She states that she had x-rays performed at an urgent care center and was subsequently diagnosed with sciatica and prescribed a short course of steroids. The steroids did not provide relief, but then her PCP prescribed her a longer dose of steroids which lasted until this past weekend. However today, pt notes that she got up and felt a \"popping\" sensation in the right leg with increased pain. She states that the pain feels the same, except it is more severe. She took Tylenol prior to arrival to the ED, but did not feel significant relief. Pt called EMS and they gave her 100 mcg Fentanyl en route. Pt notes that she still feels discomfort in the right hip, right upper leg, and occasional radiation into the right lateral calf. She reports exacerbation of the pain with ambulation, and she states that she has been unable to ambulate today secondary to her severe pain. Pt notes that she has been taking her Xarelto as prescribed, and she denies any leg swelling or recent long travel. Of note, pt states that she is scheduled for a chemotherapy infusion tomorrow. She specifically denies any nausea, vomiting, CP, and SOB. PCP: Leander Landrum MD  Heme/Onc: Dr. Maxine Giron     PMHx is significant for: intention tremor, stress incontinence, lung CA, h/o PE  PSHx is significant for: appendectomy, right lower lobe lobectomy, orthopedic surgery, colon endoscopy, breast bx   Social History: (-) Tobacco (former smoker), (-) EtOH, (-) Illicit Drugs     There are no other complaints, changes, or physical findings at this time. The history is provided by the patient.         Past Medical History:   Diagnosis Date    Cancer ([address]     [facility]    Depression     History of screening mammography 3/27/2012    Intention tremor     Pap smear for cervical cancer screening 3/27/2012    Stress incontinence     Tobacco abuse        Past Surgical History:   Procedure Laterality Date    ENDOSCOPY, COLON, DIAGNOSTIC  2006    HX BREAST BIOPSY Left 09/23/2014    HX ORTHOPAEDIC      [place] COLSC FLX W/REMOVAL LESION BY HOT BX FORCEPS  1/4/2013              Family History:   Problem Relation Age of Onset    Ovarian Cancer Mother     Elevated Lipids Mother     Clotting Disorder Mother     Cancer Mother      ovarian    Colon Cancer Father 48    Cancer Father 48     colon       Social History     Social History    Marital status: SINGLE     Spouse name: N/A    Number of children: N/A    Years of education: N/A     Occupational History    Not on file. Social History Main Topics    Smoking status: Former Smoker     Packs/day: 1.00     Years: 31.00     Quit date: 12/1/2009    Smokeless tobacco: Not on file    Alcohol use Yes    Drug use: Not on file    Sexual activity: Not on file     Other Topics Concern    Not on file     Social History Narrative         ALLERGIES: Tetracycline and Lortab [hydrocodone-acetaminophen]    Review of Systems   Constitutional: Negative for fever. Eyes: Negative. Respiratory: Negative. Negative for shortness of breath. Cardiovascular: Negative for chest pain and leg swelling. Gastrointestinal: Negative for abdominal pain, nausea and vomiting. Endocrine: Negative. Genitourinary: Negative. Negative for difficulty urinating, dysuria and hematuria. Musculoskeletal: Positive for arthralgias (right hip) and myalgias (right leg). Skin: Negative. Allergic/Immunologic: Negative. Neurological: Negative. Psychiatric/Behavioral: Negative for suicidal ideas. All other systems reviewed and are negative.       Vitals:    04/04/17 0035   BP: (!) 121/107 Pulse: 96   Resp: 26   Temp: 98.4 °F (36.9 °C)   SpO2: 99%            Physical Exam   Constitutional: She is oriented to person, place, and time. She appears well-developed and well-nourished. No distress. HENT:   Head: Normocephalic and atraumatic. Nose: Nose normal.   Eyes: Conjunctivae and EOM are normal. No scleral icterus. Neck: Normal range of motion. No tracheal deviation present. Cardiovascular: Normal rate, regular rhythm, normal heart sounds and intact distal pulses. Exam reveals no friction rub. No murmur heard. Pulmonary/Chest: Effort normal and breath sounds normal. No stridor. No respiratory distress. She has no wheezes. She has no rales. Abdominal: Soft. Bowel sounds are normal. She exhibits no distension. There is no tenderness. There is no rebound. Musculoskeletal: Normal range of motion. She exhibits no tenderness. Right knee: She exhibits bony tenderness. Lumbar back: She exhibits pain. Back:         Legs:  Neurological: She is alert and oriented to person, place, and time. No cranial nerve deficit. Skin: Skin is warm and dry. No rash noted. She is not diaphoretic. Psychiatric: She has a normal mood and affect. Her behavior is normal. Judgment and thought content normal.   Nursing note and vitals reviewed.        MDM  Number of Diagnoses or Management Options  Pathological fracture of right femur due to neoplastic disease, initial encounter [facility]):   Diagnosis management comments: DDX:  Disc disease, nerve compression, epidural abscess, dvt, muscle strain, fracture    Plan:  Analgesic, labs, muscle relaxant, DVT study, ct lumbar spine    Impression:  Pathologic fracture of R femur, bony metastasis from lung ca        Amount and/or Complexity of Data Reviewed  Clinical lab tests: reviewed and ordered  Tests in the radiology section of CPT®: ordered and reviewed  Decide to obtain previous medical records or to obtain history from someone other than the patient: yes  Obtain history from someone other than the patient: yes (EMS)  Review and summarize past medical records: yes  Discuss the patient with other providers: yes (Orthopedics, Hospitalist)  Independent visualization of images, tracings, or specimens: yes      ED Course       Procedures    I reviewed our electronic medical record system for any past medical records that were available that may contribute to the patients current condition, the nursing notes and and vital signs from today's visit    Nursing notes will be reviewed as they become available in realtime while the pt is in the ED. Sallie Dimas MD       I personally reviewed pt's imaging. Official read by radiology listed below. Sallie Dimas MD        Progress Note:  2:27 AM  Pt currently refusing U/S until she is given more pain medication. She has already received Valium and Toradol, but is requesting additional pain medication for relief. Will order Dilaudid. Written by SHERICE Smithibe, as dictated by Sallie Dimas MD.        Progress Note:  3:10 AM  Pt already in CT for lumbar imaging, but pathological fracture visualized in the right femur during  imaging. Will order CT of the femur for further evaluation and order additional pain medication. Written by SHERICE Smithibe, as dictated by Sallie Dimas MD.       Progress Note:  4:07 AM  Discussed imaging results with the patient, and all of her questions have been answered. Pt states that she had a DEXA scan in 05/2016 due to a fractured foot, and was told she did not have osteoporosis. She also had PET scan performed in 12/2016, but in reviewing those images it appears they only extend through the pelvis. Offered to call her daughter and bring her back to the ED, but pt does not wish to call her daughter at this time. Offered matta catheter, but she declines matta at this time. Will order Fentanyl as additional analgesic.  Awaiting tiger text response from Hayley Rosario NP (orthopedic surgery). Written by Marycruz Guzman ED Scribe, as dictated by Evelin Hester MD.       Consult Note:  4:11 AM   Evelin Hester MD spoke with Hayley Rosario NP   Specialty: Orthopedics  Reviewed pt's hx, disposition, and available diagnostic and imaging results. Reviewed pt's care plan. Consult agrees with plan as outlined. Recommends having hospitalist admit pt to the hospital. She will evaluate the patient in the ED. Written by Johnnie Merida, ED Scribe, as dictated by Evelin Hester MD.       CONSULT NOTE:   4:20 AM  Evelin Hester MD spoke with Dr. Jason David,   Specialty: Hospitalist  Discussed pt's hx, disposition, and available diagnostic and imaging results. Reviewed care plans. Consultant will evaluate pt for admission. Written by Johnnie Merida ED Scribe, as dictated by Evelin Hester MD.      LABORATORY TESTS:  Recent Results (from the past 12 hour(s))   PTT    Collection Time: 04/04/17  1:24 AM   Result Value Ref Range    aPTT 27.3 22.1 - 32.5 sec    aPTT, therapeutic range     58.0 - 77.0 SECS   PROTHROMBIN TIME + INR    Collection Time: 04/04/17  1:24 AM   Result Value Ref Range    INR 1.2 (H) 0.9 - 1.1      Prothrombin time 11.8 (H) 9.0 - 11.1 sec   CBC WITH AUTOMATED DIFF    Collection Time: 04/04/17  1:24 AM   Result Value Ref Range    WBC 6.1 3.6 - 11.0 K/uL    RBC 3.46 (L) 3.80 - 5.20 M/uL    HGB 10.8 (L) 11.5 - 16.0 g/dL    HCT 32.8 (L) 35.0 - 47.0 %    MCV 94.8 80.0 - 99.0 FL    MCH 31.2 26.0 - 34.0 PG    MCHC 32.9 30.0 - 36.5 g/dL    RDW 16.4 (H) 11.5 - 14.5 %    PLATELET 583 773 - 314 K/uL    NEUTROPHILS 74 32 - 75 %    LYMPHOCYTES 16 12 - 49 %    MONOCYTES 10 5 - 13 %    EOSINOPHILS 0 0 - 7 %    BASOPHILS 0 0 - 1 %    ABS. NEUTROPHILS 4.5 1.8 - 8.0 K/UL    ABS. LYMPHOCYTES 1.0 0.8 - 3.5 K/UL    ABS. MONOCYTES 0.6 0.0 - 1.0 K/UL    ABS. EOSINOPHILS 0.0 0.0 - 0.4 K/UL    ABS.  BASOPHILS 0.0 0.0 - 0.1 K/UL   METABOLIC PANEL, COMPREHENSIVE    Collection Time: 04/04/17  1:24 AM   Result Value Ref Range    Sodium 143 136 - 145 mmol/L    Potassium 3.9 3.5 - 5.1 mmol/L    Chloride 106 97 - 108 mmol/L    CO2 23 21 - 32 mmol/L    Anion gap 14 5 - 15 mmol/L    Glucose 153 (H) 65 - 100 mg/dL    BUN 20 6 - 20 MG/DL    Creatinine 1.18 (H) 0.55 - 1.02 MG/DL    BUN/Creatinine ratio 17 12 - 20      GFR est AA 57 (L) >60 ml/min/1.73m2    GFR est non-AA 47 (L) >60 ml/min/1.73m2    Calcium 8.6 8.5 - 10.1 MG/DL    Bilirubin, total 0.3 0.2 - 1.0 MG/DL    ALT (SGPT) 21 12 - 78 U/L    AST (SGOT) 14 (L) 15 - 37 U/L    Alk. phosphatase 66 45 - 117 U/L    Protein, total 7.0 6.4 - 8.2 g/dL    Albumin 3.1 (L) 3.5 - 5.0 g/dL    Globulin 3.9 2.0 - 4.0 g/dL    A-G Ratio 0.8 (L) 1.1 - 2.2         IMAGING RESULTS:  CT Results  (Last 48 hours)               04/04/17 0333  CT SPINE LUMB WO CONT Final result    Impression:  IMPRESSION:   1. Mild degenerative disc disease. No fracture. No significant stenosis           Narrative:  INDICATION:  severe lower back/R leg pain        EXAM: CT lumbar spine. No comparisons. Thin section axial images were obtained. From these sagittal and coronal   reformats were performed. CT dose reduction was achieved through use of a   standardized protocol tailored for this examination and automatic exposure   control for dose modulation. FINDINGS: Alignment is normal. No fracture or bone destruction. There are small   disc bulges at L2-3 through L5-S1 without significant stenosis. Small right pleural effusion. Aorta is partially calcified           04/04/17 0333  CT LOW EXT RT WO CONT Final result    Impression:  IMPRESSION:   1. Acute transverse fracture distal femoral diaphysis. Given that this is a   transverse fracture this is suspicious for a pathologic lesion           Narrative:  INDICATION:  Joint pain, femur            EXAM: CT right hip. No comparisons. Thin section axial images were obtained. From these sagittal and coronal   reformats were performed. CT dose reduction was achieved through use of a   standardized protocol tailored for this examination and automatic exposure   control for dose modulation. FINDINGS: There is an acute transverse fracture through the distal femoral   diaphysis with apex posterior angulation. There is no associated soft tissue   mass. No other bony abnormalities                   MEDICATIONS GIVEN:  Medications   diazePAM (VALIUM) injection 5 mg (5 mg IntraVENous Given 4/4/17 0127)   ketorolac (TORADOL) injection 15 mg (15 mg IntraVENous Given 4/4/17 0126)   HYDROmorphone (PF) (DILAUDID) injection 1 mg (1 mg IntraVENous Given 4/4/17 0234)   HYDROmorphone (PF) (DILAUDID) injection 1 mg (1 mg IntraVENous Given 4/4/17 0313)   fentaNYL citrate (PF) injection 100 mcg (100 mcg IntraVENous Given 4/4/17 0416)       IMPRESSION:  1. Pathological fracture of right femur due to neoplastic disease, initial encounter ([facility]        PLAN:  1. Admit to Hospitalist     Admission Note:  4:20 AM  Patient is being admitted to the hospital by Dr. Sherron Bloom. The results of their tests and reasons for their admission have been discussed with them and available family. They convey agreement and understanding for the need to be admitted and for their admission diagnosis. This note is prepared by Sarah Dickens. Juliann, acting as Scribe for Barbie Mcgrath MD.     Barbie Mcgrath MD: The scribe's documentation has been prepared under my direction and personally reviewed by me in its entirety. I confirm that the note above accurately reflects all work, treatment, procedures, and medical decision making performed by me.

## 2017-04-04 NOTE — PROGRESS NOTES
Ortho -  Bone scan reviewed - c/w pathologic fx of distal femur. ? acute R lower rib fx - no c/o pain - may be metastatic  Discussed with pathology and Orthopaedic Oncologist Dr. Arlet Wolff at [facility].   Rec'd biopsy laterally before performing fixation    Plan for biopsy tomorrow AM  ? Fixation Thursday pending results    NPO, consent

## 2017-04-04 NOTE — CONSULTS
ORTHOPAEDIC CONSULT NOTE    Subjective:     Date of Consultation:  April 4, 2017      Itzel Angulo is a 61 y.o. female with PMHx significant for lung CA s/p right lower lobe lobectomy, h/o PE (on Xarelto for anticoagulation), who presented to the ED with RLE pain with deformity. Pt notes that about a month ago she started having discomfort in the right hip and right upper leg. She states that she had x-rays performed at an urgent care center and was subsequently diagnosed with sciatica and prescribed a short course of steroids. The steroids did not provide relief, but then her PCP prescribed her a longer dose of steroids which lasted until this past weekend. However today, pt notes that she got up and felt a \"popping\" sensation in the right leg with increased pain. She states that the pain feels the same, except it is more severe. Workup in ED reveled a R distal pathologic femur fracture. Pt lives alone and works FT at [employer].    Patient Active Problem List    Diagnosis Date Noted    Femur fracture, right ([address] 04/04/2017    Malignant neoplasm of right lung ([address] 03/21/2017    S/P lobectomy of lung 11/23/2016    Hyperlipidemia 11/12/2014    Colon cancer screening 01/04/2013    Basal cell cancer 03/27/2012    Pap smear for cervical cancer screening 03/27/2012    History of screening mammography 03/27/2012    Depression     Stress incontinence      Family History   Problem Relation Age of Onset    Ovarian Cancer Mother     Elevated Lipids Mother     Clotting Disorder Mother     Cancer Mother      ovarian    Colon Cancer Father 48    Cancer Father 48     colon      Social History   Substance Use Topics    Smoking status: Former Smoker     Packs/day: 1.00     Years: 31.00     Quit date: 12/1/2009    Smokeless tobacco: Not on file    Alcohol use Yes     Past Medical History:   Diagnosis Date    Cancer ([address]     [facility], lung cancer    Depression     History of screening mammography 3/27/2012   Georganna Duverney Intention tremor     Pap smear for cervical cancer screening 3/27/2012    Stress incontinence     Tobacco abuse       Past Surgical History:   Procedure Laterality Date    ENDOSCOPY, COLON, DIAGNOSTIC  2006    HX BREAST BIOPSY Left 09/23/2014    HX ORTHOPAEDIC      [place] COLSC FLX W/REMOVAL LESION BY HOT BX FORCEPS  1/4/2013           Prior to Admission medications    Medication Sig Start Date End Date Taking? Authorizing Provider   rivaroxaban (XARELTO) 15 mg (42)- 20 mg (9) DsPk Take  by mouth. Take with food, at approximately the same time each day. Yes Historical Provider   Cetirizine (ZYRTEC) 10 mg cap Take  by mouth. Yes Historical Provider   escitalopram oxalate (LEXAPRO) 10 mg tablet Take 10 mg by mouth daily. 9/28/16  Yes Historical Provider   simvastatin (ZOCOR) 40 mg tablet TAKE 1 TAB BY MOUTH NIGHTLY. 9/22/16  Yes Anish Sadler MD   buPROPion XL (WELLBUTRIN XL) 300 mg XL tablet Take 1 Tab by mouth every morning. 11/18/13  Yes Anish Sadler MD   solifenacin (VESICARE) 5 mg tablet Take 5 mg by mouth daily. Yes Historical Provider   predniSONE (DELTASONE) 10 mg tablet 4 tabs daily for 2 days, 3 tablets for 2 days, 2 tablets for 2 days, 1 tablet for 2 days, then 1/2 tab for 2 days 3/21/17   Anish Sadler MD   cyclobenzaprine (FLEXERIL) 10 mg tablet Take 1 Tab by mouth nightly.  3/21/17   Anish Sadler MD     Current Facility-Administered Medications   Medication Dose Route Frequency    sodium chloride (NS) flush 5-10 mL  5-10 mL IntraVENous Q8H    sodium chloride (NS) flush 5-10 mL  5-10 mL IntraVENous PRN    acetaminophen (TYLENOL) tablet 650 mg  650 mg Oral Q6H PRN    naloxone (NARCAN) injection 0.4 mg  0.4 mg IntraVENous PRN    ondansetron (ZOFRAN) injection 4 mg  4 mg IntraVENous Q4H PRN    bisacodyl (DULCOLAX) suppository 10 mg  10 mg Rectal DAILY PRN    HYDROmorphone (PF) (DILAUDID) injection 0.5 mg  0.5 mg IntraVENous Q3H PRN    HYDROmorphone (PF) 15 mg/30 ml (DILAUDID) PCA   IntraVENous CONTINUOUS    HYDROmorphone (PF) (DILAUDID) injection 2 mg  2 mg IntraVENous ONCE     Current Outpatient Prescriptions   Medication Sig    rivaroxaban (XARELTO) 15 mg (42)- 20 mg (9) DsPk Take  by mouth. Take with food, at approximately the same time each day.  Cetirizine (ZYRTEC) 10 mg cap Take  by mouth.  escitalopram oxalate (LEXAPRO) 10 mg tablet Take 10 mg by mouth daily.  simvastatin (ZOCOR) 40 mg tablet TAKE 1 TAB BY MOUTH NIGHTLY.  buPROPion XL (WELLBUTRIN XL) 300 mg XL tablet Take 1 Tab by mouth every morning.  solifenacin (VESICARE) 5 mg tablet Take 5 mg by mouth daily.  predniSONE (DELTASONE) 10 mg tablet 4 tabs daily for 2 days, 3 tablets for 2 days, 2 tablets for 2 days, 1 tablet for 2 days, then 1/2 tab for 2 days    cyclobenzaprine (FLEXERIL) 10 mg tablet Take 1 Tab by mouth nightly. Allergies   Allergen Reactions    Tetracycline Hives     Palms and soles    Lortab [Hydrocodone-Acetaminophen] Itching        Review of Systems:  A comprehensive review of systems was negative except for that written in the HPI. Mental Status: no dementia    Objective:     Patient Vitals for the past 8 hrs:   BP Temp Pulse Resp SpO2   17 0035 (!) 121/107 98.4 °F (36.9 °C) 96 26 99 %     Temp (24hrs), Av.4 °F (36.9 °C), Min:98.4 °F (36.9 °C), Max:98.4 °F (36.9 °C)      Gen: Well-developed,  in no acute distress   Musc: RLE - + deformity of the distal femur, NVI   Skin: No skin breakdown noted. Skin warm, pink, dry  Neuro: Cranial nerves are grossly intact, no focal motor weakness, follows commands appropriately   Psych: Good insight, oriented to person, place and time, alert    Imaging Review:EXAM: CT right hip. No comparisons.     Thin section axial images were obtained. From these sagittal and coronal  reformats were performed.  CT dose reduction was achieved through use of a  standardized protocol tailored for this examination and automatic exposure  control for dose modulation.      FINDINGS: There is an acute transverse fracture through the distal femoral  diaphysis with apex posterior angulation. There is no associated soft tissue  mass. No other bony abnormalities     IMPRESSION  IMPRESSION:  1. Acute transverse fracture distal femoral diaphysis. Given that this is a  transverse fracture this is suspicious for a pathologic lesion    Recent Results (from the past 24 hour(s))   PTT    Collection Time: 04/04/17  1:24 AM   Result Value Ref Range    aPTT 27.3 22.1 - 32.5 sec    aPTT, therapeutic range     58.0 - 77.0 SECS   PROTHROMBIN TIME + INR    Collection Time: 04/04/17  1:24 AM   Result Value Ref Range    INR 1.2 (H) 0.9 - 1.1      Prothrombin time 11.8 (H) 9.0 - 11.1 sec   CBC WITH AUTOMATED DIFF    Collection Time: 04/04/17  1:24 AM   Result Value Ref Range    WBC 6.1 3.6 - 11.0 K/uL    RBC 3.46 (L) 3.80 - 5.20 M/uL    HGB 10.8 (L) 11.5 - 16.0 g/dL    HCT 32.8 (L) 35.0 - 47.0 %    MCV 94.8 80.0 - 99.0 FL    MCH 31.2 26.0 - 34.0 PG    MCHC 32.9 30.0 - 36.5 g/dL    RDW 16.4 (H) 11.5 - 14.5 %    PLATELET 292 963 - 252 K/uL    NEUTROPHILS 74 32 - 75 %    LYMPHOCYTES 16 12 - 49 %    MONOCYTES 10 5 - 13 %    EOSINOPHILS 0 0 - 7 %    BASOPHILS 0 0 - 1 %    ABS. NEUTROPHILS 4.5 1.8 - 8.0 K/UL    ABS. LYMPHOCYTES 1.0 0.8 - 3.5 K/UL    ABS. MONOCYTES 0.6 0.0 - 1.0 K/UL    ABS. EOSINOPHILS 0.0 0.0 - 0.4 K/UL    ABS.  BASOPHILS 0.0 0.0 - 0.1 K/UL   METABOLIC PANEL, COMPREHENSIVE    Collection Time: 04/04/17  1:24 AM   Result Value Ref Range    Sodium 143 136 - 145 mmol/L    Potassium 3.9 3.5 - 5.1 mmol/L    Chloride 106 97 - 108 mmol/L    CO2 23 21 - 32 mmol/L    Anion gap 14 5 - 15 mmol/L    Glucose 153 (H) 65 - 100 mg/dL    BUN 20 6 - 20 MG/DL    Creatinine 1.18 (H) 0.55 - 1.02 MG/DL    BUN/Creatinine ratio 17 12 - 20      GFR est AA 57 (L) >60 ml/min/1.73m2    GFR est non-AA 47 (L) >60 ml/min/1.73m2    Calcium 8.6 8.5 - 10.1 MG/DL    Bilirubin, total 0.3 0.2 - 1.0 MG/DL    ALT (SGPT) 21 12 - 78 U/L    AST (SGOT) 14 (L) 15 - 37 U/L    Alk. phosphatase 66 45 - 117 U/L    Protein, total 7.0 6.4 - 8.2 g/dL    Albumin 3.1 (L) 3.5 - 5.0 g/dL    Globulin 3.9 2.0 - 4.0 g/dL    A-G Ratio 0.8 (L) 1.1 - 2.2           Impression:     Patient Active Problem List    Diagnosis Date Noted    Femur fracture, right ([address] 04/04/2017    Malignant neoplasm of right lung ([address] 03/21/2017    S/P lobectomy of lung 11/23/2016    Hyperlipidemia 11/12/2014    Colon cancer screening 01/04/2013    Basal cell cancer 03/27/2012    Pap smear for cervical cancer screening 03/27/2012    History of screening mammography 03/27/2012    Depression     Stress incontinence      Active Problems:    Femur fracture, right ([address] (4/4/2017)        Plan:   -  Pt is stable orthopaedically  -  Knee immobilizer placed to RLE, NVI pre and post   -  Full Body Bone Scan today - plan to follow   -  Pain management with PCA, pt may khushi, ganga garcia, NP  Orthopedic Nurse Practitioner   Chiki Yin     This patient was seen and examined by be me personally with the findings as documented above by the NP/PA with the following changes/additions:    NONE - will need work-up but likely metastatic lesion. Will scan and plan for biopsy. If it confirms metastatic cancer, will treat with IM nail. All pertinent medical history, medications, and test results and imaging were reviewed by me personally. Plan for treatment is as described and formulated by me after discussion with the patient and family personally.

## 2017-04-04 NOTE — IP AVS SNAPSHOT
Current Discharge Medication List  
  
START taking these medications Dose & Instructions Dispensing Information Comments Morning Noon Evening Bedtime  
 bisacodyl 10 mg suppository Commonly known as:  DULCOLAX Your last dose was: Your next dose is:    
   
   
 Dose:  10 mg Insert 10 mg into rectum daily. Quantity:  1 Each Refills:  0  
     
   
   
   
  
 famotidine 20 mg tablet Commonly known as:  PEPCID Your last dose was: Your next dose is:    
   
   
 Dose:  20 mg Take 1 Tab by mouth two (2) times a day. Quantity:  30 Tab Refills:  0 HYDROcodone-acetaminophen  mg tablet Commonly known as:  Azeb Gowers Your last dose was: Your next dose is:    
   
   
 Dose:  1 Tab Take 1 Tab by mouth every four (4) hours as needed. Max Daily Amount: 6 Tabs. Quantity:  15 Tab Refills:  0  
     
   
   
   
  
 polyethylene glycol 17 gram packet Commonly known as:  Scott Osgood Your last dose was: Your next dose is:    
   
   
 Dose:  17 g Take 1 Packet by mouth daily. Quantity:  1 Each Refills:  0 CONTINUE these medications which have NOT CHANGED Dose & Instructions Dispensing Information Comments Morning Noon Evening Bedtime buPROPion  mg XL tablet Commonly known as:  Tarri Kelso Your last dose was: Your next dose is:    
   
   
 Dose:  300 mg Take 1 Tab by mouth every morning. Quantity:  90 Tab Refills:  0  
     
   
   
   
  
 cyclobenzaprine 10 mg tablet Commonly known as:  FLEXERIL Your last dose was: Your next dose is:    
   
   
 Dose:  10 mg Take 1 Tab by mouth nightly. Quantity:  10 Tab Refills:  0  
     
   
   
   
  
 escitalopram oxalate 10 mg tablet Commonly known as:  Mammmack Oas Your last dose was:     
   
Your next dose is:    
   
   
 Dose:  10 mg  
 Take 10 mg by mouth daily. Refills:  0  
     
   
   
   
  
 simvastatin 40 mg tablet Commonly known as:  ZOCOR Your last dose was: Your next dose is: TAKE 1 TAB BY MOUTH NIGHTLY. Quantity:  90 Tab Refills:  1 NEED REFILSL VESIcare 5 mg tablet Generic drug:  solifenacin Your last dose was: Your next dose is:    
   
   
 Dose:  5 mg Take 5 mg by mouth daily. Refills:  0 XARELTO 15 mg (42)- 20 mg (9) Dspk Generic drug:  rivaroxaban Your last dose was: Your next dose is: Take  by mouth. Take with food, at approximately the same time each day. Refills:  0 ZyrTEC 10 mg Cap Generic drug:  Cetirizine Your last dose was: Your next dose is: Take  by mouth. Refills:  0 STOP taking these medications   
 predniSONE 10 mg tablet Commonly known as:  Manuelito Amador Where to Get Your Medications Information on where to get these meds will be given to you by the nurse or doctor. ! Ask your nurse or doctor about these medications  
  bisacodyl 10 mg suppository  
 cyclobenzaprine 10 mg tablet  
 famotidine 20 mg tablet HYDROcodone-acetaminophen  mg tablet  
 polyethylene glycol 17 gram packet

## 2017-04-04 NOTE — H&P
Hospitalist Admission Note    NAME:  Sheri Poon   :   1960   MRN:   [id number]     Date of admit:  2017    PCP: Tino Han MD    Assessment/Plan:      Femur fracture, right ([address] (2017), likely pathologic POA  Increasing pain x 1 month, acute severe pain after a 'popping sound\"  Not able to ambulate, came to ED, no trauma  CT scan in ED with distal right femur fracture, ? Pathologic  Orthopedics following, will decide upon surgical Rx  IV dilaudid PRN    Recent pulmonary embolism 1 month ago POA diagnosed at [facility]  Currently on Am dose of xarelto  Will transition to SQ lovenox q12, stop once surgery has decided upon Rx plan  Resume xarelto at discharge    Lung cancer RLL POA s/p resection [facility] 2016  Currently receiving chemo at [address] with [address] Leiva  No prior mets, body PET scan (no LE extremities) in 2016 was negative  Ask Dr April Stewart to see  Checking bone scan    Elevated BP with diagnosis of HTN POA  Suspect related to pain  Pain controlled  PRN labetalol    Anxiety/depression POA  Continue lexapro and wellbutrin     Hyperlipidemia POA  Continue statin    DVT prophylaxis: lovenox 1 mg/kg SQ     Stress ulcer prophylaxis: Not indicated    Code status: Full code    Subjective:   CHIEF COMPLAINT:  Severe right leg pain today    HISTORY OF PRESENT ILLNESS:      61 y.o.    female:  Diagnosed with RLL lung cancer 2016, s/p resection at [facility]  Followed by Dr Leiva at [facility], started chemo and is set to receive the 3rd cycle  Admitted ~ 1 month ago with pulmonary embolism at [facility], started on xarelto  1 month ago developed a aching sensation in upper thigh, worse with movement  Received courses of medrol and prednisone without benefit   Had X-rays as an outpatient, only told she had OA  Pain gradually worsened over time  4/3/2017 got up up, pain was worse, took some tylenol  Later in day seard several \"Popping sounds\", then developed severe pain in the right thigh  Not able to walk, pain worse with any movement. Called EMS, brought to the ED    ED /107  Multiple doses IV pain meds  CT scan right leg Acute transverse fracture distal femoral diaphysis. Given that this is a  transverse fracture this is suspicious for a pathologic lesion  Ortho consulted, we were asked to admit the patient    Past Medical History:   Diagnosis Date    Cancer ([address]     BCC, lung cancer    Depression     History of screening mammography 3/27/2012    Intention tremor     Pap smear for cervical cancer screening 3/27/2012    Stress incontinence     Tobacco abuse         Past Surgical History:   Procedure Laterality Date    ENDOSCOPY, COLON, DIAGNOSTIC  2006    HX BREAST BIOPSY Left 09/23/2014    HX ORTHOPAEDIC      NH COLSC FLX W/REMOVAL LESION BY HOT BX FORCEPS  1/4/2013            Social History:  Social History   Substance Use Topics    Smoking status: Former Smoker     Packs/day: 1.00     Years: 31.00     Quit date: 12/1/2009    Smokeless tobacco: Not on file    Alcohol use Yes    Works at [employer]    FAMILY HISTORY:  Family History   Problem Relation Age of Onset    Ovarian Cancer Mother     Elevated Lipids Mother     Clotting Disorder Mother     Cancer Mother      ovarian    Colon Cancer Father 48    Cancer Father 48     colon   1 son age 28    Allergies   Allergen Reactions    Tetracycline Hives     Palms and soles    Lortab [Hydrocodone-Acetaminophen] Itching        Prior to Admission medications    Medication Sig Start Date End Date Taking? Authorizing Provider   rivaroxaban (XARELTO) 15 mg (42)- 20 mg (9) DsPk Take  by mouth. Take with food, at approximately the same time each day. Yes Historical Provider   Cetirizine (ZYRTEC) 10 mg cap Take  by mouth. Yes Historical Provider   escitalopram oxalate (LEXAPRO) 10 mg tablet Take 10 mg by mouth daily.  9/28/16  Yes Historical Provider   simvastatin (ZOCOR) 40 mg tablet TAKE 1 TAB BY MOUTH NIGHTLY. 16  Yes Callie Vaz MD   buPROPion XL (WELLBUTRIN XL) 300 mg XL tablet Take 1 Tab by mouth every morning. 13  Yes Callie Vaz MD   solifenacin (VESICARE) 5 mg tablet Take 5 mg by mouth daily. Yes Historical Provider   predniSONE (DELTASONE) 10 mg tablet 4 tabs daily for 2 days, 3 tablets for 2 days, 2 tablets for 2 days, 1 tablet for 2 days, then 1/2 tab for 2 days 3/21/17   Callie Vaz MD   cyclobenzaprine (FLEXERIL) 10 mg tablet Take 1 Tab by mouth nightly.  3/21/17   Callie Vaz MD       REVIEW OF SYSTEMS:  Bold equals positive    Yes Total of 12 systems reviewed as follows:  Constitutional: fever Chills   Eyes:   Diplopia visual changes  eye pain  ENT:   coryza  Sore throat      Dysphagia  Respiratory:  cough or sputum  Hemoptysis dyspnea  Cards:  chest pain  orthopnea LE edema  GI:   Nausea/vomiting Diarrhea abdominal pain    melena  BRBPR  Genitourinary:  frequency  dysuria hematuria  Integument:  Rash Ulcers  Nodules  Hematologic:  Easy bruising, negative gum/nose bleeding  Endocrine: Polyuria/poldipsia heat/cold intolerance  Musculoskel: Myalgias Severe right leg pain Gout  Neurological:  Headaches  focal motor or sensory changes    Objective:   VITALS:    Patient Vitals for the past 24 hrs:   Temp Pulse Resp BP SpO2   17 0845 - - - - 97 %   17 0821 - - - - 98 %   17 0818 98.2 °F (36.8 °C) 94 17 133/73 (!) 88 %   17 0815 - - - (!) 115/100 94 %   17 0035 98.4 °F (36.9 °C) 96 26 (!) 121/107 99 %     Temp (24hrs), Av.3 °F (36.8 °C), Min:98.2 °F (36.8 °C), Max:98.4 °F (36.9 °C)    O2 Flow Rate (L/min): 2 l/min O2 Device: Nasal cannula    PHYSICAL EXAM:   General:    Alert, cooperative in no distress     HEENT: Normocephalic, atraumatic    PERRL, EOMI  Sclera no icterus    Nasal mucosa without masses or discharge  Hearing intact to voice    Oropharynx without erythema or exudate  Pink MM  Neck:  No meningismus, trachea midline, no carotid bruits     Thyroid not enlarged, no nodules or tenderness  Lungs:   Clear to auscultation bilaterally. No wheezing or rales    No accessory muscle use or retractions. Heart:   Regular rate and rhythm,  no murmur or gallop. No LE edema  Abdomen:   Soft, non-tender. Not distended. Bowel sounds normal.     No masses, No Hepatosplenomegaly, No Rebound or guarding  Lymph nodes: No cervical or inguinal CHARLY  Musculoskeletal:  No Joint swelling, erythema, warmth. No Cyanosis or clubbing    Right knee immobilizer in place  Skin:      No rashes     Not Jaundiced   No nodules or thickening  Neurologic: Alert, slow after pain medications, oriented X 3, follows commands     Cranial nerves 2 to 12 intact    Symmetric motor strength bilaterally(right leg not tested)         LAB DATA REVIEWED:    Recent Results (from the past 24 hour(s))   PTT    Collection Time: 04/04/17  1:24 AM   Result Value Ref Range    aPTT 27.3 22.1 - 32.5 sec    aPTT, therapeutic range     58.0 - 77.0 SECS   PROTHROMBIN TIME + INR    Collection Time: 04/04/17  1:24 AM   Result Value Ref Range    INR 1.2 (H) 0.9 - 1.1      Prothrombin time 11.8 (H) 9.0 - 11.1 sec   CBC WITH AUTOMATED DIFF    Collection Time: 04/04/17  1:24 AM   Result Value Ref Range    WBC 6.1 3.6 - 11.0 K/uL    RBC 3.46 (L) 3.80 - 5.20 M/uL    HGB 10.8 (L) 11.5 - 16.0 g/dL    HCT 32.8 (L) 35.0 - 47.0 %    MCV 94.8 80.0 - 99.0 FL    MCH 31.2 26.0 - 34.0 PG    MCHC 32.9 30.0 - 36.5 g/dL    RDW 16.4 (H) 11.5 - 14.5 %    PLATELET 645 078 - 933 K/uL    NEUTROPHILS 74 32 - 75 %    LYMPHOCYTES 16 12 - 49 %    MONOCYTES 10 5 - 13 %    EOSINOPHILS 0 0 - 7 %    BASOPHILS 0 0 - 1 %    ABS. NEUTROPHILS 4.5 1.8 - 8.0 K/UL    ABS. LYMPHOCYTES 1.0 0.8 - 3.5 K/UL    ABS. MONOCYTES 0.6 0.0 - 1.0 K/UL    ABS. EOSINOPHILS 0.0 0.0 - 0.4 K/UL    ABS.  BASOPHILS 0.0 0.0 - 0.1 K/UL   METABOLIC PANEL, COMPREHENSIVE    Collection Time: 04/04/17  1:24 AM   Result Value Ref Range    Sodium 143 136 - 145 mmol/L    Potassium 3.9 3.5 - 5.1 mmol/L    Chloride 106 97 - 108 mmol/L    CO2 23 21 - 32 mmol/L    Anion gap 14 5 - 15 mmol/L    Glucose 153 (H) 65 - 100 mg/dL    BUN 20 6 - 20 MG/DL    Creatinine 1.18 (H) 0.55 - 1.02 MG/DL    BUN/Creatinine ratio 17 12 - 20      GFR est AA 57 (L) >60 ml/min/1.73m2    GFR est non-AA 47 (L) >60 ml/min/1.73m2    Calcium 8.6 8.5 - 10.1 MG/DL    Bilirubin, total 0.3 0.2 - 1.0 MG/DL    ALT (SGPT) 21 12 - 78 U/L    AST (SGOT) 14 (L) 15 - 37 U/L    Alk. phosphatase 66 45 - 117 U/L    Protein, total 7.0 6.4 - 8.2 g/dL    Albumin 3.1 (L) 3.5 - 5.0 g/dL    Globulin 3.9 2.0 - 4.0 g/dL    A-G Ratio 0.8 (L) 1.1 - 2.2         I have reviewed the results of all available imaging studies. Yes I have personally reviewed the actual    xray  And/or CT scan    CT scan right lower extremity FINDINGS:   There is an acute transverse fracture through the distal femoral  diaphysis with apex posterior angulation. There is no associated soft tissue  mass. No other bony abnormalities  IMPRESSION:  1. Acute transverse fracture distal femoral diaphysis. Given that this is a  transverse fracture this is suspicious for a pathologic lesion    CT scan lumbar spine FINDINGS:   Alignment is normal. No fracture or bone destruction. There are small  disc bulges at L2-3 through L5-S1 without significant stenosis. Small right pleural effusion. Aorta is partially calcified  IMPRESSION:  1. Mild degenerative disc disease. No fracture. No significant stenosis    ___________________________________________________    Inpatient is warranted for this patient because they presents with right leg pain   I have a high level of concern for pathologic right femur fracture  I anticipate the stay in the hospital will span at least 2 midnights.     My assessment of the clinical condition and my plan of care is as outlined above  __________________________________________________    Care Plan discussed with:    Patient, ED Doc, orthopedics    I saw the patient personally, took a history and did a complete physical exam at the bedside. I performed complex decision making in coming up with a diagnostic and treatment plan for the patient. I reviewed the patient's past medical records, current laboratory and radiology results, and actual Xray films/EKG. I have also discussed this case with the involved ED physician.     Risk of deterioration:  Moderate, High    Total Time Coordinating Admission:  60    minutes    Total Critical Care Time:     Admitting Physician: Surekha Hollingsworth MD

## 2017-04-04 NOTE — PROGRESS NOTES
Report received from [facility]. Assisted with placement of knee immobilizer, 5mg Valium and 1mg Dilaudid given IV. Pt resting in position of comfort presently. Placed on 2 L NC as sats dropped intially to 88%, sats currently 97%. Nuclear med in to give med for bone scan. Bone scan should occur at 1130am. Dr. Dexter Mc in to see pt.

## 2017-04-04 NOTE — TELEPHONE ENCOUNTER
Patient presented to [facility] for significant right hip pain yesterday. Admitted with right femur fracture. Suspicious for pathologic lesion. She has lung cancer and is in treatment at this time. Plan: surgery tomorrow.

## 2017-04-04 NOTE — PROGRESS NOTES
Spoke with Dr. Romeo Mata earlier before 4pm regarding plan of right femur bone biopsy in AM  and to keep pt NPO after midnight, attempted to get consent signed for ordered procedure but  pt does not want to sign consent at this time as she has more questions for Dr. Walters Section but she agreed to be NPO after midnight for the procedure, says she needs to talk to Dr. Romeo Mata first in AM.

## 2017-04-04 NOTE — PROGRESS NOTES
TRANSFER - IN REPORT:    Verbal report received from Gilma(name) on Nanci Varma  being received from ED(unit) for routine progression of care      Report consisted of patients Situation, Background, Assessment and   Recommendations(SBAR). Information from the following report(s) SBAR, Kardex, Intake/Output and MAR was reviewed with the receiving nurse. Opportunity for questions and clarification was provided. Assessment completed upon patients arrival to unit and care assumed.

## 2017-04-04 NOTE — CONSULTS
[address]    [facility]       Name:  Scooby William   MR#:  [id number]   :  1960   Account #:  [de-identified]    Date of Consultation:  2017   Date of Adm:  2017       REASON FOR CONSULTATION: History of lung cancer, now with   possible pathological fracture. REFERRING PHYSICIAN: Nicole Mahoney MD    HISTORY OF PRESENT ILLNESS: The patient is a 78-year-old white   female who is a patient of one of my partners, Dr. Bianca Lagunas. The patient   had a lung cancer removed with a right lower lobe lobectomy in   2016. She has been on cisplatin and Alimta. She has had 2 cycles   so far. She was supposed to have her third cycle today. She also has a   history of a recent PE that she had about a month ago. She has been   on Xarelto. She comes in now with pain that developed in her upper   thigh, worse with movement. She got up yesterday and the pain was   worse. She heard a popping sound. She came to the emergency room. She had a CT scan done of her lower extremity and lower spine that   showed diffuse, acute, transverse fracture distal femoral diaphysis,   concerning for possible pathological fracture. Her lumbar spine CT was   negative except for some arthritis. The patient has been admitted to   most likely have surgery. She last took her Xarelto yesterday. She has   been put on Lovenox, and the plan is to hold that after we know the   plan for surgery. The patient reports, otherwise, she is doing fairly well. Her breathing is good. PAST MEDICAL HISTORY: Significant for lung cancer as above,   getting adjuvant treatment, some depression. SOCIAL HISTORY: She is a former smoker. She occasionally drinks. She works at [employer]. FAMILY HISTORY: Positive family history both her mother with ovarian   cancer and father with colon cancer. ALLERGIES TO   1. TETRACYCLINE. 2. LORTAB.     CURRENT MEDICATIONS: She is on   1. Lovenox. 2. Wellbutrin. 3. Lexapro. 4. Oxybutynin. 5. Pravastatin. 6. Dilaudid PCA. REVIEW OF SYSTEMS: A 12-point systems done and negative except   for as above. PHYSICAL EXAMINATION   VITAL SIGNS: Temp 98.2, pulse 94, blood pressure 133/73,   respirations 17, saturating 97%. GENERAL: Lying in bed in no acute distress. HENT: Normocephalic, atraumatic. NECK: No cervical, supraclavicular lymphadenopathy appreciated. CARDIOVASCULAR: Regular rate and rhythm. LUNGS: Clear to auscultation bilaterally. ABDOMEN: Normoactive bowel sounds, soft, nontender,   nondistended. No hepatosplenomegaly. EXTREMITIES: No clubbing, cyanosis or edema. NEUROLOGIC: Nonfocal.    LABORATORY VALUES: White blood cell count 6.1, hemoglobin 10.8,   platelets 462. Chemistry: Sodium 143, potassium 3.9, chloride 106,   bicarbonate 23, glucose 153, BUN 20, creatinine 1.18, total bilirubin   0.3, ALT 21, AST 14, alkaline phosphatase 66. IMPRESSION AND PLAN: The patient is a 67-year-old white female   who is currently being treated adjuvantly for a lung cancer. She had   surgery. She has had no evidence of disease elsewhere. She has   been getting cisplatin and Alimta through one of my partners, Dr. Marcela Potter. She has had 2 cycles so far. She was supposed to get her   third cycle today, but was admitted with a possible pathological fracture   of her right femur. She had been on Xarelto for pulmonary embolism   about a month ago. The plan is she last took her Xarelto yesterday,   she is now on Lovenox, and once Orthopedic Surgery has decided   when surgery is going to be, would stop the Lovenox the day before. She will need to go back on anticoagulation afterwards, perhaps   Lovenox, and then when deemed okay by Surgery, to go back on the   Xarelto. She has got a bone scan ordered to see if there is any other   evidence of bony disease.  I would recommend getting a biopsy of the   area when she has surgery, see if that really is a malignant or not. We   will continue to follow along with you. Obviously, we are holding her   chemotherapy until she gets over this, and we will continue to follow   along with you.         Joy Euler, MD Devoria Dubin / Yana Sherman   D:  04/04/2017   10:54   T:  04/04/2017   11:21   Job #:  [id number]

## 2017-04-04 NOTE — IP AVS SNAPSHOT
[address] [facility] 
[phone] Patient: Sheri Poon MRN: [id number] SOP:5/62/4416 You are allergic to the following Allergen Reactions Tetracycline Hives Palms and soles Lortab (Hydrocodone-Acetaminophen) Itching Recent Documentation Height Weight Breastfeeding? BMI OB Status Smoking Status 1.575 m 72 kg No 29.03 kg/m2 Postmenopausal Former Smoker Unresulted Labs Order Current Status CULTURE, BLOOD, PAIRED Preliminary result Emergency Contacts Name Discharge Info Relation Home Work Mobile Cordelia Varma DISCHARGE CAREGIVER [3] Daughter [21] [phone] Cordelia Varma  Child [2] [phone] About your hospitalization You were admitted on:  April 4, 2017 You last received care in the:  [facility] 3 ORTHOPEDICS You were discharged on:  April 11, 2017 Unit phone number:  [phone] Why you were hospitalized Your primary diagnosis was:  Not on File Your diagnoses also included:  Femur Fracture, Right (Hcc) Providers Seen During Your Hospitalizations Provider Role Specialty Primary office phone Jill Ahumada, MD Attending Provider Emergency Medicine [phone] Surekha Hollingsworth MD Attending Provider Hospitalist [phone] Larena Pallas, MD Attending Provider Internal Medicine [phone] Your Primary Care Physician (PCP) Primary Care Physician Office Phone Office Fax [phone] [address] [phone] Follow-up Information Follow up With Details Comments Contact Info Maty Malone MD In 2 weeks  [address] [facility] [address] [facility] 
[phone] Dale Chisholm MD In 1 week  [address] [address] 
[phone] [facility]) On 4/10/2017 This is your skilled nursing provider. [address] [facility] [address] [place] [address] 
[phone] Earl Kang MD   [address] [facility] [address] [facility] Veronica Flores 
[phone] Current Discharge Medication List  
  
START taking these medications Dose & Instructions Dispensing Information Comments Morning Noon Evening Bedtime  
 bisacodyl 10 mg suppository Commonly known as:  DULCOLAX Your last dose was: Your next dose is:    
   
   
 Dose:  10 mg Insert 10 mg into rectum daily. Quantity:  1 Each Refills:  0  
     
   
   
   
  
 famotidine 20 mg tablet Commonly known as:  PEPCID Your last dose was: Your next dose is:    
   
   
 Dose:  20 mg Take 1 Tab by mouth two (2) times a day. Quantity:  30 Tab Refills:  0 HYDROcodone-acetaminophen  mg tablet Commonly known as:  Rafael Fischer Your last dose was: Your next dose is:    
   
   
 Dose:  1 Tab Take 1 Tab by mouth every four (4) hours as needed. Max Daily Amount: 6 Tabs. Quantity:  15 Tab Refills:  0  
     
   
   
   
  
 polyethylene glycol 17 gram packet Commonly known as:  HutGrip Space Your last dose was: Your next dose is:    
   
   
 Dose:  17 g Take 1 Packet by mouth daily. Quantity:  1 Each Refills:  0 CONTINUE these medications which have NOT CHANGED Dose & Instructions Dispensing Information Comments Morning Noon Evening Bedtime buPROPion  mg XL tablet Commonly known as:  Ana Lilia Arora Your last dose was: Your next dose is:    
   
   
 Dose:  300 mg Take 1 Tab by mouth every morning. Quantity:  90 Tab Refills:  0  
     
   
   
   
  
 cyclobenzaprine 10 mg tablet Commonly known as:  FLEXERIL Your last dose was: Your next dose is:    
   
   
 Dose:  10 mg Take 1 Tab by mouth nightly. Quantity:  10 Tab Refills:  0 escitalopram oxalate 10 mg tablet Commonly known as:  Grace Matar Your last dose was: Your next dose is:    
   
   
 Dose:  10 mg Take 10 mg by mouth daily. Refills:  0  
     
   
   
   
  
 simvastatin 40 mg tablet Commonly known as:  ZOCOR Your last dose was: Your next dose is: TAKE 1 TAB BY MOUTH NIGHTLY. Quantity:  90 Tab Refills:  1 NEED REFILSL VESIcare 5 mg tablet Generic drug:  solifenacin Your last dose was: Your next dose is:    
   
   
 Dose:  5 mg Take 5 mg by mouth daily. Refills:  0 XARELTO 15 mg (42)- 20 mg (9) Dspk Generic drug:  rivaroxaban Your last dose was: Your next dose is: Take  by mouth. Take with food, at approximately the same time each day. Refills:  0 ZyrTEC 10 mg Cap Generic drug:  Cetirizine Your last dose was: Your next dose is: Take  by mouth. Refills:  0 STOP taking these medications   
 predniSONE 10 mg tablet Commonly known as:  Peggy Diggs Where to Get Your Medications Information on where to get these meds will be given to you by the nurse or doctor. ! Ask your nurse or doctor about these medications  
  bisacodyl 10 mg suppository  
 cyclobenzaprine 10 mg tablet  
 famotidine 20 mg tablet HYDROcodone-acetaminophen  mg tablet  
 polyethylene glycol 17 gram packet Discharge Instructions [address] [place] [address] Orthopedics [facility] Discharge Instruction Sheet: Hip Fracture Repair Dr. Bobby Cade Blood Clot Prevention Xarelto You will continue to take your Xarelto as a blood thing to treat the blood clot in your lungs from a             month ago. Follow the instructions per the doctor who ordered that. Pain control: 
Medications ? Typically, we will prescribe a narcotic usually 1-2 tabs every three to four hours as needed for your pain. Most patients need this only for the first few weeks. ? You should discontinue this as the pain decreases. ? Some of these medications contain a large dose of Tylenol (acetaminophen). Therefore, you should consult your pharmacist before taking any additional Tylenol at the same time. You should not drive while taking any narcotic pain medications. Take your pain medications with food to prevent nausea! Your last dose of pain medication in the hospital was given @ :__________ Ice Therapy ? You will be discharged home with ice/gel packs. ? Continue using these regularly to minimize pain and swelling, especially after physical activity. Constipation ? Pain medication and anesthesia can be constipating-this can be prevented by gentle physical activity and drinking plenty of fluid. ? It should be treated with over-the-counter medications such as Dulcolax, Miralax, Magnesium Citrate and/or Fleets enema. ? You should have a bowel movement at least every other day following surgery. Incision care ? You will be discharged with a transparent and waterproof dressing over your incision. o This should remain in place for 5-7 days after discharge. ? You will be given one additional dressing to use at home in 5-7 days if you are still having drainage ? You may shower with this dressing in place after you are discharged. o After showering pat the dressing/incision dry. ? When the incision is no longer draining, it may be left open to the air. ? DO NOT rub the incision. ? DO NOT take a tub bath or go swimming until cleared by your doctor. ? DO NOT apply lotions, oils, or creams to incision. To increase and promote healing: 
? Stop Smoking (or at least cut back on smoking). ? Eat a well-balanced diet (high in protein and vitamin C) ? If your appetite is poor, consider nutritional supplements like Ensure, Glucerna, or Silver Star Instant Breakfast. 
? If you are diabetic, controlling you blood sugars is very important to prevent infection and promote wound healing. Nutrition: ? If you were on a supplement such as Ensure or Glucerna) while in the hospital, please continue using them with each meal for the next 30 days. ? Eat a well-balanced diet - High in protein, high in vitamins and minerals, especially vitamin C and zinc.  
 
Home Health: 
? If you have staples a home health nurse will remove them in about 10 days. ? Physical Therapy will come to your home to continue training for walking, transferring and exercises Physical Activity ? You can toe touch weight bear on your right leg (TTWB). ? Bed-rest may slow your recovery. ? Use your walker and take short walks about every 2 hours. ? Increase your distance each day. ? NO DRIVING until told to do so. Warning signs: Please call your physician immediately at [phone] if you have ? Bleeding from incision that is constant. ? Change in mental status (unusual behavior or confusion) ? If your incision develops redness or swelling 
? Change in wound drainage (increase in amount, color, or foul odor) ? Temperature over 101.5 degrees Fahrenheit ? Pain in the calf of your leg ? Tenderness or redness in the calf of your leg 
? Increased swelling of the thigh, ankle, calf, or foot. Emergency: CALL 911 if you have ? Shortness of breath ? Chest pain ? Localized chest pain when coughing or taking a deep breath Follow-up ? Please call your surgeons office at [phone] for a follow up appointment. o You should call as soon as you get home or the next day after discharge. o Ask to make an appointment in 2 weeks. ? You can return to work when cleared by a physician. During normal business hours you may reach Dr. Bobby Cade' team directly at [phone] if you have concerns or questions. Discharge Orders None [email or web address] Announcement We are excited to announce that we are making your provider's discharge notes available to you in [email or web address]. You will see these notes when they are completed and signed by the physician that discharged you from your recent hospital stay. If you have any questions or concerns about any information you see in [email or web address], please call the Health Information Department where you were seen or reach out to your Primary Care Provider for more information about your plan of care. Introducing [facility]! Dear Lary Weller: 
Thank you for requesting a [email or web address] account. Our records indicate that you already have an active [email or web address] account. You can access your account anytime at [email or web address] Did you know that you can access your hospital and ER discharge instructions at any time in [email or web address]? You can also review all of your test results from your hospital stay or ER visit. Additional Information If you have questions, please visit the Frequently Asked Questions section of the [email or web address] website at [email or web address] Remember, [email or web address] is NOT to be used for urgent needs. For medical emergencies, dial 911. Now available from your iPhone and Android! General Information Please provide this summary of care documentation to your next provider. Patient Signature:  ____________________________________________________________ Date:  ____________________________________________________________  
  
Christa Jung Provider Signature:  ____________________________________________________________ Date:  ____________________________________________________________

## 2017-04-05 NOTE — PERIOP NOTES
Handoff Report from Operating Room to PACU    Report received from Sheridan Munoz RN and Charles Aranda CRNA regarding [employer]. Surgeon(s):  Emma Subramanian MD  And Procedure(s) (LRB):  RIGHT FEMUR BIOPSY (Right)  confirmed   with dressings discussed. Anesthesia type, drugs, patient history, complications, estimated blood loss, vital signs, intake and output, and last pain medication, lines, reversal medications and temperature were reviewed.

## 2017-04-05 NOTE — PROGRESS NOTES
Order received and acknowledged. Pt currently in the OR, will hold at this time and continue to follow. Thank you.   Melissa Kirkpatrick, PT, DPT

## 2017-04-05 NOTE — ROUTINE PROCESS
Bedside and Verbal shift change report given to Yolie Escobedo RN (oncoming nurse) by Agueda Reyna RN (offgoing nurse). Report included the following information SBAR, Kardex, OR Summary, Procedure Summary, Intake/Output, MAR, Accordion, Recent Results and Med Rec Status.

## 2017-04-05 NOTE — BRIEF OP NOTE
BRIEF OPERATIVE NOTE    Date of Procedure: 4/5/2017   Preoperative Diagnosis: Right Femur Fracture  Postoperative Diagnosis: Right Femur Fracture    Procedure(s):  RIGHT FEMUR BIOPSY  Surgeon(s) and Role:     * Lynnette Puente MD - Primary            Surgical Staff:  Circ-1: Polina Marie  Radiology Technician: RT Nirmala  Scrub Tech-1: Kodak Thorne  Surg Asst-1: [facility]  Event Time In   Incision Start 0803   Incision Close 0811     Anesthesia: General   Estimated Blood Loss: min  Specimens:   ID Type Source Tests Collected by Time Destination   1 : Right Femur Biopsy Frozen Section Leg, Right  Lynnette Puente MD 4/5/2017 2161 Pathology      Findings: pathologic fx    Complications: none  Implants: * No implants in log *

## 2017-04-05 NOTE — ANESTHESIA POSTPROCEDURE EVALUATION
Post-Anesthesia Evaluation and Assessment    Patient: Yancy Khan MRN: [id number]  SSN: [id number]    YOB: 1960  Age: 61 y.o. Sex: female       Cardiovascular Function/Vital Signs  Visit Vitals    /59    Pulse 88    Temp 36.9 °C (98.5 °F)    Resp 17    Ht 5' 2\" (1.575 m)    Wt 72 kg (158 lb 11.7 oz)    SpO2 100%    Breastfeeding No    BMI 29.03 kg/m2       Patient is status post general anesthesia for Procedure(s):  RIGHT FEMUR BIOPSY. Nausea/Vomiting: None    Postoperative hydration reviewed and adequate. Pain:  Pain Scale 1: Numeric (0 - 10) (04/05/17 0900)  Pain Intensity 1: 2 (04/05/17 0900)   Managed    Neurological Status:   Neuro (WDL): Exceptions to WDL (04/05/17 0831)  Neuro  Neurologic State: Drowsy; Eyes open spontaneously (04/05/17 0831)  Orientation Level: Oriented to person;Oriented to place;Oriented to situation (04/05/17 0831)  Cognition: Follows commands (04/05/17 0831)  Speech: Clear (04/05/17 0831)  LUE Motor Response: Purposeful (04/05/17 0831)  LLE Motor Response: Purposeful (04/05/17 0831)  RUE Motor Response: Purposeful (04/05/17 0831)  RLE Motor Response: Purposeful (04/05/17 0831)   At baseline    Mental Status and Level of Consciousness: Arousable    Pulmonary Status:   O2 Device: Nasal cannula (04/05/17 0900)   Adequate oxygenation and airway patent    Complications related to anesthesia: None    Post-anesthesia assessment completed.  No concerns    Signed By: Eyad Lopez MD     April 5, 2017

## 2017-04-05 NOTE — PROGRESS NOTES
Bedside and Verbal shift change report given to Shayna Prkacientharis Alicia  (oncoming nurse) by William  RN (offgoing nurse). Report included the following information SBAR, Kardex, Intake/Output, MAR and Recent Results.

## 2017-04-05 NOTE — PERIOP NOTES
TRANSFER - OUT REPORT:    Verbal report given to Jasiel Donnelly RN on [employer]  being transferred to [facility] 823 for routine post - op       Report consisted of patients Situation, Background, Assessment and   Recommendations(SBAR). Information from the following report(s) OR Summary, Procedure Summary, Intake/Output and MAR was reviewed with the receiving nurse. Opportunity for questions and clarification was provided.       Patient transported with:   O2 @ 2 liters  Tech

## 2017-04-05 NOTE — PROGRESS NOTES
Hematology Oncology Progress Note    Follow up for: Lung ca    Chart notes reviewed since last visit.     Case discussed with following:     Patient complains of the following:patient had biopsy today    Additional concerns noted by the staff:     Patient Vitals for the past 24 hrs:   BP Temp Pulse Resp SpO2 Height Weight   04/05/17 0950 127/75 98.2 °F (36.8 °C) 85 17 99 % - -   04/05/17 0930 111/63 98.4 °F (36.9 °C) 85 17 98 % - -   04/05/17 0915 115/66 - 86 12 100 % - -   04/05/17 0900 107/59 - 88 17 100 % - -   04/05/17 0855 110/57 - 89 17 100 % - -   04/05/17 0850 108/60 - 86 11 100 % - -   04/05/17 0845 117/64 - 91 20 100 % - -   04/05/17 0840 117/66 - 90 12 100 % - -   04/05/17 0835 125/72 98.5 °F (36.9 °C) 89 12 100 % - -   04/05/17 0831 131/73 98.5 °F (36.9 °C) 89 12 100 % - -   04/05/17 0620 - 98.9 °F (37.2 °C) 84 13 92 % 5' 2\" (1.575 m) 72 kg (158 lb 11.7 oz)   04/05/17 0416 109/57 97.7 °F (36.5 °C) 74 18 99 % - -   04/05/17 0002 113/61 97.5 °F (36.4 °C) 74 16 94 % - -   04/04/17 2014 110/69 97.7 °F (36.5 °C) 75 20 95 % - -   04/04/17 1539 119/69 97.9 °F (36.6 °C) 87 16 97 % - -   04/04/17 1117 120/73 98 °F (36.7 °C) 86 17 94 % - -       Review of Systems:  12 point ROS done and negative except as above    Physical Examination:  Gen NAD  CV reg  Lungs clear  abd benign    Labs:  Recent Results (from the past 24 hour(s))   METABOLIC PANEL, COMPREHENSIVE    Collection Time: 04/05/17  2:42 AM   Result Value Ref Range    Sodium 143 136 - 145 mmol/L    Potassium 4.5 3.5 - 5.1 mmol/L    Chloride 108 97 - 108 mmol/L    CO2 27 21 - 32 mmol/L    Anion gap 8 5 - 15 mmol/L    Glucose 136 (H) 65 - 100 mg/dL    BUN 21 (H) 6 - 20 MG/DL    Creatinine 0.94 0.55 - 1.02 MG/DL    BUN/Creatinine ratio 22 (H) 12 - 20      GFR est AA >60 >60 ml/min/1.73m2    GFR est non-AA >60 >60 ml/min/1.73m2    Calcium 8.2 (L) 8.5 - 10.1 MG/DL    Bilirubin, total 0.3 0.2 - 1.0 MG/DL    ALT (SGPT) 15 12 - 78 U/L    AST (SGOT) 10 (L) 15 - 37 U/L    Alk. phosphatase 56 45 - 117 U/L    Protein, total 6.3 (L) 6.4 - 8.2 g/dL    Albumin 2.6 (L) 3.5 - 5.0 g/dL    Globulin 3.7 2.0 - 4.0 g/dL    A-G Ratio 0.7 (L) 1.1 - 2.2     CBC WITH AUTOMATED DIFF    Collection Time: 04/05/17  2:42 AM   Result Value Ref Range    WBC 8.2 3.6 - 11.0 K/uL    RBC 2.98 (L) 3.80 - 5.20 M/uL    HGB 9.5 (L) 11.5 - 16.0 g/dL    HCT 29.4 (L) 35.0 - 47.0 %    MCV 98.7 80.0 - 99.0 FL    MCH 31.9 26.0 - 34.0 PG    MCHC 32.3 30.0 - 36.5 g/dL    RDW 17.2 (H) 11.5 - 14.5 %    PLATELET 037 949 - 385 K/uL    NEUTROPHILS 67 32 - 75 %    LYMPHOCYTES 18 12 - 49 %    MONOCYTES 14 (H) 5 - 13 %    EOSINOPHILS 1 0 - 7 %    BASOPHILS 0 0 - 1 %    ABS. NEUTROPHILS 5.6 1.8 - 8.0 K/UL    ABS. LYMPHOCYTES 1.5 0.8 - 3.5 K/UL    ABS. MONOCYTES 1.2 (H) 0.0 - 1.0 K/UL    ABS. EOSINOPHILS 0.0 0.0 - 0.4 K/UL    ABS. BASOPHILS 0.0 0.0 - 0.1 K/UL       Imaging:  Bone scan  IMPRESSION: Uptake in the right femur where there is a known fracture. Uptake  right lateral rib, likely fracture. No evidence of metastatic disease.     Assessment and Plan:   Lung Ca  -has been getting adjuvant chemo after surgery  -on hold for now    Femur fx  -bone scan shows no clear mets  -had biopsy this am  -hopefully surgery to fix it soon    PE  -put back on anticoagulation as soon as cleared by surgery, either lovenox if more surgery planned or back on her xarelto if no more procedures planned

## 2017-04-05 NOTE — ANESTHESIA PREPROCEDURE EVALUATION
Anesthetic History   No history of anesthetic complications            Review of Systems / Medical History  Patient summary reviewed, nursing notes reviewed and pertinent labs reviewed    Pulmonary  Within defined limits              Comments: 11/16 RLL ca, on chemothx   Neuro/Psych   Within defined limits           Cardiovascular  Within defined limits                Exercise tolerance: >4 METS  Comments: DVT 2/17   GI/Hepatic/Renal  Within defined limits              Endo/Other  Within defined limits           Other Findings   Comments: pathol fx of femur           Physical Exam    Airway  Mallampati: II  TM Distance: > 6 cm  Neck ROM: normal range of motion   Mouth opening: Normal     Cardiovascular  Regular rate and rhythm,  S1 and S2 normal,  no murmur, click, rub, or gallop             Dental  No notable dental hx       Pulmonary  Breath sounds clear to auscultation               Abdominal  GI exam deferred       Other Findings            Anesthetic Plan    ASA: 3  Anesthesia type: general          Induction: Intravenous  Anesthetic plan and risks discussed with: Patient

## 2017-04-05 NOTE — ROUTINE PROCESS
P. Refused skin prep this am was able to wipe down front and back pt. Refused to turn states she is in too much pain pt.  Was medicated before the skin prep attempt

## 2017-04-05 NOTE — PROGRESS NOTES
TRANSFER - IN REPORT:    Verbal report received from Hadley Bose RN(name) on [employer]  being received from [employer]) for routine post - op      Report consisted of patients Situation, Background, Assessment and   Recommendations(SBAR). Information from the following report(s) SBAR, Kardex, Intake/Output and MAR was reviewed with the receiving nurse. Opportunity for questions and clarification was provided. Assessment completed upon patients arrival to unit and care assumed.

## 2017-04-05 NOTE — CONSULTS
Palliative Medicine Consult  aDnis: [phone] ([id number])    Patient Name: Dariana Wyatt  YOB: 1960    Date of Initial Consult: 4/5/2017  Reason for Consult: Pain management  Requesting Provider: Dr. Cindi Guadarrama  Primary Care Physician: Ike Johnson MD      SUMMARY:   Dariana Wyatt is a 61 y.o. with a past history of HTN, anxiety, depression, PE, intention tremor, right lower lobe lung cancer s/p resection at [facility] 11/16, followed by Dr. Micki Hancock at [facility], received 3 cycles of Cisplat and Alimta was supposed to start 4th cycle this week, who was admitted on 4/4/2017 from home with a diagnosis of distal femur fracture with no associated reported trauma, appears to be pathological in nature. Had bone biopsy done today. Results pending. Bone scan negative for mets. For femur fixation tomorrow 4/6. Patient complained of right hip pain heard a \"pop\" noise then could not walk and had increased pain in right leg and hip. Current medical issues leading to Palliative Medicine involvement include: right hip and leg pain with spasms, right distal femur fracture, lung cancer, debility and weakness. Psychosocial: Lives by self. Not  only child daughterLaura Aviles- [phone]. She is patient's MPOA. Asked to bring in AMD paperwork. She works for [employer]. PALLIATIVE DIAGNOSES:   1. Right hip and Right femur pain and spasms  2. Lung cancer  3. Debility  4. Weakness       PLAN:   1. Introduced myself and palliative care to patient and her daughter at bedside. Patient awake and alert. Oriented X4. Patient complains of right hip pain with spasms especially with any type of movement. She is unable to walk. Had bone biopsy done today to have fixation of femur tomorrow. Rating pain 5/10 with no movement and 10/10 with movement. On Dilaudid PCA. Changed to 0.3 dose with 6 minute delay and 4 hour limit.   Patient used 5.59 mg with 17 attempts sand 16 deliveries in last 24 hours, last 8 hours used- 1.2 mg with 4 attempts and 4 deliveries. Added baclofen RTC and percocet PRN. Encouraged her to ask for percocet when having break through pain. 2. Opioid induced constipation- last BM Monday. Started on senna w/colace. 3. Educated patient and daughter on all medications. 4. Patient anxious about results of biopsy. Was told that it is likely bone mets. 5. Initial consult note routed to primary continuity provider  6. Communicated plan of care with: Palliative IDT       GOALS OF CARE / TREATMENT PREFERENCES:   [====Goals of Care====]  GOALS OF CARE:  Patient / health care proxy stated goals: \"to stop the pain get better and go home\"      TREATMENT PREFERENCES:   Code Status: Full Code    Advance Care Planning:  Advance Care Planning 4/5/2017   Patient's Healthcare Decision Maker is: Legal Next of Britt 68   Primary Decision Maker Name Homero Bah   Primary Decision Maker Phone Number [phone]   Primary Decision Maker Relationship to Patient Adult child   Confirm Advance Directive Yes, not on file       Other:    The palliative care team has discussed with patient / health care proxy about goals of care / treatment preferences for patient.  [====Goals of Care====]         HISTORY:     History obtained from: chart, patient and daughter    CHIEF COMPLAINT: right hip pain and right leg pain, having occasional spasms    HPI/SUBJECTIVE:    The patient is:   [x] Verbal and participatory  [] Non-participatory due to:   Ms. Obdulio Cloud is a 61year old female with lung cancer presents with pathological fracture to right femur.     Clinical Pain Assessment (nonverbal scale for severity on nonverbal patients):   [++++ Clinical Pain Assessment++++]  [++++Pain Severity++++]: Pain: 5  [++++Pain Character++++]: sharp  [++++Pain Duration++++]: since 4/3  [++++Pain Effect++++]: unable to ambulate, very painful to move  [++++Pain Factors++++]: pathological fracture  [++++Pain Frequency++++]: constant, worse with movement  [++++Pain Location++++]: right hip and right femur  [++++ Clinical Pain Assessment++++]     FUNCTIONAL ASSESSMENT:     Palliative Performance Scale (PPS):  PPS: 60       PSYCHOSOCIAL/SPIRITUAL SCREENING:     Advance Care Planning:  Advance Care Planning 4/5/2017   Patient's Healthcare Decision Maker is: Legal Next of Britt 68   Primary Decision Maker Name Marisol Maki   Primary Decision Maker Phone Number [phone]   Primary Decision Maker Relationship to Patient Adult child   Confirm Advance Directive Yes, not on file        Any spiritual / Religion concerns:  [] Yes /  [x] No    Caregiver Burnout:  [] Yes /  [x] No /  [] No Caregiver Present      Anticipatory grief assessment:   [x] Normal  / [] Maladaptive       ESAS Anxiety: Anxiety: 1    ESAS Depression: Depression: 0        REVIEW OF SYSTEMS:     Positive and pertinent negative findings in ROS are noted above in HPI. The following systems were [x] reviewed / [] unable to be reviewed as noted in HPI  Other findings are noted below. Systems: constitutional, ears/nose/mouth/throat, respiratory, gastrointestinal, genitourinary, musculoskeletal, integumentary, neurologic, psychiatric, endocrine. Positive findings noted below. Modified ESAS Completed by: provider   Fatigue: 1 Drowsiness: 0   Depression: 0 Pain: 5   Anxiety: 1 Nausea: 0   Anorexia: 0 Dyspnea: 0     Constipation: No              PHYSICAL EXAM:     From RN flowsheet:  Wt Readings from Last 3 Encounters:   04/05/17 158 lb 11.7 oz (72 kg)   03/21/17 171 lb 12.8 oz (77.9 kg)   12/29/16 180 lb (81.6 kg)     Blood pressure 117/67, pulse 86, temperature 98 °F (36.7 °C), resp. rate 18, height 5' 2\" (1.575 m), weight 158 lb 11.7 oz (72 kg), SpO2 94 %, not currently breastfeeding.     Pain Scale 1: Numeric (0 - 10)  Pain Intensity 1: 2     Pain Location 1: Leg, Hip, Back  Pain Orientation 1: Right  Pain Description 1: Aching  Pain Intervention(s) 1: Medication (see MAR) (given by CRNA)  Last bowel movement, if known:     Constitutional: Middle aged female laying in bed with splint on right leg in NAD  Eyes: pupils equal, anicteric  ENMT: no nasal discharge, moist mucous membranes  Cardiovascular: regular rhythm, distal pulses intact  Respiratory: breathing not labored, symmetric  Gastrointestinal: soft non-tender, +bowel sounds  Musculoskeletal: no deformity, tenderness to palpation of right leg  Skin: warm, dry  Neurologic: following commands, moving all extremities except right leg  Psychiatric: full affect, no hallucinations  Other:       HISTORY:     Active Problems:    Femur fracture, right ([facility] (4/4/2017)      Past Medical History:   Diagnosis Date    Cancer ([facility] [address]     BCC, lung cancer    Depression     History of screening mammography 3/27/2012    Ill-defined condition     fx femur    Intention tremor     Pap smear for cervical cancer screening 3/27/2012    Stress incontinence     Tobacco abuse       Past Surgical History:   Procedure Laterality Date    ENDOSCOPY, COLON, DIAGNOSTIC  2006    HX BREAST BIOPSY Left 09/23/2014    HX ORTHOPAEDIC      NE COLSC FLX W/REMOVAL LESION BY HOT BX FORCEPS  1/4/2013           Family History   Problem Relation Age of Onset    Ovarian Cancer Mother     Elevated Lipids Mother     Clotting Disorder Mother     Cancer Mother      ovarian    Colon Cancer Father 48    Cancer Father 48     colon      History reviewed, no pertinent family history.   Social History   Substance Use Topics    Smoking status: Former Smoker     Packs/day: 1.00     Years: 31.00     Quit date: 12/1/2009    Smokeless tobacco: Never Used    Alcohol use No     Allergies   Allergen Reactions    Tetracycline Hives     Palms and soles    Lortab [Hydrocodone-Acetaminophen] Itching      Current Facility-Administered Medications   Medication Dose Route Frequency    cyclobenzaprine (FLEXERIL) tablet 10 mg  10 mg Oral TID PRN    baclofen (LIORESAL) tablet 5 mg  5 mg Oral TID    oxyCODONE-acetaminophen (PERCOCET 10)  mg per tablet 1 Tab  1 Tab Oral Q4H PRN    [START ON 4/6/2017] senna-docusate (PERICOLACE) 8.6-50 mg per tablet 2 Tab  2 Tab Oral DAILY    [START ON 4/6/2017] polyethylene glycol (MIRALAX) packet 17 g  17 g Oral DAILY    famotidine (PEPCID) tablet 20 mg  20 mg Oral BID    sodium chloride (NS) flush 5-10 mL  5-10 mL IntraVENous Q8H    sodium chloride (NS) flush 5-10 mL  5-10 mL IntraVENous PRN    naloxone (NARCAN) injection 0.4 mg  0.4 mg IntraVENous PRN    ondansetron (ZOFRAN) injection 4 mg  4 mg IntraVENous Q4H PRN    bisacodyl (DULCOLAX) suppository 10 mg  10 mg Rectal DAILY PRN    HYDROmorphone (PF) 15 mg/30 ml (DILAUDID) PCA   IntraVENous CONTINUOUS    escitalopram oxalate (LEXAPRO) tablet 10 mg  10 mg Oral DAILY    buPROPion XL (WELLBUTRIN XL) tablet 300 mg  300 mg Oral 7am    oxybutynin chloride XL (DITROPAN XL) tablet 5 mg  5 mg Oral DAILY    pravastatin (PRAVACHOL) tablet 40 mg  40 mg Oral QHS    HYDROmorphone (PF) (DILAUDID) injection 0.5-1 mg  0.5-1 mg IntraVENous Q3H PRN    labetalol (NORMODYNE;TRANDATE) injection 20 mg  20 mg IntraVENous Q3H PRN          LAB AND IMAGING FINDINGS:     Lab Results   Component Value Date/Time    WBC 8.2 04/05/2017 02:42 AM    HGB 9.5 04/05/2017 02:42 AM    PLATELET 448 70/13/5660 02:42 AM     Lab Results   Component Value Date/Time    Sodium 143 04/05/2017 02:42 AM    Potassium 4.5 04/05/2017 02:42 AM    Chloride 108 04/05/2017 02:42 AM    CO2 27 04/05/2017 02:42 AM    BUN 21 04/05/2017 02:42 AM    Creatinine 0.94 04/05/2017 02:42 AM    Calcium 8.2 04/05/2017 02:42 AM      Lab Results   Component Value Date/Time    AST (SGOT) 10 04/05/2017 02:42 AM    Alk.  phosphatase 56 04/05/2017 02:42 AM    Protein, total 6.3 04/05/2017 02:42 AM    Albumin 2.6 04/05/2017 02:42 AM    Globulin 3.7 04/05/2017 02:42 AM     Lab Results   Component Value Date/Time    INR 1.2 04/04/2017 01:24 AM    Prothrombin time 11.8 04/04/2017 01:24 AM    aPTT 27.3 04/04/2017 01:24 AM      No results found for: IRON, FE, TIBC, IBCT, PSAT, FERR   No results found for: PH, PCO2, PO2  No components found for: GLPOC   No results found for: CPK, CKMB             Total time: 70 minutes  Counseling / coordination time: 50 minutes  > 50% counseling / coordination?: yes    Prolonged service was provided for  []30 min   []75 min in face to face time in the presence of the patient. Time Start:   Time End:   Note: this can only be billed with 77418 (initial) or 85692 (follow up). If multiple start / stop times, list each separately.

## 2017-04-05 NOTE — PROGRESS NOTES
OT note:  Order received and acknowledged. Pt currently in the OR, will hold at this time and continue to follow.  Thank you.

## 2017-04-05 NOTE — PROGRESS NOTES
Orthopedic End of Shift Note    Bedside and Verbal shift change report given to [facility] (oncoming nurse) by Linda Santana RN (offgoing nurse). Report included the following information SBAR, Kardex, Intake/Output and MAR. POD#     Significant issues during shift: none    Issues for Physician to addressnone    Nausea/Vomiting [] yes [x] no     Ferris Catheter [x] in [] removed    Bowel Movements [] yes [x] no     Foot Pumps or SCD [] yes [x] no    Ice Pack [] yes    [x] no    Incentive Spirometer [] yes [x] no Volume:   0   Supplemental O2 [] yes [x] no Sat off O2:  98%     Patient Vitals for the past 12 hrs:   Temp Pulse Resp BP SpO2   04/05/17 1456 98 °F (36.7 °C) 86 18 117/67 94 %   04/05/17 1350 98 °F (36.7 °C) 91 18 124/81 94 %   04/05/17 1245 98 °F (36.7 °C) 86 16 121/67 98 %   04/05/17 1153 98 °F (36.7 °C) 88 16 115/60 98 %   04/05/17 1050 98 °F (36.7 °C) 85 18 116/70 94 %   04/05/17 0950 98.2 °F (36.8 °C) 85 17 127/75 99 %   04/05/17 0930 98.4 °F (36.9 °C) 85 17 111/63 98 %   04/05/17 0915 - 86 12 115/66 100 %   04/05/17 0900 - 88 17 107/59 100 %   04/05/17 0855 - 89 17 110/57 100 %   04/05/17 0850 - 86 11 108/60 100 %   04/05/17 0845 - 91 20 117/64 100 %   04/05/17 0840 - 90 12 117/66 100 %   04/05/17 0835 98.5 °F (36.9 °C) 89 12 125/72 100 %   04/05/17 0831 98.5 °F (36.9 °C) 89 12 131/73 100 %     Lab Results   Component Value Date/Time    HGB 9.5 04/05/2017 02:42 AM    HCT 29.4 04/05/2017 02:42 AM     Lab Results   Component Value Date/Time    INR 1.2 04/04/2017 01:24 AM       Intake/Output Summary (Last 24 hours) at 04/05/17 1948  Last data filed at 04/05/17 1934   Gross per 24 hour   Intake             1040 ml   Output              500 ml   Net              540 ml     Wound Leg Right;Upper (Active)   DRESSING STATUS Clean, dry, and intact 4/5/2017  2:59 PM   DRESSING TYPE Special tape (comment) 4/5/2017  2:59 PM   SPLINT TYPE/MATERIAL Knee immobilizer 4/5/2017  2:59 PM   Incision site well approximated? Yes 4/5/2017  8:15 AM   Number of days:0            Peripheral Intravenous Line:  Peripheral IV 04/04/17 Left Antecubital (Active)   Site Assessment Clean, dry, & intact 4/5/2017  2:59 PM   Phlebitis Assessment 0 4/5/2017  2:59 PM   Infiltration Assessment 0 4/5/2017  2:59 PM   Dressing Status Clean, dry, & intact 4/5/2017  2:59 PM   Dressing Type Tape;Transparent 4/5/2017  2:59 PM   Hub Color/Line Status Green 4/5/2017  2:59 PM     Drain(s):

## 2017-04-05 NOTE — PROGRESS NOTES
Hospitalist Progress Note    NAME: Evelio Raymond   :  1960   MRN:  [id number]       Interim Hospital Summary: 61 y.o. female whom presented on 2017 with      Assessment / Plan:  Femur fracture, right ([address] (2017), likely pathologic POA  - unable to ambulate with acute severe pain after a 'popping sound\". No known trauma  -CT showed distal R femur fracture etiology unclear  -s/p R femur biopsy, with further surgery plan in the AM  -will give one dose of lovenox tonight due to recent PE. Hold further OAc  -IV dilaudid, PCA, wean as tolerated      Recent pulmonary embolism 1 month ago POA diagnosed at [facility]  -currently on Xarelto prior to admission. -will give one dose of lovenox once. Hold further OAc due to surgery in the AM  -resume xarelto at discharge     Lung cancer RLL POA s/p resection [facility]  -currently receiving adjuvant chemo after surgery at [address] with Dr Neema Da Silva  -no prior mets, body PET scan (no LE extremities) in 2016 was negative. Bone scan neg for mets  -Oncology following, appreciate recs     Elevated BP with diagnosis of HTN POA  -suspect related to pain  -pain controlled  -PRN labetalol     Anxiety/depression POA  -continue lexapro and wellbutrin      Hyperlipidemia POA  -continue statin     DVT prophylaxis: lovenox     Stress ulcer prophylaxis: Not indicated     Code status: Full code     Subjective:     Chief Complaint / Reason for Physician Visit  Pt seen after R femur biopsy. Appears stable. Friend and daughter at bedside. States PCA is not helping with her pain. She is anxious about surgery tomorrow. Discussed with RN events overnight.      Review of Systems:  Symptom Y/N Comments  Symptom Y/N Comments   Fever/Chills    Chest Pain     Poor Appetite    Edema     Cough    Abdominal Pain     Sputum    Joint Pain     SOB/LEE    Pruritis/Rash     Nausea/vomit    Tolerating PT/OT     Diarrhea    Tolerating Diet Constipation    Other       Could NOT obtain due to:      Objective:     VITALS:   Last 24hrs VS reviewed since prior progress note. Most recent are:  Patient Vitals for the past 24 hrs:   Temp Pulse Resp BP SpO2   04/05/17 1050 98 °F (36.7 °C) 85 18 116/70 94 %   04/05/17 0950 98.2 °F (36.8 °C) 85 17 127/75 99 %   04/05/17 0930 98.4 °F (36.9 °C) 85 17 111/63 98 %   04/05/17 0915 - 86 12 115/66 100 %   04/05/17 0900 - 88 17 107/59 100 %   04/05/17 0855 - 89 17 110/57 100 %   04/05/17 0850 - 86 11 108/60 100 %   04/05/17 0845 - 91 20 117/64 100 %   04/05/17 0840 - 90 12 117/66 100 %   04/05/17 0835 98.5 °F (36.9 °C) 89 12 125/72 100 %   04/05/17 0831 98.5 °F (36.9 °C) 89 12 131/73 100 %   04/05/17 0620 98.9 °F (37.2 °C) 84 13 - 92 %   04/05/17 0416 97.7 °F (36.5 °C) 74 18 109/57 99 %   04/05/17 0002 97.5 °F (36.4 °C) 74 16 113/61 94 %   04/04/17 2014 97.7 °F (36.5 °C) 75 20 110/69 95 %   04/04/17 1539 97.9 °F (36.6 °C) 87 16 119/69 97 %       Intake/Output Summary (Last 24 hours) at 04/05/17 1350  Last data filed at 04/05/17 0814   Gross per 24 hour   Intake             1040 ml   Output              100 ml   Net              940 ml        PHYSICAL EXAM:  General: WD, WN. Alert, cooperative, no acute distress    EENT:  EOMI. Anicteric sclerae. MMM  Resp:  CTA bilaterally, no wheezing or rales. No accessory muscle use  CV:  Regular  rhythm,  No edema  GI:  Soft, Non distended, Non tender.  +Bowel sounds  Neurologic:  Alert and oriented X 3, normal speech,   Psych:   Good insight. Not anxious nor agitated  Skin:  No rashes.   No jaundice    Reviewed most current lab test results and cultures  YES  Reviewed most current radiology test results   YES  Review and summation of old records today    NO  Reviewed patient's current orders and MAR    YES  PMH/SH reviewed - no change compared to H&P  ________________________________________________________________________  Care Plan discussed with:    Comments   Patient x Family  x    RN x    Care Manager     Consultant  x Oncology and ortho                     Multidiciplinary team rounds were held today with , nursing, pharmacist and clinical coordinator. Patient's plan of care was discussed; medications were reviewed and discharge planning was addressed. ________________________________________________________________________  Total NON critical care TIME:  45   Minutes    Total CRITICAL CARE TIME Spent:   Minutes non procedure based      Comments   >50% of visit spent in counseling and coordination of care     ________________________________________________________________________  Laci Galeas MD     Procedures: see electronic medical records for all procedures/Xrays and details which were not copied into this note but were reviewed prior to creation of Plan. LABS:  I reviewed today's most current labs and imaging studies.   Pertinent labs include:  Recent Labs      04/05/17 0242 04/04/17   0124   WBC  8.2  6.1   HGB  9.5*  10.8*   HCT  29.4*  32.8*   PLT  283  303     Recent Labs      04/05/17 0242 04/04/17   0124   NA  143  143   K  4.5  3.9   CL  108  106   CO2  27  23   GLU  136*  153*   BUN  21*  20   CREA  0.94  1.18*   CA  8.2*  8.6   ALB  2.6*  3.1*   TBILI  0.3  0.3   SGOT  10*  14*   ALT  15  21   INR   --   1.2*       Signed: Laci Galeas MD

## 2017-04-06 NOTE — PERIOP NOTES
1830: Patient complaining of pain. Dr. Liban Sheets notified. Dr. Liban Sheets to bedside, MD administered 3 mg Methadone IV. Stated to call if patient continues to have pain to reevaluate. 1905: Patient starting to complain of pain. Dr. Liban Sheets notified via telephone, MD ordered to begin administering Fentanyl per PACU order- SEE MAR.    1925: Dr. Liban Sheets back to bedside to assess patients pain. MD administered 2 mg Methadone IV. Ordered to removed 500 mcgs Fentanyl IV from pyxis. 1928: Dr. Liban Sheets remains at bedside, MD administered 100 mcg of the 500 mcgs) Fentanyl IV.     1930: Patient appears much more comfortable. RR 10, patient nodding off. MD ordered Tylenol 1G IV, and Toradol 30 mg IV (if ok with surgeon- surgeon paged). Dr. Liban Sheets would like patient to remain on 2L NC overnight, and continuous pulse ox monitoring-orders placed. 1935: Tylenol 1G IV started. Ortho PA Chloe Heck returned call- ok to administer Toradol. Dr. Liban Sheets OK for patient to return to room once Tylenol and Toradol administered.

## 2017-04-06 NOTE — PROGRESS NOTES
OT note:  Pt scheduled for surgery today. Will hold until after surgery and new orders/ weight bearing status.  Thank you.

## 2017-04-06 NOTE — PERIOP NOTES
Transferred to room 3223 until OR is ready for patient. SBAR given to RABT. Patient notified. [facility] received form Keila Brown on patient returning to [facility] for scheduled procedure.

## 2017-04-06 NOTE — ANESTHESIA POSTPROCEDURE EVALUATION
Post-Anesthesia Evaluation and Assessment    Patient: [employer] MRN: [id number]  SSN: [id number]    YOB: 1960  Age: 61 y.o. Sex: female       Cardiovascular Function/Vital Signs  Visit Vitals    BP (!) 142/92    Pulse 84    Temp 36.3 °C (97.4 °F)    Resp 20    Ht 5' 2\" (1.575 m)    Wt 72 kg (158 lb 11.7 oz)    SpO2 100%    Breastfeeding No    BMI 29.03 kg/m2       Patient is status post general anesthesia for Procedure(s): FEMUR INTRA MEDULLARY NAILING RETROGRADE SUPRACHONDYLAR- RIGHT FEMUR. Nausea/Vomiting: None    Postoperative hydration reviewed and adequate. Pain:  Pain Scale 1: Numeric (0 - 10) (04/06/17 1513)  Pain Intensity 1: 6 (04/06/17 1513)   Managed    Neurological Status:   Neuro (WDL): Exceptions to WDL (04/06/17 0913)  Neuro  Neurologic State: Alert (04/06/17 1830)  Orientation Level: Oriented X4 (04/06/17 0913)  Cognition: Appropriate decision making; Appropriate for age attention/concentration; Appropriate safety awareness (04/06/17 0913)  Speech: Clear (04/06/17 0913)  LUE Motor Response: Purposeful (04/06/17 0913)  LLE Motor Response: Purposeful (04/06/17 0913)  RUE Motor Response: Purposeful (04/06/17 0913)  RLE Motor Response: Weak (04/06/17 0913)   At baseline    Mental Status and Level of Consciousness: Arousable    Pulmonary Status:   O2 Device: Nasal cannula (04/06/17 1805)   Adequate oxygenation and airway patent    Complications related to anesthesia: None    Post-anesthesia assessment completed. No concerns    Signed By: Ceasar Tolbert MD     April 6, 2017           Patient narcotic requirement are high at this time. She has received 10mg IV methadone and will be dc'd to the floor with continuous pulse ox and NC O2.

## 2017-04-06 NOTE — PROGRESS NOTES
Hospitalist Progress Note    NAME: Julian Marin   :  1960   MRN:  [id number]       Interim Hospital Summary: 61 y.o. female whom presented on 2017 with      Assessment / Plan:  Femur fracture, right ([address] (2017), likely pathologic POA  - unable to ambulate with acute severe pain after a 'popping sound\". No known trauma  -CT showed distal R femur fracture etiology unclear  -s/p R femur biopsy on , scheduled for surgery today   -PCA and IV dilaudid prn with bowel regimen     Recent pulmonary embolism 1 month ago POA diagnosed at [facility]  -currently on Xarelto prior to admission. -plan to resume xarelto post op if cleared by ortho     Lung cancer RLL POA s/p resection at [facility] doctors 2016  -currently receiving adjuvant chemo after surgery at [address] with [address] Golden Garcia  -no prior mets, body PET scan (no LE extremities) in 2016 was negative. Bone scan neg for mets  -Oncology following, appreciate recs     Elevated BP with diagnosis of HTN POA  -suspect related to pain  -pain controlled  -PRN labetalol     Anxiety/depression POA  -continue lexapro and wellbutrin      Hyperlipidemia POA  -continue statin     DVT prophylaxis: lovenox     Stress ulcer prophylaxis: Not indicated     Code status: Full code     Subjective:     Chief Complaint / Reason for Physician Visit  No acute complaints. Anxious for upcoming surgery   Discussed with RN events overnight. Review of Systems:  Symptom Y/N Comments  Symptom Y/N Comments   Fever/Chills n   Chest Pain n    Poor Appetite n   Edema n    Cough n   Abdominal Pain n    Sputum n   Joint Pain y    SOB/LEE n   Pruritis/Rash     Nausea/vomit    Tolerating PT/OT     Diarrhea    Tolerating Diet     Constipation    Other       Could NOT obtain due to:      Objective:     VITALS:   Last 24hrs VS reviewed since prior progress note.  Most recent are:  Patient Vitals for the past 24 hrs:   Temp Pulse Resp BP SpO2   17 1515 - 76 16 - 98 %   04/06/17 1041 - 74 14 - 94 %   04/06/17 0858 98.4 °F (36.9 °C) 85 17 116/70 96 %   04/06/17 0759 98.1 °F (36.7 °C) 85 18 114/64 91 %   04/06/17 0442 98.5 °F (36.9 °C) 78 18 96/67 99 %   04/05/17 2335 98.4 °F (36.9 °C) 72 18 100/56 99 %   04/05/17 1950 98.6 °F (37 °C) 80 18 113/67 97 %       Intake/Output Summary (Last 24 hours) at 04/06/17 1517  Last data filed at 04/06/17 1323   Gross per 24 hour   Intake                0 ml   Output              900 ml   Net             -900 ml        PHYSICAL EXAM:  General: WD, WN. Alert, cooperative, no acute distress    EENT:  EOMI. Anicteric sclerae. MMM  Resp:  CTA bilaterally, no wheezing or rales. No accessory muscle use  CV:  Regular  rhythm,  No edema  GI:  Soft, Non distended, Non tender.  +Bowel sounds  Neurologic:  Alert and oriented X 3, normal speech,   Psych:   Good insight. Not anxious nor agitated  Skin:  No rashes. No jaundice    Reviewed most current lab test results and cultures  YES  Reviewed most current radiology test results   YES  Review and summation of old records today    NO  Reviewed patient's current orders and MAR    YES  PMH/ reviewed - no change compared to H&P  ________________________________________________________________________  Care Plan discussed with:    Comments   Patient x    Family      RN x    Care Manager     Consultant                        Multidiciplinary team rounds were held today with , nursing, pharmacist and clinical coordinator. Patient's plan of care was discussed; medications were reviewed and discharge planning was addressed.      ________________________________________________________________________  Total NON critical care TIME:  35   Minutes    Total CRITICAL CARE TIME Spent:   Minutes non procedure based      Comments   >50% of visit spent in counseling and coordination of care     ________________________________________________________________________  Radha Jarrett MD     Procedures: see electronic medical records for all procedures/Xrays and details which were not copied into this note but were reviewed prior to creation of Plan. LABS:  I reviewed today's most current labs and imaging studies.   Pertinent labs include:  Recent Labs      04/06/17 0316 04/05/17 0242 04/04/17 0124   WBC  7.6  8.2  6.1   HGB  8.5*  9.5*  10.8*   HCT  27.1*  29.4*  32.8*   PLT  234  283  303     Recent Labs      04/06/17 0316 04/05/17 0242 04/04/17 0124   NA  141  143  143   K  4.4  4.5  3.9   CL  107  108  106   CO2  28  27  23   GLU  90  136*  153*   BUN  21*  21*  20   CREA  0.93  0.94  1.18*   CA  8.4*  8.2*  8.6   ALB   --   2.6*  3.1*   TBILI   --   0.3  0.3   SGOT   --   10*  14*   ALT   --   15  21   INR   --    --   1.2*       Signed: Caridad Domingo MD

## 2017-04-06 NOTE — PROGRESS NOTES
1500- Patient off floor to OR for surgery. Bedside shift change report given to Kaylie Charles (oncoming nurse) by Aaron Gamino (offgoing nurse). Report included the following information SBAR, Kardex, Intake/Output, MAR and Recent Results.

## 2017-04-06 NOTE — PERIOP NOTES
TRANSFER - IN REPORT:    Verbal report received from [address] RN(name) on [employer]  being received from Ortho Room 3223(unit) for ordered procedure      Report consisted of patients Situation, Background, Assessment and   Recommendations(SBAR). Information from the following report(s) SBAR, Kardex, [facility] and Recent Results was reviewed with the receiving nurse. Opportunity for questions and clarification was provided. Assessment completed upon patients arrival to unit and care assumed.

## 2017-04-06 NOTE — ANESTHESIA PREPROCEDURE EVALUATION
Anesthetic History   No history of anesthetic complications            Review of Systems / Medical History  Patient summary reviewed, nursing notes reviewed and pertinent labs reviewed    Pulmonary          Smoker      Comments: 11/16 RLL ca, on chemothx  Former smoker   Neuro/Psych         Psychiatric history     Cardiovascular            Dysrhythmias : PVC  Hyperlipidemia    Exercise tolerance: >4 METS  Comments: DVT 2/17   GI/Hepatic/Renal  Within defined limits              Endo/Other        Anemia     Other Findings   Comments: pathol fx of femur           Physical Exam    Airway  Mallampati: II  TM Distance: > 6 cm  Neck ROM: normal range of motion   Mouth opening: Normal     Cardiovascular  Regular rate and rhythm,  S1 and S2 normal,  no murmur, click, rub, or gallop             Dental  No notable dental hx       Pulmonary  Breath sounds clear to auscultation               Abdominal  GI exam deferred       Other Findings            Anesthetic Plan    ASA: 3  Anesthesia type: general    Monitoring Plan: BIS      Induction: Intravenous  Anesthetic plan and risks discussed with: Patient      No interval changes medically since her surgery/anesthesia yesterday. Beta blocker is a prn order.

## 2017-04-06 NOTE — PROGRESS NOTES
PALLIATIVE MEDICINE      Attempted to meet with pt twice, both times she is off the floor.  Will check back tomorrow am.

## 2017-04-06 NOTE — PROGRESS NOTES
Pressure Ulcer Prevention In basket Alert Received for Yg < 14 (moderate risk).      Suggested Care Plan/Interventions for Nursing  1. Complete Yg Pressure Ulcer Risk Scale and use sub scores to identify appropriate interventions. 2. Perform Assessment: skin, changes in LOC, visual cues for pain, monitor skin under medical devices  3. Respond to Reduced Sensory Perception: changes in LOC, check visual cues for pain, float heels, suspension boots, pressure redistribution bed/mattress/chair cushion, turning and reposition approximately every 2 hours (pillows & wedges), pad between skin to skin, turn & reposition  4. Manage Moisture: absorbent under pads, internal / external urinary device, internal /  external fecal device, minimize layers, contain wound drainage, access need for specialty bed, limit adult briefs, maintain skin hydration (lotion/cream), moisture barrier, offer toileting every hour  5. Promote Activity: increase time out of bed, chair cushion, PT/OT evaluation, trapeze to reposition, pressure redistribution bed/mattress/chair  6. Address Reduced Mobility: float heels / suspension boot, HOB 30 degrees or less, pressure redistribution bed/mattress/cushion, PT / OT evaluation, turn and reposition approximately every 2 hours (pillows & wedges)  7. Promote Nutrition: document food / fluid / supplement intake, encourage/assist with meals as needed  8. Reduce Friction and Shear: transferring/repositioning devices (lift/draw sheet), lift team/ patient mobility team, feet elevated on foot rest, minimize layers, foam dressing / transparent film / skin sealants, protective barrier creams and emollients, transfer aides (board, Behzad lift, ceiling lift, stand assist), HOB 30 degrees or less, trapeze to reposition.   Wound Care Team

## 2017-04-06 NOTE — PERIOP NOTES
10:36= states pain 5/10; VO received from Dr Srini Rich for 100 mcg fentanyl IV.    11:37= pt was questioning \"how much longer before she goes into surgery? \" After speaking with Shannon Ang, Director, there has been a delay due to equipment, so surgery has been delayed. Informed pt of delay. She also stated that she is having discomfort, pain 5/10; attached PCA dilaudid per Dr Srini Rich. 13:39= pt returned to OR Holding by bed to room 19. Pt a bit \"frustrated because my surgery isn't scheduled now until 3PM.\" Asked if I could do anything to make things easier, and she said she had her tablet upstairs in her room. Daughter is also in room. Saniya Oliver RN, and asked her to bring daughter and tablet down to room 19 while pt awaits surgery. 15:05= pt stating pain in right hip/leg; will check with Dr Srini Rich to see if fentanyl IV can be given. 15:09= VO received from Dr Srini Rich for 100 mcg fentanyl IV.

## 2017-04-06 NOTE — PROGRESS NOTES
Pt scheduled for surgery today. Will hold until after surgery and new orders/ weight bearing status. Thank you.   Elis Whitfield, PT, DPT

## 2017-04-07 NOTE — PROGRESS NOTES
Hematology Oncology Progress Note    Follow up for: Lung ca    Chart notes reviewed since last visit.     Case discussed with following:     Patient complains of the following:working with PT, pain under control    Additional concerns noted by the staff:     Patient Vitals for the past 24 hrs:   BP Temp Pulse Resp SpO2   04/07/17 0830 120/77 98.7 °F (37.1 °C) 83 18 98 %   04/07/17 0340 119/70 98.5 °F (36.9 °C) 76 - 97 %   04/07/17 0238 108/75 - 73 - 97 %   04/07/17 0138 110/68 - 69 - 97 %   04/07/17 0013 99/62 - - - -   04/06/17 2338 92/86 98.5 °F (36.9 °C) 69 18 97 %   04/06/17 2238 95/58 - 78 18 95 %   04/06/17 2208 97/88 98.4 °F (36.9 °C) 80 18 96 %   04/06/17 2138 96/59 98.5 °F (36.9 °C) 88 18 93 %   04/06/17 2108 108/75 - 77 18 96 %   04/06/17 2038 98/75 98.2 °F (36.8 °C) 88 18 95 %   04/06/17 2015 119/66 - 79 13 96 %   04/06/17 2000 121/69 - 75 10 96 %   04/06/17 1945 120/63 - 82 9 97 %   04/06/17 1930 127/74 - 84 13 97 %   04/06/17 1915 153/84 - 80 14 98 %   04/06/17 1900 157/88 - 84 18 100 %   04/06/17 1845 (!) 142/92 - 84 20 100 %   04/06/17 1830 152/74 - 86 19 100 %   04/06/17 1825 154/77 - 87 17 100 %   04/06/17 1820 154/74 - 87 18 100 %   04/06/17 1815 144/69 - 88 19 100 %   04/06/17 1810 143/73 - 91 18 99 %   04/06/17 1806 130/81 - - - -   04/06/17 1805 130/81 97.4 °F (36.3 °C) (!) 106 10 97 %   04/06/17 1515 - - 76 16 98 %   04/06/17 1041 - - 74 14 94 %       Review of Systems:  12 point ROS done and negative except as above    Physical Examination:  Gen NAD  CV reg  Lungs clear  abd benign    Labs:  Recent Results (from the past 24 hour(s))   METABOLIC PANEL, BASIC    Collection Time: 04/07/17  4:22 AM   Result Value Ref Range    Sodium 139 136 - 145 mmol/L    Potassium 4.8 3.5 - 5.1 mmol/L    Chloride 105 97 - 108 mmol/L    CO2 25 21 - 32 mmol/L    Anion gap 9 5 - 15 mmol/L    Glucose 129 (H) 65 - 100 mg/dL    BUN 18 6 - 20 MG/DL    Creatinine 0.91 0.55 - 1.02 MG/DL    BUN/Creatinine ratio 20 12 - 20 GFR est AA >60 >60 ml/min/1.73m2    GFR est non-AA >60 >60 ml/min/1.73m2    Calcium 8.4 (L) 8.5 - 10.1 MG/DL   CBC WITH MANUAL DIFF    Collection Time: 04/07/17  4:22 AM   Result Value Ref Range    WBC 8.1 3.6 - 11.0 K/uL    RBC 3.02 (L) 3.80 - 5.20 M/uL    HGB 9.4 (L) 11.5 - 16.0 g/dL    HCT 29.6 (L) 35.0 - 47.0 %    MCV 98.0 80.0 - 99.0 FL    MCH 31.1 26.0 - 34.0 PG    MCHC 31.8 30.0 - 36.5 g/dL    RDW 16.9 (H) 11.5 - 14.5 %    PLATELET 695 991 - 667 K/uL    NEUTROPHILS 76 (H) 32 - 75 %    BAND NEUTROPHILS 0 0 - 6 %    LYMPHOCYTES 16 12 - 49 %    MONOCYTES 8 5 - 13 %    EOSINOPHILS 0 0 - 7 %    BASOPHILS 0 0 - 1 %    METAMYELOCYTES 0 %    MYELOCYTES 0 0 %    PROMYELOCYTES 0 0 %    BLASTS 0 0 %    OTHER CELL 0 0      ABS. NEUTROPHILS 6.2 1.8 - 8.0 K/UL    ABS. LYMPHOCYTES 1.3 0.8 - 3.5 K/UL    ABS. MONOCYTES 0.6 0.0 - 1.0 K/UL    ABS. EOSINOPHILS 0.0 0.0 - 0.4 K/UL    ABS. BASOPHILS 0.0 0.0 - 0.1 K/UL    DF MANUAL      RBC COMMENTS ANISOCYTOSIS  1+        NRBC 0.0 0  WBC    ABSOLUTE NRBC 0.00 0.00 - 0.01 K/uL    DIFFERENTIAL MANUAL DIFFERENTIAL ORDERED         Imaging:  Bone scan  IMPRESSION: Uptake in the right femur where there is a known fracture. Uptake  right lateral rib, likely fracture. No evidence of metastatic disease.     Path from femur fx   Right femur, bone biopsy:   Malignant epithelioid and spindle cell neoplasm, consistent with metastatic sarcomatoid carcinoma, see comment     Assessment and Plan:   Lung Ca  -has been getting adjuvant chemo after surgery  -on hold for now  -plan per Dr. Cecilia Willoughby    Femur fx  -bx positive for malignancy, path is going to try to compare it to path [facility]  -had surgery yesterday    PE  -was put back on Xarelto this morning

## 2017-04-07 NOTE — CONSULTS
Palliative Medicine Consult  Danis: [phone] ([phone])    Patient Name: Betty Del Rio  YOB: 1960    Date of Initial Consult: 4/5/2017  Reason for Consult: Pain management  Requesting Provider: Dr. Rita Whitten  Primary Care Physician: Kathleene Lanes, MD      SUMMARY:   Betty Del Rio is a 61 y.o. with a past history of HTN, anxiety, depression, PE, intention tremor, right lower lobe lung cancer s/p resection at [facility] 11/16, followed by Dr. Shankar Dickinson at [facility], received 3 cycles of Cisplat and Alimta was supposed to start 4th cycle this week, who was admitted on 4/4/2017 from home with a diagnosis of distal femur fracture with no associated reported trauma, appears to be pathological in nature. Had bone biopsy done today. Results pending. Bone scan negative for mets. For femur fixation tomorrow 4/6. Patient complained of right hip pain heard a \"pop\" noise then could not walk and had increased pain in right leg and hip. Current medical issues leading to Palliative Medicine involvement include: right hip and leg pain with spasms, right distal femur fracture, lung cancer, debility and weakness. Psychosocial: Lives by self. Not  only child daughterMary Grace Way- [phone]. She is patient's MPOA. Asked to bring in AMD paperwork. She works for [employer]. PALLIATIVE DIAGNOSES:   1. Right hip and Right femur pain and spasms  2. Lung cancer  3. Debility  4. Weakness       PLAN:   1. Pt states her pain has been well controlled with IV dilaudid. She reports severe itching with oxycodone. Discussed her chart reports itching with hydrocodone. She is willing to try Norco with benadryl q. 6 hours for itching. If this does not work, then I recommend oral dilaudid 2mg tabs. 2. Initial consult note routed to primary continuity provider  3.  Communicated plan of care with: Palliative IDT, RN       GOALS OF CARE / TREATMENT PREFERENCES:   [====Goals of Care====]  GOALS OF CARE:  Patient / health care proxy stated goals: \"to stop the pain get better and go home\"      TREATMENT PREFERENCES:   Code Status: Full Code    Advance Care Planning:  Advance Care Planning 4/5/2017   Patient's Healthcare Decision Maker is: Legal Next of Britt Wisdom   Primary Decision Maker Name Wilfredo Alex   Primary Decision Maker Phone Number [phone]   Primary Decision Maker Relationship to Patient Adult child   Confirm Advance Directive Yes, not on file       Other:    The palliative care team has discussed with patient / health care proxy about goals of care / treatment preferences for patient.  [====Goals of Care====]         HISTORY:     History obtained from: chart, patient and daughter    CHIEF COMPLAINT: right hip pain and right leg pain, having occasional spasms    HPI/SUBJECTIVE:    The patient is:   [x] Verbal and participatory  [] Non-participatory due to:   Ms. Tayla Acevedo is a 61year old female with lung cancer presents with pathological fracture to right femur.     Clinical Pain Assessment (nonverbal scale for severity on nonverbal patients):   [++++ Clinical Pain Assessment++++]  [++++Pain Severity++++]: Pain: 5  [++++Pain Character++++]: sharp  [++++Pain Duration++++]: since 4/3  [++++Pain Effect++++]: unable to ambulate, very painful to move  [++++Pain Factors++++]: pathological fracture  [++++Pain Frequency++++]: constant, worse with movement  [++++Pain Location++++]: right hip and right femur  [++++ Clinical Pain Assessment++++]     FUNCTIONAL ASSESSMENT:     Palliative Performance Scale (PPS):  PPS: 60       PSYCHOSOCIAL/SPIRITUAL SCREENING:     Advance Care Planning:  Advance Care Planning 4/5/2017   Patient's Healthcare Decision Maker is: Legal Next of Britt Wisdom   Primary Decision Maker Name Wilfredo Alex   Primary Decision Maker Phone Number [phone]   Primary Decision Maker Relationship to Patient Adult child   Confirm Advance Directive Yes, not on file        Any spiritual / Presybeterian concerns:  [] Yes /  [x] No    Caregiver Burnout:  [] Yes /  [x] No /  [] No Caregiver Present      Anticipatory grief assessment:   [x] Normal  / [] Maladaptive       ESAS Anxiety: Anxiety: 1    ESAS Depression: Depression: 0        REVIEW OF SYSTEMS:     Positive and pertinent negative findings in ROS are noted above in HPI. The following systems were [x] reviewed / [] unable to be reviewed as noted in HPI  Other findings are noted below. Systems: constitutional, ears/nose/mouth/throat, respiratory, gastrointestinal, genitourinary, musculoskeletal, integumentary, neurologic, psychiatric, endocrine. Positive findings noted below. Modified ESAS Completed by: provider   Fatigue: 1 Drowsiness: 0   Depression: 0 Pain: 5   Anxiety: 1 Nausea: 0   Anorexia: 0 Dyspnea: 0     Constipation: No              PHYSICAL EXAM:     From RN flowsheet:  Wt Readings from Last 3 Encounters:   04/05/17 158 lb 11.7 oz (72 kg)   03/21/17 171 lb 12.8 oz (77.9 kg)   12/29/16 180 lb (81.6 kg)     Blood pressure 110/63, pulse 85, temperature 98.1 °F (36.7 °C), resp. rate 18, height 5' 2\" (1.575 m), weight 158 lb 11.7 oz (72 kg), SpO2 98 %, not currently breastfeeding.     Pain Scale 1: Numeric (0 - 10)  Pain Intensity 1: 4     Pain Location 1: Leg  Pain Orientation 1: Right  Pain Description 1: Aching  Pain Intervention(s) 1: Medication (see MAR)  Last bowel movement, if known:     Constitutional: Middle aged female laying in bed with splint on right leg in NAD  Eyes: pupils equal, anicteric  ENMT: no nasal discharge, moist mucous membranes  Cardiovascular: regular rhythm, distal pulses intact  Respiratory: breathing not labored, symmetric  Gastrointestinal: soft non-tender, +bowel sounds  Musculoskeletal: no deformity, tenderness to palpation of right leg  Skin: warm, dry  Neurologic: following commands, moving all extremities except right leg  Psychiatric: full affect, no hallucinations  Other:       HISTORY:     Active Problems:    Femur fracture, right ([address] (4/4/2017)      Past Medical History:   Diagnosis Date    [facility] [address] [address]     BCC, lung cancer    Depression     History of screening mammography 3/27/2012    Ill-defined condition     fx femur    Intention tremor     Pap smear for cervical cancer screening 3/27/2012    Stress incontinence     Tobacco abuse       Past Surgical History:   Procedure Laterality Date    ENDOSCOPY, COLON, DIAGNOSTIC  2006    HX BREAST BIOPSY Left 09/23/2014    HX ORTHOPAEDIC Right 04/05/2017    biopsy of lesion    [place] COLSC FLX W/REMOVAL LESION BY HOT BX FORCEPS  1/4/2013           Family History   Problem Relation Age of Onset    Ovarian Cancer Mother     Elevated Lipids Mother     Clotting Disorder Mother     Cancer Mother      ovarian    Colon Cancer Father 48    Cancer Father 48     colon      History reviewed, no pertinent family history.   Social History   Substance Use Topics    Smoking status: Former Smoker     Packs/day: 1.00     Years: 31.00     Quit date: 12/1/2009    Smokeless tobacco: Never Used    Alcohol use No     Allergies   Allergen Reactions    Tetracycline Hives     Palms and soles    Lortab [Hydrocodone-Acetaminophen] Itching      Current Facility-Administered Medications   Medication Dose Route Frequency    rivaroxaban (XARELTO) tablet 20 mg  20 mg Oral DAILY WITH BREAKFAST    cyclobenzaprine (FLEXERIL) tablet 10 mg  10 mg Oral TID PRN    baclofen (LIORESAL) tablet 5 mg  5 mg Oral TID    oxyCODONE-acetaminophen (PERCOCET 10)  mg per tablet 1 Tab  1 Tab Oral Q4H PRN    senna-docusate (PERICOLACE) 8.6-50 mg per tablet 2 Tab  2 Tab Oral DAILY    polyethylene glycol (MIRALAX) packet 17 g  17 g Oral DAILY    famotidine (PEPCID) tablet 20 mg  20 mg Oral BID    sodium chloride (NS) flush 5-10 mL  5-10 mL IntraVENous Q8H    sodium chloride (NS) flush 5-10 mL  5-10 mL IntraVENous PRN    naloxone (NARCAN) injection 0.4 mg  0.4 mg IntraVENous PRN    ondansetron (ZOFRAN) injection 4 mg  4 mg IntraVENous Q4H PRN    bisacodyl (DULCOLAX) suppository 10 mg  10 mg Rectal DAILY PRN    HYDROmorphone (PF) 15 mg/30 ml (DILAUDID) PCA   IntraVENous CONTINUOUS    escitalopram oxalate (LEXAPRO) tablet 10 mg  10 mg Oral DAILY    buPROPion XL (WELLBUTRIN XL) tablet 300 mg  300 mg Oral 7am    oxybutynin chloride XL (DITROPAN XL) tablet 5 mg  5 mg Oral DAILY    pravastatin (PRAVACHOL) tablet 40 mg  40 mg Oral QHS    HYDROmorphone (PF) (DILAUDID) injection 0.5-1 mg  0.5-1 mg IntraVENous Q3H PRN    labetalol (NORMODYNE;TRANDATE) injection 20 mg  20 mg IntraVENous Q3H PRN          LAB AND IMAGING FINDINGS:     Lab Results   Component Value Date/Time    WBC 8.1 04/07/2017 04:22 AM    HGB 9.4 04/07/2017 04:22 AM    PLATELET 950 81/28/1726 04:22 AM     Lab Results   Component Value Date/Time    Sodium 139 04/07/2017 04:22 AM    Potassium 4.8 04/07/2017 04:22 AM    Chloride 105 04/07/2017 04:22 AM    CO2 25 04/07/2017 04:22 AM    BUN 18 04/07/2017 04:22 AM    Creatinine 0.91 04/07/2017 04:22 AM    Calcium 8.4 04/07/2017 04:22 AM      Lab Results   Component Value Date/Time    AST (SGOT) 10 04/05/2017 02:42 AM    Alk. phosphatase 56 04/05/2017 02:42 AM    Protein, total 6.3 04/05/2017 02:42 AM    Albumin 2.6 04/05/2017 02:42 AM    Globulin 3.7 04/05/2017 02:42 AM     Lab Results   Component Value Date/Time    INR 1.2 04/04/2017 01:24 AM    Prothrombin time 11.8 04/04/2017 01:24 AM    aPTT 27.3 04/04/2017 01:24 AM      No results found for: IRON, FE, TIBC, IBCT, PSAT, FERR   No results found for: PH, PCO2, PO2  No components found for: GLPOC   No results found for: CPK, CKMB             Total time: 30 minutes  Counseling / coordination time: 25 minutes  > 50% counseling / coordination?: yes    Prolonged service was provided for  []30 min   []75 min in face to face time in the presence of the patient. Time Start:   Time End:   Note: this can only be billed with 65907 (initial) or 04483 (follow up).   If multiple start / stop times, list each separately.

## 2017-04-07 NOTE — PROGRESS NOTES
Hospitalist Progress Note    NAME: Isaiah Lemons   :  1960   MRN:  [id number]       Interim Hospital Summary: 61 y.o. female whom presented on 2017 with      Assessment / Plan:  Femur fracture, right ([address] (2017), likely pathologic POA  - unable to ambulate with acute severe pain after a 'popping sound\". No known trauma  -CT showed distal R femur fracture s/p R femur biopsy on  positive for malignancy, unclear if this is the same pathology as her lung ca. She underwent femur intramedullary nailing on .    -PCA and IV dilaudid prn with bowel regimen. Plan to wean off pca pump to an oral agent  -PT/OT/CM/rehab on discharge     Recent pulmonary embolism 1 month ago POA diagnosed at [facility]  -resuming xarelto    Lung cancer RLL POA s/p resection at [facility] doctors 2016  -currently receiving adjuvant chemo after surgery at [address] with [address] Angelica Reynolds  -no prior mets, body PET scan (no LE extremities) in 2016 was negative. Bone scan neg for mets  -Oncology following, appreciate recs     Elevated BP with diagnosis of HTN POA  -stable  -PRN labetalol     Anxiety/depression POA  -continue lexapro and wellbutrin      Hyperlipidemia POA  -continue statin     DVT prophylaxis: lovenox     Stress ulcer prophylaxis: Not indicated     Code status: Full code     Subjective:     Chief Complaint / Reason for Physician Visit  No acute complaints. Pain is well control on PCA. Appetite normal.    Discussed with RN events overnight. Review of Systems:  Symptom Y/N Comments  Symptom Y/N Comments   Fever/Chills n   Chest Pain n    Poor Appetite n   Edema n    Cough n   Abdominal Pain n    Sputum n   Joint Pain y    SOB/LEE n   Pruritis/Rash     Nausea/vomit    Tolerating PT/OT     Diarrhea    Tolerating Diet     Constipation    Other       Could NOT obtain due to:      Objective:     VITALS:   Last 24hrs VS reviewed since prior progress note.  Most recent are:  Patient Vitals for the past 24 hrs:   Temp Pulse Resp BP SpO2   04/07/17 1130 98.1 °F (36.7 °C) 85 18 110/63 98 %   04/07/17 0830 98.7 °F (37.1 °C) 83 18 120/77 98 %   04/07/17 0340 98.5 °F (36.9 °C) 76 - 119/70 97 %   04/07/17 0238 - 73 - 108/75 97 %   04/07/17 0138 - 69 - 110/68 97 %   04/07/17 0013 - - - 99/62 -   04/06/17 2338 98.5 °F (36.9 °C) 69 18 92/86 97 %   04/06/17 2238 - 78 18 95/58 95 %   04/06/17 2208 98.4 °F (36.9 °C) 80 18 97/88 96 %   04/06/17 2138 98.5 °F (36.9 °C) 88 18 96/59 93 %   04/06/17 2108 - 77 18 108/75 96 %   04/06/17 2038 98.2 °F (36.8 °C) 88 18 98/75 95 %   04/06/17 2015 - 79 13 119/66 96 %   04/06/17 2000 - 75 10 121/69 96 %   04/06/17 1945 - 82 9 120/63 97 %   04/06/17 1930 - 84 13 127/74 97 %   04/06/17 1915 - 80 14 153/84 98 %   04/06/17 1900 - 84 18 157/88 100 %   04/06/17 1845 - 84 20 (!) 142/92 100 %   04/06/17 1830 - 86 19 152/74 100 %   04/06/17 1825 - 87 17 154/77 100 %   04/06/17 1820 - 87 18 154/74 100 %   04/06/17 1815 - 88 19 144/69 100 %   04/06/17 1810 - 91 18 143/73 99 %   04/06/17 1806 - - - 130/81 -   04/06/17 1805 97.4 °F (36.3 °C) (!) 106 10 130/81 97 %   04/06/17 1515 - 76 16 - 98 %       Intake/Output Summary (Last 24 hours) at 04/07/17 1507  Last data filed at 04/07/17 1215   Gross per 24 hour   Intake             1900 ml   Output             2450 ml   Net             -550 ml        PHYSICAL EXAM:  General: WD, WN. Alert, cooperative, no acute distress    EENT:  EOMI. Anicteric sclerae. MMM  Resp:  CTA bilaterally, no wheezing or rales. No accessory muscle use  CV:  Regular  rhythm,  No edema  GI:  Soft, Non distended, Non tender.  +Bowel sounds  Neurologic:  Alert and oriented X 3, normal speech,   Psych:   Good insight. Not anxious nor agitated  Skin:  No rashes.   No jaundice    Reviewed most current lab test results and cultures  YES  Reviewed most current radiology test results   YES  Review and summation of old records today    NO  Reviewed patient's current orders and [facility] YES  PMH/SH reviewed - no change compared to H&P  ________________________________________________________________________  Care Plan discussed with:    Comments   Patient x    Family      RN x    Care Manager     Consultant                        Multidiciplinary team rounds were held today with , nursing, pharmacist and clinical coordinator. Patient's plan of care was discussed; medications were reviewed and discharge planning was addressed. ________________________________________________________________________  Total NON critical care TIME:  35   Minutes    Total CRITICAL CARE TIME Spent:   Minutes non procedure based      Comments   >50% of visit spent in counseling and coordination of care     ________________________________________________________________________  Larena Pallas, MD     Procedures: see electronic medical records for all procedures/Xrays and details which were not copied into this note but were reviewed prior to creation of Plan. LABS:  I reviewed today's most current labs and imaging studies.   Pertinent labs include:  Recent Labs      04/07/17 0422 04/06/17 0316 04/05/17 0242   WBC  8.1  7.6  8.2   HGB  9.4*  8.5*  9.5*   HCT  29.6*  27.1*  29.4*   PLT  204  234  283     Recent Labs      04/07/17 0422 04/06/17 0316 04/05/17   0242   NA  139  141  143   K  4.8  4.4  4.5   CL  105  107  108   CO2  25  28  27   GLU  129*  90  136*   BUN  18  21*  21*   CREA  0.91  0.93  0.94   CA  8.4*  8.4*  8.2*   ALB   --    --   2.6*   TBILI   --    --   0.3   SGOT   --    --   10*   ALT   --    --   15       Signed: Larena Pallas, MD

## 2017-04-07 NOTE — PROGRESS NOTES
Pharmacy MUE for Rivaroxaban    Pharmacy review for this 61 y.o. female ordered rivaroxaban for PE diagnosed 1 month ago, S/p post hip fx repair . PMH:  Patient has transitioned to Xarelto 20mg PO Daily PTA and per H&P plan was to initiate Lovenox qSQ q12h pre op pending surgery plan. Last dose of therapeutic Lovenox was  15:00. Major Interacting Medications: none  Platelet Inhibitors: none    Estimated Creatinine Clearance: 63.4 mL/min (based on Cr of 0.91). Estimated Creatinine Clearance (using IBW): 53.9 mL/min  Recent Labs      17   0422  17   0316  17   0242   CREA  0.91  0.93  0.94   BUN  18  21*  21*   WBC  8.1  7.6  8.2   BANDS  0   --    --    NA  139  141  143   K  4.8  4.4  4.5   CA  8.4*  8.4*  8.2*   ALB   --    --   2.6*   HGB  9.4*  8.5*  9.5*   HCT  29.6*  27.1*  29.4*   PLT  204  234  283   ALT   --    --   15     Temp (24hrs), Av.3 °F (36.8 °C), Min:97.4 °F (36.3 °C), Max:98.7 °F (37.1 °C)    Child Almeida Score, if applicable: na    Assessment/Plan:  Confirmed Xarelto PTA w/ Dr. Cano Conception is to resume this am 4/7. Confirmed daily PTA dose time as \"morning\" w/ pt. Continue Xarelto 20mg PO Daily for continued PE treatment,  post hip fx repair. Ricardo Otoole [facility]     Dosage Guidelines  o Non valvular atrial fibrillation   - Clcr > 50 ml/min 20 mg daily with evening meal  - Clcr 15-50 ml/min 15 mg daily with evening meal  - Clcr < 15 ml/min: avoid  o DVT/PE Treatment or Long-Term Prevention of Recurrent DVT/PE  - Acute treatment: 1st 21 days after event 15 mg BID with meals  - Treatment after 1st 21 days and/or long-term prophylaxis for recurrent DVT/PE 20 mg daily with evening meal  - Clcr < 30 ml/min: Avoid  o DVT prophylaxis following knee or hip surgery:   - Clcr > 50 ml/min: 10 mg orally once daily (with or without food)  - Clcr 30-50 ml/min: use with caution  - Clcr < 30 ml/min: avoid   Contraindications:  Active pathological bleeding   Rivaroxaban is recommended to be avoided (pharmacist must call the physician and recommend an alternative agent) in the following situations:   o Patients receiving drugs that are potent inhibitors of MAK513 34A enzymes and P-gp drug transport system as they significantly increase serum concentrations of rivaroxaban (AUC increased by > 150 %)  - Ketoconazole,  Itraconazole  - Ritonovir, Lopinavir/ritonavir, Indinavir/ritonavir  - Conivaptan  o Patients receiving drugs that are combined P-gp and potent inducers of CYP 3A4 enzymes as they significantly decrease serum concentrations of rivaroxaban (AUC decreased by 50%)  - Rifampin, Phenytoin, Carbamazepine, Odilias Wort  o Patients with CrCl 15 to 80 ml/min and concomitant combined P-gp and weak or moderate CY inhibitors should only receive rivaroxaban if the potential benefits outweighs the potential risk:  - Azithromycin, Erythromycin  - Cimetadine  - Amiodarone, Diltiazem, Dronedarone, Quinidine, verapamil  - Felodipine  - Ranolazine  o Hepatic impairment: Avoid use in patients with moderate (Child-Almeida B) or severe (Child-Almeida C) hepatic impairment due to an estimated increase in rivaroxaban exposure.   (AUC increased by  > 127 %)   - Child Almeida Score Calculator = __________  Class A: 5-6, Class B: 7-9, Class C: 10-15  Only score if patient has liver dysfunction  - Bilirubin: <2, 2-3, >3       Albumin: >3.5, 3.5-2.8, < 2.8       Prolongation of PT(INR): < 4 (<1.7), 4-6 (1.7-2.3), > 6 (>2.3)                                Ascities: Absent, Mild-Moderate, Severe       Encephalopathy: Absent, Mild (I-II) Severe (III-IV)  o Patients with severe renal impairment: Clcr < 30 mL/min for DVT prophylaxis or DVT/PE treatment, Clcr < 15 ml/min for non valvular atrial fibrillation  o Concurrent use   - THR/TKR: clopidogrel (bleeding time is doubled) or other anticoagulants  - Nonvalvular atrial fibriallation: other anticoagulants  o Nursing mothers should stop drug or discontinue nursing

## 2017-04-07 NOTE — PROGRESS NOTES
Problem: Mobility Impaired (Adult and Pediatric)  Goal: *Acute Goals and Plan of Care (Insert Text)  Physical Therapy Goals  Initiated 4/7/2017  1. Patient will move from supine to sit and sit to supine in bed with supervision/set-up within 7 day(s). 2. Patient will transfer from bed to chair and chair to bed with supervision/set-up using the least restrictive device within 7 day(s). 3. Patient will perform sit to stand with supervision/set-up within 7 day(s). 4. Patient will ambulate with minimal assistance/contact guard assist for 40 feet with the least restrictive device within 7 day(s). PHYSICAL THERAPY EVALUATION  Patient: Aniceto Lennox (68 y.o. female)  Date: 4/7/2017  Primary Diagnosis: Femur fracture, right (HCC)  Right Femur Fracture  PATHOLOGIC FRACTURE RIGHT FEMUR  Procedure(s) (LRB):  FEMUR INTRA MEDULLARY NAILING RETROGRADE SUPRACHONDYLAR- RIGHT FEMUR (Right) 1 Day Post-Op   Precautions:   TTWB, Fall      ASSESSMENT :  Based on the objective data described below, the patient presents with generalized weakness, decreased activity tolerance, impaired balance and altered gait. PTA pt was very weak from multiple surgery at [facility], she was not using any AD PTA. She was received in supine and cleared by nursing to mobilize. Bed mobility was performed with min A to help with RLE. Sit<>stand was performed with CGA x 2 to come to full stand with RW and maintain TTWB. She was able to hop to the chair while maintaining TTWB on the R. Changed chair to recliner so she could prop her foot up. She was left sitting up in the chair at the end of the session. Recommending IP rehab at discharge to build her strength and endurance. Ice applied at the end of the session. Patient will benefit from skilled intervention to address the above impairments.   Patients rehabilitation potential is considered to be Good  Factors which may influence rehabilitation potential include:   [ ]         None noted  [ ] Mental ability/status  [X]         Medical condition  [ ]         Home/family situation and support systems  [ ]         Safety awareness  [ ]         Pain tolerance/management  [ ]         Other:        PLAN :  Recommendations and Planned Interventions:  [X]           Bed Mobility Training             [ ]    Neuromuscular Re-Education  [X]           Transfer Training                   [ ]    Orthotic/Prosthetic Training  [X]           Gait Training                         [ ]    Modalities  [X]           Therapeutic Exercises           [ ]    Edema Management/Control  [X]           Therapeutic Activities            [X]    Patient and Family Training/Education  [ ]           Other (comment):     Frequency/Duration: Patient will be followed by physical therapy  twice daily to address goals. Discharge Recommendations: Inpatient Rehab  Further Equipment Recommendations for Discharge: TBD       SUBJECTIVE:   Patient stated I am just so weak from all theses surgeries.       OBJECTIVE DATA SUMMARY:   HISTORY:    Past Medical History:   Diagnosis Date    Cancer ([facility] [address]       BCC, lung cancer    Depression      History of screening mammography 3/27/2012    Ill-defined condition       fx femur    Intention tremor      Pap smear for cervical cancer screening 3/27/2012    Stress incontinence      Tobacco abuse         Past Surgical History:   Procedure Laterality Date    ENDOSCOPY, COLON, DIAGNOSTIC   2006    HX BREAST BIOPSY Left 09/23/2014    HX ORTHOPAEDIC Right 04/05/2017     biopsy of lesion    [place] COLSC FLX W/REMOVAL LESION BY HOT BX FORCEPS   1/4/2013            Prior Level of Function/Home Situation: lives at home with family, was not using any AD  Personal factors and/or comorbidities impacting plan of care:      Home Situation  Home Environment: Private residence  One/Two Story Residence: Two story  Living Alone: No  Support Systems: Family member(s), Friends \ neighbors  Patient Expects to be Discharged to[de-identified] Private residence  Current DME Used/Available at Home: None     EXAMINATION/PRESENTATION/DECISION MAKING:   Critical Behavior:  Neurologic State: Alert  Orientation Level: Appropriate for age, Oriented X4  Cognition: Appropriate decision making, Follows commands  Safety/Judgement: Awareness of environment, Fall prevention  Hearing: Auditory  Auditory Impairment: None  Hearing Aids/Status: Does not own  Skin:  Dressing intact  Edema: WDL  Range Of Motion:  AROM: Generally decreased, functional           PROM: Generally decreased, functional           Strength:    Strength: Generally decreased, functional                    Tone & Sensation:   Tone: Normal              Sensation: Intact               Coordination:  Coordination: Within functional limits  Vision:      Functional Mobility:  Bed Mobility:  Rolling: Minimum assistance  Supine to Sit: Minimum assistance (helping with leg`)     Scooting: Supervision  Transfers:  Sit to Stand: Contact guard assistance;Assist x2  Stand to Sit: Contact guard assistance;Assist x2        Bed to Chair: Contact guard assistance;Assist x2              Balance:   Sitting: Intact  Standing: Impaired; Without support  Standing - Static: Good;Constant support  Standing - Dynamic : Fair  Ambulation/Gait Training:  Distance (ft): 3 Feet (ft)  Assistive Device: Gait belt;Walker, rolling  Ambulation - Level of Assistance: Contact guard assistance        Gait Abnormalities: Antalgic;Decreased step clearance (hop)        Base of Support: Shift to left                 Functional Measure:  Barthel Index:      Bathin  Bladder: 10  Bowels: 10  Groomin  Dressin  Feeding: 10  Mobility: 0  Stairs: 0  Toilet Use: 0  Transfer (Bed to Chair and Back): 10  Total: 50         Barthel and G-code impairment scale:  Percentage of impairment CH  0% CI  1-19% CJ  20-39% CK  40-59% CL  60-79% CM  80-99% CN  100%   Barthel Score 0-100 100 99-80 79-60 59-40 20-39 1-19    0   Barthel Score 0-20 20 17-19 13-16 9-12 5-8 1-4 0      The Barthel ADL Index: Guidelines  1. The index should be used as a record of what a patient does, not as a record of what a patient could do. 2. The main aim is to establish degree of independence from any help, physical or verbal, however minor and for whatever reason. 3. The need for supervision renders the patient not independent. 4. A patient's performance should be established using the best available evidence. Asking the patient, friends/relatives and nurses are the usual sources, but direct observation and common sense are also important. However direct testing is not needed. 5. Usually the patient's performance over the preceding 24-48 hours is important, but occasionally longer periods will be relevant. 6. Middle categories imply that the patient supplies over 50 per cent of the effort. 7. Use of aids to be independent is allowed. Cem Salinas., Barthel, D.W. (4328). Functional evaluation: the Barthel Index. [address] [facility] (14)2. BLANCA Will, Niraj Thorne., Beny Dhaliwal., [place], [address] [place] [address] Ave (1999). Measuring the change indisability after inpatient rehabilitation; comparison of the responsiveness of the Barthel Index and Functional [place] Measure. Journal of Neurology, Neurosurgery, and Psychiatry, 66(4), 586-879. DERREK Casper, NATHAN Johnson, & Rowan Mckeon, MMaritaA. (2004.) Assessment of post-stroke quality of life in cost-effectiveness studies: The usefulness of the Barthel Index and the EuroQoL-5D. Quality of Life Research, 13, 604-14            G codes: In compliance with CMSs Claims Based Outcome Reporting, the following G-code set was chosen for this patient based on their primary functional limitation being treated: The outcome measure chosen to determine the severity of the functional limitation was the barthel with a score of 50/100 which was correlated with the impairment scale.       · Mobility - Walking and Moving Around:               - CURRENT STATUS:           CK - 40%-59% impaired, limited or restricted                - GOAL STATUS:       CJ - 20%-39% impaired, limited or restricted               - D/C STATUS:                       ---------------To be determined---------------      Physical Therapy Evaluation Charge Determination   History Examination Presentation Decision-Making   MEDIUM  Complexity : 1-2 comorbidities / personal factors will impact the outcome/ POC  HIGH Complexity : 4+ Standardized tests and measures addressing body structure, function, activity limitation and / or participation in recreation  MEDIUM Complexity : Evolving with changing characteristics  MEDIUM Complexity : FOTO score of 26-74      Based on the above components, the patient evaluation is determined to be of the following complexity level: MEDIUM     Pain:  Pain Scale 1: Numeric (0 - 10)  Pain Intensity 1: 4  Pain Location 1: Leg  Pain Orientation 1: Right  Pain Description 1: Aching  Pain Intervention(s) 1: Medication (see MAR)  Activity Tolerance:   WFL, VSS  Please refer to the flowsheet for vital signs taken during this treatment. After treatment:   [X]         Patient left in no apparent distress sitting up in chair  [ ]         Patient left in no apparent distress in bed  [X]         Call bell left within reach  [X]         Nursing notified  [ ]         Caregiver present  [ ]         Bed alarm activated      COMMUNICATION/EDUCATION:   The patients plan of care was discussed with: Occupational Therapist and Registered Nurse.  [X]         Fall prevention education was provided and the patient/caregiver indicated understanding. [ ]         Patient/family have participated as able in goal setting and plan of care. [X]         Patient/family agree to work toward stated goals and plan of care. [ ]         Patient understands intent and goals of therapy, but is neutral about his/her participation.   [ ] Patient is unable to participate in goal setting and plan of care.      Thank you for this referral.  Olive Murillo, PT, DPT   Time Calculation: 47 mins

## 2017-04-07 NOTE — PROGRESS NOTES
Problem: Self Care Deficits Care Plan (Adult)  Goal: *Acute Goals and Plan of Care (Insert Text)  Occupational Therapy Goals  Initiated 4/7/2017  1. Patient will perform bathing seated or standing with minimal assistance/contact guard assist within 7 day(s). 2. Patient will perform lower body dressing with moderate assistance and AE within 7 day(s). 3. Patient will perform grooming standing at the sink with supervision/set-up within 7 day(s). 4. Patient will perform toilet transfers with minimal assistance/contact guard assist within 7 day(s). 5. Patient will perform all aspects of toileting with independence within 7 day(s). 6. Patient will participate in upper extremity therapeutic exercise/activities with independence for 10 minutes within 7 day(s). 7. Patient will utilize energy conservation techniques during functional activities with verbal cues within 7 day(s). OCCUPATIONAL THERAPY EVALUATION  Patient: Suzi Mijares (50 y.o. female)  Date: 4/7/2017  Primary Diagnosis: Femur fracture, right (HCC)  Right Femur Fracture  PATHOLOGIC FRACTURE RIGHT FEMUR  Procedure(s) (LRB):  FEMUR INTRA MEDULLARY NAILING RETROGRADE SUPRACHONDYLAR- RIGHT FEMUR (Right) 1 Day Post-Op   Precautions:          ASSESSMENT :  Based on the objective data described below, the patient presents with generalized weakness, decreased endurance, strength, mobility, functional transfers, and ADLs. Pt was living at home alone and was able to bathe and dress self prior with no assist.  She was supine in bed and anxious about getting up to EOB today. She was min assist only for bed mobility, mainly to assist with moving her right leg. Pt was CGA of 2 to stand and CGA for transfer to the chair. Pt was sitting in regular chair and could not get her right leg comfortable and transferred her to a recliner, in order for pt to be able to elevate her feet.   Pt was left in chair and stated that she did not have any increased pain at this time and washed her face but did not care to wash up for the day. Recommend that pt have further therapy at [facility] at in pt rehab, and pt is in agreement. .     Patient will benefit from skilled intervention to address the above impairments. Patients rehabilitation potential is considered to be Good  Factors which may influence rehabilitation potential include:   [X]             None noted  [ ]             Mental ability/status  [ ]             Medical condition  [ ]             Home/family situation and support systems  [ ]             Safety awareness  [ ]             Pain tolerance/management  [ ]             Other:        PLAN :  Recommendations and Planned Interventions:  [X]               Self Care Training                  [ ]        Therapeutic Activities  [X]               Functional Mobility Training    [ ]        Cognitive Retraining  [X]               Therapeutic Exercises           [X]        Endurance Activities  [ ]               Balance Training                   [ ]        Neuromuscular Re-Education  [ ]               Visual/Perceptual Training     [X]   Home Safety Training  [X]               Patient Education                 [X]        Family Training/Education  [ ]               Other (comment):     Frequency/Duration: Patient will be followed by occupational therapy 5 times a week to address goals. Discharge Recommendations: Inpatient Rehab  Further Equipment Recommendations for Discharge: tbd       SUBJECTIVE:   Patient stated I was so afraid of moving because of pain. Adolfo Marlow      OBJECTIVE DATA SUMMARY:   HISTORY:   Past Medical History:   Diagnosis Date    Cancer ([facility] [address]       [facility], lung cancer    Depression      History of screening mammography 3/27/2012    Ill-defined condition       fx femur    Intention tremor      Pap smear for cervical cancer screening 3/27/2012    Stress incontinence      Tobacco abuse       Past Surgical History:   Procedure Laterality Date    ENDOSCOPY, COLON, DIAGNOSTIC   2006    HX BREAST BIOPSY Left 09/23/2014    HX ORTHOPAEDIC Right 04/05/2017     biopsy of lesion    NV COLSC FLX W/REMOVAL LESION BY HOT BX FORCEPS   1/4/2013              Prior Level of Function/Home Situation: pt lives with family and was independent in all areas prior. Expanded or extensive additional review of patient history:      Home Situation  Home Environment: Private residence  One/Two Story Residence: Two story  Living Alone: No  Support Systems: Family member(s), Friends \ neighbors  Patient Expects to be Discharged to[de-identified] Private residence  Current DME Used/Available at Home: None  [X]  Right hand dominant             [ ]  Left hand dominant     EXAMINATION OF PERFORMANCE DEFICITS:  Cognitive/Behavioral Status:  Neurologic State: Alert  Orientation Level: Appropriate for age;Oriented X4  Cognition: Appropriate decision making; Follows commands  Perception: Appears intact  Perseveration: No perseveration noted  Safety/Judgement: Awareness of environment; Fall prevention     Skin: in good health     Edema: none     Hearing: Auditory  Auditory Impairment: None  Hearing Aids/Status: Does not own     Vision/Perceptual:                 intact                     Range of Motion:     AROM: Generally decreased, functional  PROM: Generally decreased, functional                       Strength:     Strength: Generally decreased, functional                 Coordination:  Coordination: Within functional limits  Fine Motor Skills-Upper: Left Intact; Right Intact    Gross Motor Skills-Upper: Left Intact; Right Intact     Tone & Sensation:     Tone: Normal  Sensation: Intact                       Balance:  Sitting: Intact  Standing: Impaired; Without support  Standing - Static: Good;Constant support  Standing - Dynamic : Fair     Functional Mobility and Transfers for ADLs:  Bed Mobility:  Rolling: Minimum assistance  Supine to Sit: Minimum assistance (helping with leg`)  Scooting: Supervision Transfers:  Sit to Stand: Contact guard assistance;Assist x2  Stand to Sit: Contact guard assistance;Assist x2  Bed to Chair: Contact guard assistance;Assist x2     ADL Assessment:  Feeding: Independent     Oral Facial Hygiene/Grooming: Setup     Bathing: Maximum assistance;Minimum assistance     Upper Body Dressing: Setup     Lower Body Dressing: Maximum assistance;Minimum assistance                 Barthel Index:      Bathin  Bladder: 10  Bowels: 10  Groomin  Dressin  Feeding: 10  Mobility: 0  Stairs: 0  Toilet Use: 0  Transfer (Bed to Chair and Back): 10  Total: 50         Barthel and G-code impairment scale:  Percentage of impairment CH  0% CI  1-19% CJ  20-39% CK  40-59% CL  60-79% CM  80-99% CN  100%   Barthel Score 0-100 100 99-80 79-60 59-40 20-39 1-19    0   Barthel Score 0-20 20 17-19 13-16 9-12 5-8 1-4 0      The Barthel ADL Index: Guidelines  1. The index should be used as a record of what a patient does, not as a record of what a patient could do. 2. The main aim is to establish degree of independence from any help, physical or verbal, however minor and for whatever reason. 3. The need for supervision renders the patient not independent. 4. A patient's performance should be established using the best available evidence. Asking the patient, friends/relatives and nurses are the usual sources, but direct observation and common sense are also important. However direct testing is not needed. 5. Usually the patient's performance over the preceding 24-48 hours is important, but occasionally longer periods will be relevant. 6. Middle categories imply that the patient supplies over 50 per cent of the effort. 7. Use of aids to be independent is allowed. Zac Franklin., Barthel, D.W. (3471). Functional evaluation: the Barthel Index. [facility] (14)2. BLANCA Sinclair, Joetta Kayser., Alfonzo Hall, Bee, [address] [place] [address] ().  Measuring the change indisability after inpatient rehabilitation; comparison of the responsiveness of the Barthel Index and Functional [place] Measure. Journal of Neurology, Neurosurgery, and Psychiatry, 66(4), 247-375. DERREK West, NATHAN Johnson, & Paulette Song M.A. (2004.) Assessment of post-stroke quality of life in cost-effectiveness studies: The usefulness of the Barthel Index and the EuroQoL-5D. Quality of Life Research, 13, 221-94         Cognitive Retraining  Safety/Judgement: Awareness of environment; Fall prevention           Functional Measure:        G codes: In compliance with CMSs Claims Based Outcome Reporting, the following G-code set was chosen for this patient based on their primary functional limitation being treated: The outcome measure chosen to determine the severity of the functional limitation was the Barthel with a score of 50/100 which was correlated with the impairment scale. · Self Care:               - CURRENT STATUS:    CK - 40%-59% impaired, limited or restricted               - GOAL STATUS:           CJ - 20%-39% impaired, limited or restricted               - D/C STATUS:                       ---------------To be determined---------------      Occupational Therapy Evaluation Charge Determination   History Examination Decision-Making   MEDIUM Complexity : Expanded review of history including physical, cognitive and psychosocial  history  MEDIUM Complexity : 3-5 performance deficits relating to physical, cognitive , or psychosocial skils that result in activity limitations and / or participation restrictions MEDIUM Complexity : Patient may present with comorbidities that affect occupational performnce.  Miniml to moderate modification of tasks or assistance (eg, physical or verbal ) with assesment(s) is necessary to enable patient to complete evaluation       Based on the above components, the patient evaluation is determined to be of the following complexity level: MEDIUM  Pain:  Pain Scale 1: Numeric (0 - 10)  Pain Intensity 1: 4  Pain Location 1: Leg  Pain Orientation 1: Right  Pain Description 1: Aching  Pain Intervention(s) 1: Medication (see MAR)  Activity Tolerance:   vss  Please refer to the flowsheet for vital signs taken during this treatment. After treatment:   [X] Patient left in no apparent distress sitting up in chair  [ ] Patient left in no apparent distress in bed  [X] Call bell left within reach  [X] Nursing notified  [ ] Caregiver present  [ ] Bed alarm activated      COMMUNICATION/EDUCATION:   The patients plan of care was discussed with: Physical Therapist and nursing  [X] Home safety education was provided and the patient/caregiver indicated understanding. [X] Patient/family have participated as able in goal setting and plan of care. [X] Patient/family agree to work toward stated goals and plan of care. [ ] Patient understands intent and goals of therapy, but is neutral about his/her participation. [ ] Patient is unable to participate in goal setting and plan of care. This patients plan of care is appropriate for delegation to [facility].      Thank you for this referral.  Yinka Baird, OT  Time Calculation: 60 mins

## 2017-04-07 NOTE — PROGRESS NOTES
Problem: Mobility Impaired (Adult and Pediatric)  Goal: *Acute Goals and Plan of Care (Insert Text)  Physical Therapy Goals  Initiated 4/7/2017  1. Patient will move from supine to sit and sit to supine in bed with supervision/set-up within 7 day(s). 2. Patient will transfer from bed to chair and chair to bed with supervision/set-up using the least restrictive device within 7 day(s). 3. Patient will perform sit to stand with supervision/set-up within 7 day(s). 4. Patient will ambulate with minimal assistance/contact guard assist for 40 feet with the least restrictive device within 7 day(s). PHYSICAL THERAPY TREATMENT  Patient: iNls Fairchild (44 y.o. female)  Date: 4/7/2017  Diagnosis: Femur fracture, right (HCC)  Right Femur Fracture  PATHOLOGIC FRACTURE RIGHT FEMUR <principal problem not specified>  Procedure(s) (LRB):  FEMUR INTRA MEDULLARY NAILING RETROGRADE SUPRACHONDYLAR- RIGHT FEMUR (Right) 1 Day Post-Op  Precautions: TTWB, Fall      ASSESSMENT:  Pt was received sitting sitting up in the recliner from morning session. Cleared by nursing to mobilize. She needed min A to help lower her LE when bringing the recliner into an upright position. Sit<>stand performed with Jocelynn/CGA to come to full stand with RW while maintaining TTWB. She feels more comfortable when author has foot under her surgical leg to make sure she is not putting too much weight through. She was able to hop 6ft with RW and CGA to the bed. Needed min A to help with LEs into the bed at a very slow pace. Positioned for comfort and then nursing entered the room to pull matta. Continue to strongly recommend IP at discharge. Progression toward goals:  [X]    Improving appropriately and progressing toward goals  [ ]    Improving slowly and progressing toward goals  [ ]    Not making progress toward goals and plan of care will be adjusted       PLAN:  Patient continues to benefit from skilled intervention to address the above impairments. Continue treatment per established plan of care. Discharge Recommendations:  Inpatient Rehab  Further Equipment Recommendations for Discharge:  TBD       SUBJECTIVE:   Patient stated Hollace Nyhan just like to know what to except .       OBJECTIVE DATA SUMMARY:   Critical Behavior:  Neurologic State: Alert  Orientation Level: Appropriate for age, Oriented X4  Cognition: Appropriate decision making, Follows commands  Safety/Judgement: Awareness of environment, Fall prevention  Functional Mobility Training:  Bed Mobility:  Rolling: Minimum assistance  Supine to Sit: Minimum assistance (helping with leg`)  Sit to Supine: Minimum assistance (LEs)  Scooting: Supervision        Transfers:  Sit to Stand: Contact guard assistance;Assist x2  Stand to Sit: Contact guard assistance;Assist x2        Bed to Chair: Contact guard assistance;Assist x2                    Balance:  Sitting: Intact  Standing: Impaired; Without support  Standing - Static: Good;Constant support  Standing - Dynamic : Fair  Ambulation/Gait Training:  Distance (ft): 6 Feet (ft)  Assistive Device: Gait belt;Walker, rolling  Ambulation - Level of Assistance: Contact guard assistance        Gait Abnormalities: Antalgic;Decreased step clearance (hop)        Base of Support: Shift to left                    Pain:  Pain Scale 1: Numeric (0 - 10)  Pain Intensity 1: 4  Pain Location 1: Leg  Pain Orientation 1: Right  Pain Description 1: Aching  Pain Intervention(s) 1: Medication (see MAR)  Activity Tolerance:   WFL, faituged  Please refer to the flowsheet for vital signs taken during this treatment.   After treatment:   [ ]    Patient left in no apparent distress sitting up in chair  [X]    Patient left in no apparent distress in bed  [X]    Call bell left within reach  [X]    Nursing notified  [ ]    Caregiver present  [ ]    Bed alarm activated      COMMUNICATION/COLLABORATION:   The patients plan of care was discussed with: Registered Nurse     Tao Donovan PT, DPT   Time Calculation: 20 mins

## 2017-04-07 NOTE — PERIOP NOTES
TRANSFER - OUT REPORT:    Verbal report given to FRENCH Arellano(name) on Jacek Davis  being transferred to [place] [id number](unit) for routine post - op       Report consisted of patients Situation, Background, Assessment and   Recommendations(SBAR). Information from the following report(s) SBAR, Kardex, OR Summary, Intake/Output, MAR and Recent Results was reviewed with the receiving nurse. Opportunity for questions and clarification was provided.       Patient transported with:   O2 @ 4 liters  Registered Nurse

## 2017-04-07 NOTE — PROGRESS NOTES
As per PT/OT reccomendation CM send  referral to [facility]. However pt said she changed her mind even though she live in [place]  she would like to go to an other rehab close to  [facility] where her daughter and family  lives. CM provided Acute rehab list to pt for review. pt said she will talk to her daughter and friends and make a decision. pt has [employer] and need pre auth.     Andreas Li  Ext [phone]

## 2017-04-07 NOTE — PROGRESS NOTES
Orthopedic NP Progress Note  Post Op day: 1 Day Post-Op    April 7, 2017 12:15 PM     [address] [place] [address]    Attending Physician: Treatment Team: Attending Provider: Cherry Melton MD; Consulting Provider: Nesha Gentile NP; Consulting Provider: Olga Sears MD; Consulting Provider: Shelley Alexis MD; Consulting Provider: Aj Laguerre MD; Care Manager: Raghavendra Caballero; Utilization Review: [facility]     Vital Signs:    Patient Vitals for the past 8 hrs:   BP Temp Pulse Resp SpO2   04/07/17 0830 120/77 98.7 °F (37.1 °C) 83 18 98 %     BMI (calculated): 29 (04/05/17 0620)    Intake/Output:     04/05 1901 - 04/07 0700  In: 1900 [I.V.:1900]  Out: 2650 [Urine:2550]    Pain Control:   Pain Assessment  Pain Scale 1: Numeric (0 - 10)  Pain Intensity 1: 4  Pain Location 1: Leg  Pain Orientation 1: Right  Pain Description 1: Aching  Pain Intervention(s) 1: Medication (see MAR)    LAB:    Recent Labs      04/07/17   0422   HCT  29.6*   HGB  9.4*     Lab Results   Component Value Date/Time    Sodium 139 04/07/2017 04:22 AM    Potassium 4.8 04/07/2017 04:22 AM    Chloride 105 04/07/2017 04:22 AM    CO2 25 04/07/2017 04:22 AM    Glucose 129 04/07/2017 04:22 AM    BUN 18 04/07/2017 04:22 AM    Creatinine 0.91 04/07/2017 04:22 AM    Calcium 8.4 04/07/2017 04:22 AM       Subjective:  [address] [place] [address] is a 61 y.o. female s/p a  Procedure(s): FEMUR INTRA MEDULLARY NAILING RETROGRADE SUPRACHONDYLAR- RIGHT FEMUR   Procedure(s): FEMUR INTRA MEDULLARY NAILING RETROGRADE SUPRACHONDYLAR- RIGHT FEMUR. Tolerating diet. Pain well managed on PCA at this time, discussed need to transition off PCA to PO medications only with IV for BTP     Objective: General: alert, cooperative, no distress. Neuro/Vascular: CNS Intact. Sensation stable. Brisk cap refill, 2+ pulses UE/LE  Musculoskeletal:  FROM UE/LLE with limited ROM of the R knee. Yasmeen's sign negative in bilateral lower extremities.   Calves soft, supple, non-tender upon palpation or with passive stretch. Skin: Incision - clean, dry and intact. No significant erythema or swelling. Dressing: + bloody drainage noted     Ferris - y  Drain - n       PT/OT:   Gait:                      Assessment:    s/p Procedure(s): FEMUR INTRA MEDULLARY NAILING RETROGRADE SUPRACHONDYLAR- RIGHT FEMUR    Active Problems:    Femur fracture, right ([address] (4/4/2017)         Plan:     -  Continue PT/OT - TTWB RLE   -  Continue established methods of pain control -  Discussed with Kimberly Alvares NP plan to transition to PO medications. Dr. Belva Hashimoto prefers Oxycodone IR 5-10 mg   -  VTE Prophylaxes - TEDS &/or SCDs with Xarelto   -  DC Ferris today        Discharge To:  [facility] consult in place     Dr. Belva Hashimoto aware and agree with plan.      Signed By: Marianela East NP    Orthopedic Nurse Practitioner

## 2017-04-07 NOTE — OP NOTES
[address], [facility]   OP NOTE       Name:  Kandi Gooden   MR#:  [id number]   :  1960   Account #:  [de-identified]    Surgery Date:  2017   Date of Adm:  2017       PREOPERATIVE DIAGNOSIS: Right pathologic femur fracture,   closed. POSTOPERATIVE DIAGNOSIS: Right pathologic femur fracture,   closed. PROCEDURES PERFORMED   1. Use of intraoperative fluoroscopy for localization of biopsy. 2. Biopsy right femur metastatic lesion. ANESTHESIA: General.    SURGEON: Leonel Castorena MD    ESTIMATED BLOOD LOSS: Minimal.    DRAINS: None. SPECIMENS REMOVED: Multiple frozen and permanent sections for   pathology. COMPLICATIONS: None. CONDITION: Stable to PACU. INDICATIONS: A 58-year-old female who had previously been   diagnosed with lung cancer and undergone lobectomy. She apparently   had a clean PET scan in December which may not have included her   distal femur. She presented with a right femur fracture after no   significant trauma. Radiographs were consistent with a pathologic   fracture. We discussed her case with an orthopedic oncologist at [facility]   who recommended biopsy prior to surgery. If the biopsy confirmed this   was a metastatic lesion from her lung cancer, the recommended   treatment is intramedullary nail fixation using a long nail across the   entirety of the femur. She understood no guarantees could be given   about the outcome and that she may have significant difficulty healing   with her cancerous lesion. DESCRIPTION OF PROCEDURE: After being identified in the   preoperative holding area and having her operative site marked, the   patient was brought back to the operating room where she underwent   general anesthesia. She was transferred to an OR bed. The right lower   extremity was prepped and draped in the usual fashion using sterile   technique. Appropriate time-out was performed.  We localized the   fracture site using fluoroscopy. A stab incision was made laterally   through the skin and IT band. A pituitary punch was then passed to the   fracture site and multiple biopsy samples were obtained. These were   sent as fresh samples to our pathologist who immediately examined   them and confirmed the same diagnosis as pathology from her lung   resection at another hospital last year. We irrigated the stab wound   and closed it with interrupted nylon. Sterile compressive dressings   were applied. She was then transferred to her hospital bed and taken   to the recovery room in stable condition.         Carmen Michaud MD      [address] [place] [address] / Clearance [employer]   D:  04/06/2017   16:08   T:  04/06/2017   21:34   Job #:  [id number]

## 2017-04-08 NOTE — ROUTINE PROCESS
Bedside and Verbal shift change report given to Taiwo Cabral RN (oncoming nurse) by [facility], RN (offgoing nurse). Report included the following information SBAR, Kardex, Intake/Output, MAR, Recent Results and Med Rec Status.

## 2017-04-08 NOTE — PROGRESS NOTES
Hematology Oncology Progress Note    Follow up for: Lung ca    Chart notes reviewed since last visit. Case discussed with following:     Patient complains of the following:working with PT, pain under control    Additional concerns noted by the staff:     Patient Vitals for the past 24 hrs:   BP Temp Pulse Resp SpO2   04/08/17 1249 119/56 99.1 °F (37.3 °C) 96 18 96 %   04/08/17 0755 113/56 99.6 °F (37.6 °C) 90 18 (!) 89 %   04/08/17 0416 160/86 99.6 °F (37.6 °C) 92 18 94 %   04/07/17 2348 92/52 98.4 °F (36.9 °C) 81 18 92 %   04/07/17 1928 114/70 98.9 °F (37.2 °C) 94 18 94 %   04/07/17 1645 127/63 99.2 °F (37.3 °C) 97 18 97 %       Review of Systems:  12 point ROS done and negative except as above    Physical Examination:  Gen NAD  CV reg  Lungs clear  abd benign  R leg swollen appropriately; wounds look good    Labs:  Recent Results (from the past 24 hour(s))   METABOLIC PANEL, BASIC    Collection Time: 04/08/17  3:53 AM   Result Value Ref Range    Sodium 140 136 - 145 mmol/L    Potassium 3.9 3.5 - 5.1 mmol/L    Chloride 104 97 - 108 mmol/L    CO2 25 21 - 32 mmol/L    Anion gap 11 5 - 15 mmol/L    Glucose 88 65 - 100 mg/dL    BUN 19 6 - 20 MG/DL    Creatinine 1.01 0.55 - 1.02 MG/DL    BUN/Creatinine ratio 19 12 - 20      GFR est AA >60 >60 ml/min/1.73m2    GFR est non-AA 56 (L) >60 ml/min/1.73m2    Calcium 8.4 (L) 8.5 - 10.1 MG/DL       Imaging:  Bone scan  IMPRESSION: Uptake in the right femur where there is a known fracture. Uptake  right lateral rib, likely fracture. No evidence of metastatic disease.     Path from femur fx   Right femur, bone biopsy:   Malignant epithelioid and spindle cell neoplasm, consistent with metastatic sarcomatoid carcinoma, see comment     Assessment and Plan:   Lung Ca  -has been getting adjuvant chemo after surgery  -on hold for now  -plan per Dr. Makayla Ates    Femur fx  -bx positive for malignancy, path is going to try to compare it to path from lung resection done at [facility]  -had surgery yesterday    PE  -was put back on Xarelto this morning    She is anxious for a plan. We discussed how this is not good news. We'll do some more pathologic eval on her tissue looking for gene mutations we can exploit. I don't think we should continue the current chemotherapy. I think this is evidence of progression.

## 2017-04-08 NOTE — PROGRESS NOTES
Hospitalist Progress Note    NAME: Jacek Davis   :  1960   MRN:  [id number]       Interim Hospital Summary: 61 y.o. female whom presented on 2017 with      Assessment / Plan:  Femur fracture, right ([address] (2017), likely pathologic POA  - unable to ambulate with acute severe pain after a 'popping sound\". No known trauma  -CT showed distal R femur fracture s/p R femur biopsy on  positive for malignancy, unclear if this is the same pathology as her lung ca. She underwent femur intramedullary nailing on .    -off pca, now on oral pain meds  -PT/OT/CM/rehab on discharge, awaiting pt's choice and acceptance     Recent pulmonary embolism 1 month ago POA diagnosed at [address]  -resuming xarelto    Lung cancer RLL POA s/p resection at [facility]  -currently receiving adjuvant chemo after surgery at [address] with [address] Natalie Bates  -no prior mets, body PET scan (no LE extremities) in 2016 was negative. Bone scan neg for mets  -Oncology following, appreciate recs     Elevated BP with diagnosis of HTN POA  -stable  -PRN labetalol     Anxiety/depression POA  -continue lexapro and wellbutrin      Hyperlipidemia POA  -continue statin     DVT prophylaxis: xarelto     Stress ulcer prophylaxis: Not indicated     Code status: Full code     Subjective:     Chief Complaint / Reason for Physician Visit  No acute complaints. Pain is well control on PCA. Appetite normal.  Seen up with PT/OT. Discussed with RN events overnight. Review of Systems:  Symptom Y/N Comments  Symptom Y/N Comments   Fever/Chills n   Chest Pain n    Poor Appetite n   Edema n    Cough n   Abdominal Pain n    Sputum n   Joint Pain y    SOB/LEE n   Pruritis/Rash     Nausea/vomit    Tolerating PT/OT     Diarrhea    Tolerating Diet     Constipation    Other       Could NOT obtain due to:      Objective:     VITALS:   Last 24hrs VS reviewed since prior progress note.  Most recent are:  Patient Vitals for the past 24 hrs:   Temp Pulse Resp BP SpO2   04/08/17 0755 99.6 °F (37.6 °C) 90 18 113/56 (!) 89 %   04/08/17 0416 99.6 °F (37.6 °C) 92 18 160/86 94 %   04/07/17 2348 98.4 °F (36.9 °C) 81 18 92/52 92 %   04/07/17 1928 98.9 °F (37.2 °C) 94 18 114/70 94 %   04/07/17 1645 99.2 °F (37.3 °C) 97 18 127/63 97 %       Intake/Output Summary (Last 24 hours) at 04/08/17 1206  Last data filed at 04/07/17 2155   Gross per 24 hour   Intake                0 ml   Output             1000 ml   Net            -1000 ml        PHYSICAL EXAM:  General: WD, WN. Alert, cooperative, no acute distress    EENT:  EOMI. Anicteric sclerae. MMM  Resp:  CTA bilaterally, no wheezing or rales. No accessory muscle use  CV:  Regular  rhythm,  No edema  GI:  Soft, Non distended, Non tender.  +Bowel sounds  Neurologic:  Alert and oriented X 3, normal speech,   Psych:   Good insight. Not anxious nor agitated  Skin:  No rashes. No jaundice    Reviewed most current lab test results and cultures  YES  Reviewed most current radiology test results   YES  Review and summation of old records today    NO  Reviewed patient's current orders and MAR    YES  PMH/SH reviewed - no change compared to H&P  ________________________________________________________________________  Care Plan discussed with:    Comments   Patient x    Family      RN x    Care Manager     Consultant                        Multidiciplinary team rounds were held today with , nursing, pharmacist and clinical coordinator. Patient's plan of care was discussed; medications were reviewed and discharge planning was addressed.      ________________________________________________________________________  Total NON critical care TIME:  35   Minutes    Total CRITICAL CARE TIME Spent:   Minutes non procedure based      Comments   >50% of visit spent in counseling and coordination of care     ________________________________________________________________________  Jonny Adams MD     Procedures: see electronic medical records for all procedures/Xrays and details which were not copied into this note but were reviewed prior to creation of Plan. LABS:  I reviewed today's most current labs and imaging studies.   Pertinent labs include:  Recent Labs      04/07/17 0422 04/06/17 0316   WBC  8.1  7.6   HGB  9.4*  8.5*   HCT  29.6*  27.1*   PLT  204  234     Recent Labs      04/08/17   0353  04/07/17 0422 04/06/17 0316   NA  140  139  141   K  3.9  4.8  4.4   CL  104  105  107   CO2  25  25  28   GLU  88  129*  90   BUN  19  18  21*   CREA  1.01  0.91  0.93   CA  8.4*  8.4*  8.4*       Signed: Wendi Van MD

## 2017-04-08 NOTE — PROGRESS NOTES
Orthopedic NP Progress Note  Post Op day: 2 Days Post-Op    April 8, 2017 9:03 AM     Minesh Varma    Attending Physician: Treatment Team: Attending Provider: Belia Becerril MD; Consulting Provider: Laqueta Osgood, NP; Consulting Provider: Natalee Rausch MD; Consulting Provider: Trenton Harris MD; Consulting Provider: Fox Granados MD; Care Manager: Mandy Bradshaw; Utilization Review: [facility]     Vital Signs:    Patient Vitals for the past 8 hrs:   BP Temp Pulse Resp SpO2   04/08/17 0755 113/56 99.6 °F (37.6 °C) 90 18 (!) 89 %   04/08/17 0416 160/86 99.6 °F (37.6 °C) 92 18 94 %     BMI (calculated): 29 (04/05/17 0620)    Intake/Output:     04/06 1901 - 04/08 0700  In: -   Out: 2250 [Urine:2250]    Pain Control:   Pain Assessment  Pain Scale 1: Numeric (0 - 10)  Pain Intensity 1: 7  Pain Location 1: Knee  Pain Orientation 1: Right  Pain Description 1: Aching  Pain Intervention(s) 1: Medication (see MAR)    LAB:    Recent Labs      04/07/17   0422   HCT  29.6*   HGB  9.4*     Lab Results   Component Value Date/Time    Sodium 140 04/08/2017 03:53 AM    Potassium 3.9 04/08/2017 03:53 AM    Chloride 104 04/08/2017 03:53 AM    CO2 25 04/08/2017 03:53 AM    Glucose 88 04/08/2017 03:53 AM    BUN 19 04/08/2017 03:53 AM    Creatinine 1.01 04/08/2017 03:53 AM    Calcium 8.4 04/08/2017 03:53 AM       Subjective:  Dariana Wyatt is a 61 y.o. female s/p a  Procedure(s): FEMUR INTRA MEDULLARY NAILING RETROGRADE SUPRACHONDYLAR- RIGHT FEMUR   Procedure(s): FEMUR INTRA MEDULLARY NAILING RETROGRADE SUPRACHONDYLAR- RIGHT FEMUR. Tolerating diet. Pain is well managed this AM, no BM as of yet. Pt has been up with PT and to [facility]     Objective: General: alert, cooperative, no distress. Gastrointestinal:  Soft, non-tender. Neuro/Vascular: CNS Intact. Sensation stable. Brisk cap refill, 2+ pulses UE/LE  Musculoskeletal:  FROM UE/LLE with limited ROM of R knee. Yasmeen's sign negative in bilateral lower extremities.   Calves soft, supple, non-tender upon palpation or with passive stretch. Skin: Incision - clean, dry and intact. No significant erythema or swelling. Dressing: + dry bloody drainage    Ferris - n  Drain - n       PT/OT:   Gait:  Gait  Base of Support: Shift to left  Gait Abnormalities: Antalgic, Decreased step clearance (hop)  Ambulation - Level of Assistance: Contact guard assistance  Distance (ft): 6 Feet (ft)  Assistive Device: Gait belt, Walker, rolling                   Assessment:    s/p Procedure(s):   FEMUR INTRA MEDULLARY NAILING RETROGRADE SUPRACHONDYLAR- RIGHT FEMUR    Active Problems:    Femur fracture, right ([facility] [address] (4/4/2017)         Plan:     -  Continue PT/OT - TTWB RLE   -  Continue established methods of pain control - PCA has been discontinued, on PO norco 10 mg or percocet 10 mg with benadryl for itching   -  VTE Prophylaxes - TEDS &/or SCDs with Xarelto  -  Post Op Constipation - miralax with dose of MOM and dulcolax supp today        Discharge To:  Consult for SAH/ acute inpt rehab     Pt seen with Dr. Rissa Abbott By: Richelle Crook NP    Orthopedic Nurse Practitioner

## 2017-04-08 NOTE — PROGRESS NOTES
Bedside shift change report given to [address] [place] [address] (oncoming nurse) by Dayan Saez RN (offgoing nurse). Report included the following information SBAR, Kardex, Intake/Output, MAR, Accordion and Recent Results.

## 2017-04-08 NOTE — PROGRESS NOTES
Problem: Mobility Impaired (Adult and Pediatric)  Goal: *Acute Goals and Plan of Care (Insert Text)  Physical Therapy Goals  Initiated 4/7/2017  1. Patient will move from supine to sit and sit to supine in bed with supervision/set-up within 7 day(s). 2. Patient will transfer from bed to chair and chair to bed with supervision/set-up using the least restrictive device within 7 day(s). 3. Patient will perform sit to stand with supervision/set-up within 7 day(s). 4. Patient will ambulate with minimal assistance/contact guard assist for 40 feet with the least restrictive device within 7 day(s). PHYSICAL THERAPY TREATMENT  Patient: Misty Prado (40 y.o. female)  Date: 4/8/2017  Diagnosis: Femur fracture, right (HCC)  Right Femur Fracture  PATHOLOGIC FRACTURE RIGHT FEMUR <principal problem not specified>  Procedure(s) (LRB):  FEMUR INTRA MEDULLARY NAILING RETROGRADE SUPRACHONDYLAR- RIGHT FEMUR (Right) 2 Days Post-Op  Precautions: TTWB, Fall      ASSESSMENT:  Pt's bed mobility and SQPT from bed to [facility] required min A for R LE management but pt managed trunk independently, with dependence on UEs. Right distal thigh pain was limiting factor in mobility and pt's pain was better controlled when maintaining full knee ext with PT's help. She opted for NWB on R during gait with occasional TTWB for balance; hop-to pattern on left LE with slow pace and frequent standing breaks. Continue to recommend inpatient rehab upon D/C; pt seemed a bit concerned when educated re: 3 hour participation. Progression toward goals:  [ ]    Improving appropriately and progressing toward goals  [X]    Improving slowly and progressing toward goals  [ ]    Not making progress toward goals and plan of care will be adjusted       PLAN:  Patient continues to benefit from skilled intervention to address the above impairments. Continue treatment per established plan of care.   Discharge Recommendations:  Inpatient Rehab  Further Equipment Recommendations for Discharge:  TBD by facility       SUBJECTIVE:   Patient stated I'm still sleepy; can't find my words.       OBJECTIVE DATA SUMMARY:   Critical Behavior:  Neurologic State: Alert  Orientation Level: Appropriate for age  Cognition: Appropriate safety awareness  Safety/Judgement: Awareness of environment, Fall prevention  Functional Mobility Training:  Bed Mobility:     Supine to Sit: Minimum assistance     Scooting: Minimum assistance        Transfers:  Sit to Stand: Minimum assistance  Stand to Sit: Contact guard assistance  Stand Pivot Transfers: Minimum assistance (for R LE ext during SQPT bed to [facility])     Bed to Chair: Minimum assistance                    Balance:  Sitting: Intact  Standing: Impaired; Without support  Standing - Static: Good;Constant support  Standing - Dynamic : Fair  Ambulation/Gait Training:  Distance (ft): 24 Feet (ft)  Assistive Device: Walker, rolling;Gait belt  Ambulation - Level of Assistance: Contact guard assistance     Gait Description (WDL): Exceptions to WDL  Gait Abnormalities: Antalgic; Step to gait; Decreased step clearance        Base of Support: Shift to left  Stance: Right decreased  Speed/Penny: Slow  Step Length: Left shortened                       Neuro Re-Education:     Therapeutic Exercises: Ankle pumps seated x10  Pain:  Pain Scale 1: Numeric (0 - 10)  Pain Intensity 1: 7  Pain Location 1: Hip;Knee  Pain Orientation 1: Right  Pain Description 1: Aching  Pain Intervention(s) 1: Medication (see MAR); Cold pack  Activity Tolerance:      Please refer to the flowsheet for vital signs taken during this treatment.   After treatment:   [X]    Patient left in no apparent distress sitting up in chair  [ ]    Patient left in no apparent distress in bed  [X]    Call bell left within reach  [X]    Nursing notified  [ ]    Caregiver present  [ ]    Bed alarm activated      COMMUNICATION/COLLABORATION:   The patients plan of care was discussed with: Registered Nurse     Tatiana Damico PT, DPT   Time Calculation: 24 mins

## 2017-04-08 NOTE — PROGRESS NOTES
Problem: Mobility Impaired (Adult and Pediatric)  Goal: *Acute Goals and Plan of Care (Insert Text)  Physical Therapy Goals  Initiated 4/7/2017  1. Patient will move from supine to sit and sit to supine in bed with supervision/set-up within 7 day(s). 2. Patient will transfer from bed to chair and chair to bed with supervision/set-up using the least restrictive device within 7 day(s). 3. Patient will perform sit to stand with supervision/set-up within 7 day(s). 4. Patient will ambulate with minimal assistance/contact guard assist for 40 feet with the least restrictive device within 7 day(s). PHYSICAL THERAPY TREATMENT  Patient: Vivi Pang (73 y.o. female)  Date: 4/8/2017  Diagnosis: Femur fracture, right (HCC)  Right Femur Fracture  PATHOLOGIC FRACTURE RIGHT FEMUR <principal problem not specified>  Procedure(s) (LRB):  FEMUR INTRA MEDULLARY NAILING RETROGRADE SUPRACHONDYLAR- RIGHT FEMUR (Right) 2 Days Post-Op  Precautions: TTWB, Fall      ASSESSMENT:  Pt was asleep again at start of afternoon session but agreeable to PT; stating she's been unusually drowsy today. Continues to require min A for R LE management due to increased pain with any amount of knee flexion. Notified nurse that pt may benefit from brace/immobilizer for pain control and knee support. Ambulated with RW to door and back around to far side of bed with occasional rest breaks and CGA; returned to bed due to fatigue and pt wishing to return to sleep. Pt will benefit from rehab setting to improve endurance and safe mobility prior to D/C home; lives alone with family nearby. Progression toward goals:  [ ]    Improving appropriately and progressing toward goals  [X]    Improving slowly and progressing toward goals  [ ]    Not making progress toward goals and plan of care will be adjusted       PLAN:  Patient continues to benefit from skilled intervention to address the above impairments.   Continue treatment per established plan of care.  Discharge Recommendations:  Inpatient Rehab  Further Equipment Recommendations for Discharge:  TBD by facility       SUBJECTIVE:   Patient stated I've been very sleepy today for some reason.       OBJECTIVE DATA SUMMARY:   Critical Behavior:  Neurologic State: Alert  Orientation Level: Appropriate for age  Cognition: Appropriate safety awareness  Safety/Judgement: Awareness of environment, Fall prevention  Functional Mobility Training:  Bed Mobility:  Rolling: Minimum assistance  Supine to Sit: Minimum assistance  Sit to Supine: Minimum assistance  Scooting: Minimum assistance        Transfers:  Sit to Stand: Contact guard assistance;Stand-by asssistance  Stand to Sit: Contact guard assistance  Stand Pivot Transfers: Minimum assistance (for R LE ext during SQPT bed to [facility])     Bed to Chair: Minimum assistance           Balance:  Sitting: Intact  Standing: Impaired; With support  Standing - Static: Good;Constant support  Standing - Dynamic : Fair  Ambulation/Gait Training:  Distance (ft): 24 Feet (ft)  Assistive Device: Gait belt;Walker, rolling  Ambulation - Level of Assistance: Contact guard assistance     Gait Description (WDL): Exceptions to WDL  Gait Abnormalities: Antalgic;Decreased step clearance        Base of Support: Shift to left  Stance: Right decreased  Speed/Penny: Pace decreased (<100 feet/min)  Step Length: Left shortened                       Therapeutic Exercises: Ankle pumps x10 and hip abd AAROM   Pain:  Pain Scale 1: Numeric (0 - 10)  Pain Intensity 1: 7  Pain Location 1: Hip;Knee  Pain Orientation 1: Right  Pain Description 1: Aching  Pain Intervention(s) 1: Medication (see MAR); Cold pack  Activity Tolerance:      Please refer to the flowsheet for vital signs taken during this treatment.   After treatment:   [ ]    Patient left in no apparent distress sitting up in chair  [X]    Patient left in no apparent distress in bed  [X]    Call bell left within reach  [X]    Nursing notified  [ ]    Caregiver present  [ ]    Bed alarm activated      COMMUNICATION/COLLABORATION:   The patients plan of care was discussed with: Registered Nurse     Edith Figueroa, PT, DPT   Time Calculation: 20 mins

## 2017-04-08 NOTE — ROUTINE PROCESS
Bedside and Verbal shift change report given to Elizabeth Zambrano RN  (oncoming nurse) by FRENCH Gutiérrez (offgoing nurse). Report included the following information SBAR, Kardex, Intake/Output, MAR, Recent Results and Med Rec Status.

## 2017-04-09 NOTE — PROGRESS NOTES
Problem: Mobility Impaired (Adult and Pediatric)  Goal: *Acute Goals and Plan of Care (Insert Text)  Physical Therapy Goals  Initiated 4/7/2017  1. Patient will move from supine to sit and sit to supine in bed with supervision/set-up within 7 day(s). 2. Patient will transfer from bed to chair and chair to bed with supervision/set-up using the least restrictive device within 7 day(s). 3. Patient will perform sit to stand with supervision/set-up within 7 day(s). 4. Patient will ambulate with minimal assistance/contact guard assist for 40 feet with the least restrictive device within 7 day(s). PHYSICAL THERAPY TREATMENT  Patient: Leonard Milligan (26 y.o. female)  Date: 4/9/2017  Diagnosis: Femur fracture, right (HCC)  Right Femur Fracture  PATHOLOGIC FRACTURE RIGHT FEMUR <principal problem not specified>  Procedure(s) (LRB):  FEMUR INTRA MEDULLARY NAILING RETROGRADE SUPRACHONDYLAR- RIGHT FEMUR (Right) 3 Days Post-Op  Precautions: TTWB, Fall      ASSESSMENT: Patient demonstrating slow steady progress in therapy. Patient requires increased encouragement to participate in therapy but is agreeable to ambulation within room. Patient requiring min A to support RLE during bed mobility and requires increased time to complete all mobility tasks. Patient requiring CGA for transfers and ambulation. Gait is slow but steady overall using RW for support. Patient maintaining NWB status on R. She fatigues quickly and requires several standing rests  Patient lives alone in 2 story home. She would benefit from further rehab prior to returning to home. Recommend inpatient rehab vs SNF. Progression toward goals:  [ ]    Improving appropriately and progressing toward goals  [X]    Improving slowly and progressing toward goals  [ ]    Not making progress toward goals and plan of care will be adjusted       PLAN:  Patient continues to benefit from skilled intervention to address the above impairments.   Continue treatment per established plan of care. Discharge Recommendations:  Rehab inpatient vs SNF  Further Equipment Recommendations for Discharge:  TBD by SNF       SUBJECTIVE:   Patient stated I'm so afraid of the pain.       OBJECTIVE DATA SUMMARY:   Critical Behavior:  Neurologic State: Alert  Orientation Level: Appropriate for age  Cognition: Appropriate decision making  Safety/Judgement: Awareness of environment, Fall prevention  Functional Mobility Training:  Bed Mobility:     Supine to Sit: Minimum assistance; Additional time (to support RLE)     Scooting: Minimum assistance; Additional time (to support RLE)        Transfers:  Sit to Stand: Contact guard assistance  Stand to Sit: Contact guard assistance                             Balance:  Sitting: Intact  Standing: Impaired  Standing - Static: Good;Constant support  Standing - Dynamic : Fair (using RW for support)  Ambulation/Gait Training:  Distance (ft): 60 Feet (ft) (with 3 brief standing rests)  Assistive Device: Walker, rolling;Gait belt  Ambulation - Level of Assistance: Contact guard assistance        Gait Abnormalities: Decreased step clearance        Base of Support: Shift to left     Speed/Penny: Pace decreased (<100 feet/min)  Step Length: Left shortened      Gait is slow but steady overall with no LOB noted; patient maintaining NWB on R stating it is too difficult to just touch her foot to the floor        Pain:  Pain Scale 1: Numeric (0 - 10)  Pain Intensity 1: 7           Pain Intervention(s) 1: Medication (see MAR)  Activity Tolerance:   Pulse Oximetry Assessment     97% at rest on room air  97% at rest on 1LPM     Please refer to the flowsheet for vital signs taken during this treatment.   After treatment:   [X]    Patient left in no apparent distress sitting up in chair  [ ]    Patient left in no apparent distress in bed  [X]    Call bell left within reach  [X]    Nursing notified  [ ]    Caregiver present  [ ]    Bed alarm activated COMMUNICATION/COLLABORATION:   The patients plan of care was discussed with: Registered Nurse     Gertrude Benites, PT   Time Calculation: 27 mins

## 2017-04-09 NOTE — PROGRESS NOTES
Problem: Mobility Impaired (Adult and Pediatric)  Goal: *Acute Goals and Plan of Care (Insert Text)  Physical Therapy Goals  Initiated 4/7/2017  1. Patient will move from supine to sit and sit to supine in bed with supervision/set-up within 7 day(s). 2. Patient will transfer from bed to chair and chair to bed with supervision/set-up using the least restrictive device within 7 day(s). 3. Patient will perform sit to stand with supervision/set-up within 7 day(s). 4. Patient will ambulate with minimal assistance/contact guard assist for 40 feet with the least restrictive device within 7 day(s). PHYSICAL THERAPY TREATMENT  Patient: Ru Aguilar (35 y.o. female)  Date: 4/9/2017  Diagnosis: Femur fracture, right (HCC)  Right Femur Fracture  PATHOLOGIC FRACTURE RIGHT FEMUR <principal problem not specified>  Procedure(s) (LRB):  FEMUR INTRA MEDULLARY NAILING RETROGRADE SUPRACHONDYLAR- RIGHT FEMUR (Right) 3 Days Post-Op  Precautions: TTWB, Fall      ASSESSMENT: Patient progressing slowly. Pleasant and cooperative but continues to require increased encouragement to participate and refusing ambulation in [place] today. Patient demonstrating poor tolerance to any ROM of R knee and requires support of R LE during transfers. Patient requiring min A for transfers and bed mobiltiy but only CGA for short distance ambulation in room. Patient would benefit from further rehab following discharge but uncertain patient would tolerate the intensity of inpatient rehab. Progression toward goals:  [ ]    Improving appropriately and progressing toward goals  [X]    Improving slowly and progressing toward goals  [ ]    Not making progress toward goals and plan of care will be adjusted       PLAN:  Patient continues to benefit from skilled intervention to address the above impairments. Continue treatment per established plan of care.   Discharge Recommendations:  Rehab- inpatient vs SNF- uncertain if patient will be able to tolerate the intensity of inpatient rehab  Further Equipment Recommendations for Discharge:  rolling walker       SUBJECTIVE:   Patient stated I'm really tired this afternoon.       OBJECTIVE DATA SUMMARY:   Critical Behavior:  Neurologic State: Alert  Orientation Level: Oriented X4  Cognition: Appropriate safety awareness, Follows commands  Safety/Judgement: Awareness of environment, Fall prevention  Functional Mobility Training:  Bed Mobility:     Supine to Sit: Minimum assistance; Additional time (to support RLE)  Sit to Supine: Contact guard assistance (to support RLE)  Scooting: Minimum assistance (to support RLE)        Transfers:  Sit to Stand: Minimum assistance (to support RLE)  Stand to Sit: Contact guard assistance        Bed to Chair: Minimum assistance                    Balance:  Sitting: Intact  Standing: Impaired  Standing - Static: Good;Constant support  Standing - Dynamic : Fair (using RW for support)  Ambulation/Gait Training:  Distance (ft): 40 Feet (ft) (10 feet then 30 feet)  Assistive Device: Walker, rolling;Gait belt  Ambulation - Level of Assistance: Contact guard assistance        Gait Abnormalities: Decreased step clearance        Base of Support: Shift to left     Speed/Penny: Pace decreased (<100 feet/min)  Step Length: Left shortened      Gait is slow but steady overall with no LOB noted;        Pain:  Pain Scale 1: Numeric (0 - 10)  Pain Intensity 1: 7  Pain Location 1: Knee  Pain Orientation 1: Right     Pain Intervention(s) 1: Medication (see MAR)  Activity Tolerance:   Hypotensive in standing but improved with activity. Patient noted to be very pale during pm tx session  Please refer to the flowsheet for vital signs taken during this treatment.   After treatment:   [ ]    Patient left in no apparent distress sitting up in chair  [X]    Patient left in no apparent distress in bed  [X]    Call bell left within reach  [X]    Nursing notified  [ ]    Caregiver present  [ ]    Bed alarm activated      COMMUNICATION/COLLABORATION:   The patients plan of care was discussed with: Registered Nurse and Rehabilitation Attendant     Stefan Infante PT   Time Calculation: 21 mins

## 2017-04-09 NOTE — PROGRESS NOTES
Bedside and Verbal shift change report given to devan tillman rn (oncoming nurse) by Chloe Sams (offgoing nurse). Report included the following information SBAR, Kardex, OR Summary, Procedure Summary, Intake/Output, MAR and Accordion.

## 2017-04-09 NOTE — PROGRESS NOTES
Hematology Oncology Progress Note    Follow up for: Lung ca    Chart notes reviewed since last visit. Case discussed with following:     Patient complains of the following:working with PT, pain under control    Additional concerns noted by the staff:     Patient Vitals for the past 24 hrs:   BP Temp Pulse Resp SpO2   04/09/17 0736 126/62 99.1 °F (37.3 °C) 82 16 96 %   04/09/17 0734 - - - - (!) 88 %   04/08/17 2130 - 99.3 °F (37.4 °C) - - -   04/08/17 1946 116/56 (!) 101.1 °F (38.4 °C) 91 18 95 %   04/08/17 1650 110/62 100.2 °F (37.9 °C) (!) 104 18 (!) 89 %       Review of Systems:  12 point ROS done and negative except as above    Physical Examination:  Gen NAD  CV reg  Lungs clear  abd benign  R leg swollen appropriately; wounds look good    Labs:  Recent Results (from the past 24 hour(s))   METABOLIC PANEL, BASIC    Collection Time: 04/09/17  4:19 AM   Result Value Ref Range    Sodium 140 136 - 145 mmol/L    Potassium 4.1 3.5 - 5.1 mmol/L    Chloride 104 97 - 108 mmol/L    CO2 26 21 - 32 mmol/L    Anion gap 10 5 - 15 mmol/L    Glucose 84 65 - 100 mg/dL    BUN 16 6 - 20 MG/DL    Creatinine 0.88 0.55 - 1.02 MG/DL    BUN/Creatinine ratio 18 12 - 20      GFR est AA >60 >60 ml/min/1.73m2    GFR est non-AA >60 >60 ml/min/1.73m2    Calcium 8.6 8.5 - 10.1 MG/DL       Imaging:  Bone scan  IMPRESSION: Uptake in the right femur where there is a known fracture. Uptake  right lateral rib, likely fracture. No evidence of metastatic disease. Path from femur fx   Right femur, bone biopsy:   Malignant epithelioid and spindle cell neoplasm, consistent with metastatic sarcomatoid carcinoma, see comment     Assessment and Plan:   Lung Ca  -has been getting adjuvant chemo after surgery  -on hold for now  -plan per Dr. Makayla Hampton    Femur fx  -bx positive for malignancy, path is going to try to compare it to path [facility]  -had surgery yesterday    PE  Leg looks good.  Now has fever; cxr urinalysis, paired blood cultures. Hopefully this will pass and she can go to rehab soon. On oxygen due to pleural effusion and/or some fat embolism.

## 2017-04-09 NOTE — PROGRESS NOTES
Hospitalist Progress Note    NAME: Itzel Angulo   :  1960   MRN:  [id number]       Interim Hospital Summary: 61 y.o. female whom presented on 2017 with      Assessment / Plan:    Fever with hypoxia  -concerning for HCAP? Vs. Post op fever  -check CXR, BC, UA  -nontoxic appearing, on 1LNC  -hold Abx until further test result    Femur fracture, right ([address] (2017), likely pathologic POA  - unable to ambulate with acute severe pain after a 'popping sound\". No known trauma  -CT showed distal R femur fracture s/p R femur biopsy on  positive for malignancy, unclear if this is the same pathology as her lung ca. She underwent femur intramedullary nailing on .    -off pca, now on oral pain meds  -PT/OT/CM/rehab on discharge, awaiting pt's choice and acceptance     Recent pulmonary embolism 1 month ago POA diagnosed at [address] [place] [address]  -resuming xarelto    Lung cancer RLL POA s/p resection at [facility] [address] 2016  -currently receiving adjuvant chemo after surgery at [address] with [address] Batres Males  -no prior mets, body PET scan (no LE extremities) in 2016 was negative. Bone scan neg for mets  -Oncology following, appreciate recs     Elevated BP with diagnosis of HTN POA  -stable  -PRN labetalol     Anxiety/depression POA  -continue lexapro and wellbutrin      Hyperlipidemia POA  -continue statin     DVT prophylaxis: xarelto     Stress ulcer prophylaxis: Not indicated     Code status: Full code     Subjective:     Chief Complaint / Reason for Physician Visit  No acute complaints. .    Discussed with RN events overnight.      Review of Systems:  Symptom Y/N Comments  Symptom Y/N Comments   Fever/Chills y   Chest Pain n    Poor Appetite n   Edema n    Cough n   Abdominal Pain n    Sputum n   Joint Pain y    SOB/LEE n   Pruritis/Rash     Nausea/vomit    Tolerating PT/OT     Diarrhea    Tolerating Diet     Constipation    Other       Could NOT obtain due to:      Objective:     VITALS:   Last 24hrs VS reviewed since prior progress note. Most recent are:  Patient Vitals for the past 24 hrs:   Temp Pulse Resp BP SpO2   04/09/17 0736 99.1 °F (37.3 °C) 82 16 126/62 96 %   04/09/17 0734 - - - - (!) 88 %   04/08/17 2130 99.3 °F (37.4 °C) - - - -   04/08/17 1946 (!) 101.1 °F (38.4 °C) 91 18 116/56 95 %   04/08/17 1650 100.2 °F (37.9 °C) (!) 104 18 110/62 (!) 89 %   04/08/17 1249 99.1 °F (37.3 °C) 96 18 119/56 96 %       Intake/Output Summary (Last 24 hours) at 04/09/17 1054  Last data filed at 04/09/17 0709   Gross per 24 hour   Intake                0 ml   Output              225 ml   Net             -225 ml        PHYSICAL EXAM:  General: WD, WN. Alert, cooperative, no acute distress    EENT:  EOMI. Anicteric sclerae. MMM  Resp:  CTA bilaterally, no wheezing or rales. No accessory muscle use  CV:  Regular  rhythm,  No edema  GI:  Soft, Non distended, Non tender.  +Bowel sounds  Neurologic:  Alert and oriented X 3, normal speech,   Psych:   Good insight. Not anxious nor agitated  Skin:  No rashes. No jaundice    Reviewed most current lab test results and cultures  YES  Reviewed most current radiology test results   YES  Review and summation of old records today    NO  Reviewed patient's current orders and MAR    YES  PMH/ reviewed - no change compared to H&P  ________________________________________________________________________  Care Plan discussed with:    Comments   Patient x    Family      RN x    Care Manager     Consultant                        Multidiciplinary team rounds were held today with , nursing, pharmacist and clinical coordinator. Patient's plan of care was discussed; medications were reviewed and discharge planning was addressed.      ________________________________________________________________________  Total NON critical care TIME:  35   Minutes    Total CRITICAL CARE TIME Spent:   Minutes non procedure based      Comments   >50% of visit spent in counseling and coordination of care     ________________________________________________________________________  Socoror Graves MD     Procedures: see electronic medical records for all procedures/Xrays and details which were not copied into this note but were reviewed prior to creation of Plan. LABS:  I reviewed today's most current labs and imaging studies.   Pertinent labs include:  Recent Labs      04/07/17 0422   WBC  8.1   HGB  9.4*   HCT  29.6*   PLT  204     Recent Labs      04/09/17   0419  04/08/17   0353  04/07/17 0422   NA  140  140  139   K  4.1  3.9  4.8   CL  104  104  105   CO2  26  25  25   GLU  84  88  129*   BUN  16  19  18   CREA  0.88  1.01  0.91   CA  8.6  8.4*  8.4*       Signed: Socorro Graves MD

## 2017-04-09 NOTE — PROGRESS NOTES
Orthopedic End of Shift Note    Bedside shift change report given to Chevy Amado (oncoming nurse) by Nico Laguna (offgoing nurse). Report included the following information SBAR, Kardex, OR Summary, Intake/Output, MAR and Recent Results. POD# 4    Significant issues during shift: elevated temp on night shift Dr. Wilian Araujo ordered blood cultures, U/A, chest xray related to elevated temp     Issues for Physician to address none at this time    Nausea/Vomiting [] yes [x] no     Ferris Catheter [] in [] removed voiding   Bowel Movements [x] yes [] no  4-9-17   Foot Pumps or SCD [x] yes [] no    Ice Pack [x] yes    [] no    Incentive Spirometer [x] yes [] no Volume:   900   Supplemental O2 [x] yes [] no Sat off O2:   88-90%     Patient Vitals for the past 12 hrs:   Temp Pulse Resp BP SpO2   04/09/17 1455 - 93 - 139/75 90 %   04/09/17 1448 - (!) 109 - 105/70 -   04/09/17 1444 - (!) 105 - 115/72 95 %   04/09/17 0736 99.1 °F (37.3 °C) 82 16 126/62 96 %   04/09/17 0734 - - - - (!) 88 %     Lab Results   Component Value Date/Time    HGB 9.4 04/07/2017 04:22 AM    HCT 29.6 04/07/2017 04:22 AM     Lab Results   Component Value Date/Time    INR 1.2 04/04/2017 01:24 AM       Intake/Output Summary (Last 24 hours) at 04/09/17 1902  Last data filed at 04/09/17 1804   Gross per 24 hour   Intake                0 ml   Output              525 ml   Net             -525 ml     Wound Leg Right;Medial;Lateral;Anterior (Active)   DRESSING STATUS Clean, dry, and intact 4/8/2017  7:57 PM   DRESSING TYPE Transparent film 4/8/2017  7:57 PM   Incision site well approximated?  Yes 4/7/2017  3:14 AM   Drainage Amount  Small  4/7/2017  8:48 PM   Drainage Color Sanguinous 4/7/2017  8:48 PM   Wound Odor None 4/7/2017  8:48 PM   Number of days:3       [REMOVED] Wound Leg Right;Upper (Removed)   Removed 04/06/17 1634   DRESSING STATUS Clean, dry, and intact 4/7/2017  8:00 AM   DRESSING TYPE Transparent film 4/7/2017  8:00 AM   SPLINT TYPE/MATERIAL Knee immobilizer 4/6/2017  4:42 AM   Incision site well approximated?  Yes 4/6/2017  4:42 AM   Number of days:1        Peripheral Intravenous Line:  Peripheral IV 04/04/17 Left Antecubital (Active)   Site Assessment Clean, dry, & intact 4/8/2017  7:56 PM   Phlebitis Assessment 0 4/8/2017  7:56 PM   Infiltration Assessment 0 4/8/2017  7:56 PM   Dressing Status Clean, dry, & intact 4/8/2017  7:56 PM   Dressing Type Transparent 4/8/2017  7:56 PM   Hub Color/Line Status Pink 4/8/2017  7:56 PM     Drain(s):  Post op End of Shift Nursing Note          IV Flds [] in [] removed    Ferris Catheter [] in  [] removed     Nausea [] yes [] no     Vomiting [] yes [] no    Bowel sounds [] absent [] hypoactive [] active    Bowel Movements [] yes [] no     Dressing present [] yes [] no     Wound concerns [] yes  [] no     Drains [] yes [] no    Foot Pumps or SCD [] yes [] no    Ice Pack [] yes    [] no    Incentive Spirometer [] yes [] no    Supplemental O2 [] yes [] no

## 2017-04-09 NOTE — PROGRESS NOTES
Orthopedic NP Progress Note  Post Op day: 3 Days Post-Op    April 9, 2017 8:07 AM     Cierra Varma    Attending Physician: Treatment Team: Attending Provider: Mor Albright MD; Consulting Provider: Barabara Cheadle, NP; Consulting Provider: Florence Garrido MD; Consulting Provider: Lisa Sexton MD; Consulting Provider: Una Koch MD; Care Manager: Arleth Bennett; Utilization Review: Lee Benito; Utilization Review: Anna Albert RN     Vital Signs:    Patient Vitals for the past 8 hrs:   BP Temp Pulse Resp SpO2   04/09/17 0736 126/62 99.1 °F (37.3 °C) 82 16 96 %   04/09/17 0734 - - - - (!) 88 %     BMI (calculated): 29 (04/05/17 0620)    Intake/Output:  04/09 0701 - 04/09 1900  In: -   Out: 225 [Urine:225]  04/07 1901 - 04/09 0700  In: -   Out: 300 [Urine:300]    Pain Control:   Pain Assessment  Pain Scale 1: Numeric (0 - 10)  Pain Intensity 1: 7  Pain Location 1: Hip, Knee  Pain Orientation 1: Right  Pain Description 1: Aching  Pain Intervention(s) 1: Medication (see MAR), Cold pack    LAB:    Recent Labs      04/07/17   0422   HCT  29.6*   HGB  9.4*     Lab Results   Component Value Date/Time    Sodium 140 04/09/2017 04:19 AM    Potassium 4.1 04/09/2017 04:19 AM    Chloride 104 04/09/2017 04:19 AM    CO2 26 04/09/2017 04:19 AM    Glucose 84 04/09/2017 04:19 AM    BUN 16 04/09/2017 04:19 AM    Creatinine 0.88 04/09/2017 04:19 AM    Calcium 8.6 04/09/2017 04:19 AM       Subjective:  Sylvia Yoo is a 61 y.o. female s/p a  Procedure(s): FEMUR INTRA MEDULLARY NAILING RETROGRADE SUPRACHONDYLAR- RIGHT FEMUR   Procedure(s): FEMUR INTRA MEDULLARY NAILING RETROGRADE SUPRACHONDYLAR- RIGHT FEMUR. Tolerating diet. Pain well managed this AM, has been up with PT     Objective: General: alert, cooperative, no distress. Gastrointestinal:  Soft, non-tender. Neuro/Vascular: CNS Intact. Sensation stable. Brisk cap refill, 2+ pulses UE/LE  Musculoskeletal:  FROM UE/LLE with limited ROM of R knee.  Yasmeen's sign negative in bilateral lower extremities. Calves soft, supple, non-tender upon palpation or with passive stretch. Skin: Incision - clean, dry and intact. No significant erythema or swelling. Dressing: clean, dry, and intact    Ferris - n  Drain - n       PT/OT:   Gait:  Gait  Base of Support: Shift to left  Speed/Penny: Pace decreased (<100 feet/min)  Step Length: Left shortened  Stance: Right decreased  Gait Abnormalities: Antalgic, Decreased step clearance  Ambulation - Level of Assistance: Contact guard assistance  Distance (ft): 24 Feet (ft)  Assistive Device: Gait belt, Walker, rolling                   Assessment:    s/p Procedure(s): FEMUR INTRA MEDULLARY NAILING RETROGRADE SUPRACHONDYLAR- RIGHT FEMUR    Active Problems:    Femur fracture, right ([address] (4/4/2017)         Plan:        - Continue PT/OT - TTWB RLE   - Continue established methods of pain control - PCA has been discontinued, on PO norco 10 mg or percocet 10 mg with benadryl for itching   - VTE Prophylaxes - TEDS &/or SCDs with Xarelto  - Post Op Constipation - miralax with dose of MOM and dulcolax supp yesterday, no BM as of yet monitoring     Discharge To:           aware and agree with plan.      Signed By: Richard Avery NP    Orthopedic Nurse Practitioner

## 2017-04-10 NOTE — PROGRESS NOTES
Problem: Mobility Impaired (Adult and Pediatric)  Goal: *Acute Goals and Plan of Care (Insert Text)  Physical Therapy Goals  Initiated 4/7/2017  1. Patient will move from supine to sit and sit to supine in bed with supervision/set-up within 7 day(s). 2. Patient will transfer from bed to chair and chair to bed with supervision/set-up using the least restrictive device within 7 day(s). 3. Patient will perform sit to stand with supervision/set-up within 7 day(s). 4. Patient will ambulate with minimal assistance/contact guard assist for 40 feet with the least restrictive device within 7 day(s). PHYSICAL THERAPY TREATMENT  Patient: Misty Prado (10 y.o. female)  Date: 4/10/2017  Diagnosis: Femur fracture, right (HCC)  Right Femur Fracture  PATHOLOGIC FRACTURE RIGHT FEMUR <principal problem not specified>  Procedure(s) (LRB):  FEMUR INTRA MEDULLARY NAILING RETROGRADE SUPRACHONDYLAR- RIGHT FEMUR (Right) 4 Days Post-Op  Precautions: TTWB, Fall      ASSESSMENT:  Patient making slow but steady progress toward goals. Today patient needing CGA for transfers and amb approx 60 feet with RW, CGA and maintains NWB on R LE. Patient prefers to hold LE off ground vs TTWB for pain management. Patient unsure that she can tolerate 3 Hrs of PT in inpt rehab setting at this time. Suggested patient consider SNF rehab as she will have increased time to work on therapy without the intensity. Patient agreeable to this and notified  regarding patient's decision. Anticipate continued progress. Will continue to follow. Progression toward goals:  [X]    Improving appropriately and progressing toward goals  [ ]    Improving slowly and progressing toward goals  [ ]    Not making progress toward goals and plan of care will be adjusted       PLAN:  Patient continues to benefit from skilled intervention to address the above impairments. Continue treatment per established plan of care.   Discharge Recommendations: Skilled Nursing Facility  Further Equipment Recommendations for Discharge:  TBD       SUBJECTIVE:   Patient stated I just don't think I can tolerate 3 hrs of therapy at day.       OBJECTIVE DATA SUMMARY:   Critical Behavior:  Neurologic State: Alert  Orientation Level: Oriented X4  Cognition: Appropriate for age attention/concentration  Safety/Judgement: Awareness of environment, Fall prevention  Functional Mobility Training:  Bed Mobility:   not tested, up in chair at PT arrival                 Transfers:  Sit to Stand: Contact guard assistance  Stand to Sit: Contact guard assistance                             Balance:  Sitting: Intact  Standing: Impaired  Standing - Static: Constant support;Good  Standing - Dynamic : Fair  Ambulation/Gait Training:  Distance (ft): 60 Feet (ft)  Assistive Device: Gait belt;Walker, rolling  Ambulation - Level of Assistance: Contact guard assistance        Gait Abnormalities: Antalgic;Decreased step clearance; Other (\"hop-to pattern')        Base of Support: Shift to left  Stance: Right decreased  Speed/Penny: Pace decreased (<100 feet/min); Slow  Step Length: Left shortened;Right shortened        Pain:   no reports of pain during session                 Activity Tolerance:   VSS  Please refer to the flowsheet for vital signs taken during this treatment.   After treatment:   [X]    Patient left in no apparent distress sitting up in chair  [ ]    Patient left in no apparent distress in bed  [X]    Call bell left within reach  [X]    Nursing notified  [X]    Caregiver present  [ ]    Bed alarm activated      COMMUNICATION/COLLABORATION:   The patients plan of care was discussed with: Physical Therapist, Occupational Therapist and Registered Nurse     Heide Toth, PT, DPT   Time Calculation: 21 mins

## 2017-04-10 NOTE — PROGRESS NOTES
Hematology Oncology Progress Note    Follow up for: Lung ca    Chart notes reviewed since last visit. Case discussed with following:     Patient complains of the following:   working with PT. She reports that her pain is under much better control. anticiaptes transfer to [facility]. Additional concerns noted by the staff:     Patient Vitals for the past 24 hrs:   BP Temp Pulse Resp SpO2   04/10/17 1549 101/57 98.3 °F (36.8 °C) 88 16 91 %   04/10/17 1058 100/65 98.2 °F (36.8 °C) 88 18 97 %   04/10/17 0844 100/51 98.5 °F (36.9 °C) 87 18 95 %   04/10/17 0006 100/59 98.3 °F (36.8 °C) 79 18 98 %   04/09/17 2024 108/61 97.9 °F (36.6 °C) 81 18 95 %       Review of Systems:  12 point ROS done and negative except as above    Physical Examination:  General NAD  HEENT: NC, AT, pupils round, reactive  Nck - supple. No nodes noted. CV reg  Lungs clear  abd soft,nontender, NABS, - HSM appreciated  R leg swollen appropriately; incisions clean and  look good   extr - warm  Skin dry  Labs:  No results found for this or any previous visit (from the past 24 hour(s)). Imaging:  Bone scan  IMPRESSION: Uptake in the right femur where there is a known fracture. Uptake  right lateral rib, likely fracture. No evidence of metastatic disease. Path from femur fx   Right femur, bone biopsy:   Malignant epithelioid and spindle cell neoplasm, consistent with metastatic sarcomatoid carcinoma, see comment     Assessment and Plan:   Lung Ca  -has been on postop adjuvant chemo with Dr Nikko Hooks but current pathologic finding constitutes progressive disease. Dr. Nikko Hooks has asked for path to be sent for mutation testing to see if there is a  mutation taht can be exploited. Femur fx  -bx positive for malignancy, path is going to try to compare it to path from lung resection done at [address] [place] [address]    PE  -was put back on Xarelto and appears to be tolerating. No obvious bleeding.

## 2017-04-10 NOTE — PROGRESS NOTES
Problem: Self Care Deficits Care Plan (Adult)  Goal: *Acute Goals and Plan of Care (Insert Text)  Occupational Therapy Goals  Initiated 4/7/2017  1. Patient will perform bathing seated or standing with minimal assistance/contact guard assist within 7 day(s). 2. Patient will perform lower body dressing with moderate assistance and AE within 7 day(s). 3. Patient will perform grooming standing at the sink with supervision/set-up within 7 day(s). 4. Patient will perform toilet transfers with minimal assistance/contact guard assist within 7 day(s). 5. Patient will perform all aspects of toileting with independence within 7 day(s). 6. Patient will participate in upper extremity therapeutic exercise/activities with independence for 10 minutes within 7 day(s). 7. Patient will utilize energy conservation techniques during functional activities with verbal cues within 7 day(s). OCCUPATIONAL THERAPY TREATMENT  Patient: Yancy Khan (55 y.o. female)  Date: 4/10/2017  Diagnosis: Femur fracture, right (HCC)  Right Femur Fracture  PATHOLOGIC FRACTURE RIGHT FEMUR <principal problem not specified>  Procedure(s) (LRB):  FEMUR INTRA MEDULLARY NAILING RETROGRADE SUPRACHONDYLAR- RIGHT FEMUR (Right) 4 Days Post-Op  Precautions: TTWB, Fall      ASSESSMENT:  Pt had just completed session with PT but was willing to participate. Pt and therapist agreed that slower rehab process would better suit her needs due to living alone, decreased endurance over time (recently/prior to admit) and WB status. Pt needed verbal cues for hand placement for sit to stand and stand to sit. Issued hip kit and educated pt on all components with good understanding. Donned  And doffed socks with AE with min assist.  Stood to wash ella areas maintaining TTWB taking one hand off of walker at a time with min assist for balance. Unable to reach anal areas in standing.   Seated pt threaded underwear over RLE first lifting toes up and then heel due to inability to lift RLE against gravity seated. Min assist to pull up underpants over hips in standing. Recommend SNF for rehab at discharge. Progression toward goals:  [X]       Improving appropriately and progressing toward goals  [ ]       Improving slowly and progressing toward goals  [ ]       Not making progress toward goals and plan of care will be adjusted       PLAN:  Patient continues to benefit from skilled intervention to address the above impairments. Continue treatment per established plan of care. Discharge Recommendations:  [facility]  Further Equipment Recommendations for Discharge:  Hip kit, shower chair, wheelchair with leg rests and cushion       SUBJECTIVE:   Patient stated I like these tools.       OBJECTIVE DATA SUMMARY:   Cognitive/Behavioral Status:  Neurologic State: Alert  Orientation Level: Oriented X4  Cognition: Appropriate decision making; Appropriate for age attention/concentration; Appropriate safety awareness (occasional cues during ADLs due to TTWB)  Perception: Appears intact  Perseveration: No perseveration noted  Safety/Judgement: Awareness of environment     Functional Mobility and Transfers for ADLs:     Transfers:  Sit to Stand: Contact guard assistance  Functional Transfers  Bathroom Mobility: Minimum assistance (with rolling walker)     Balance:  Sitting: Intact  Standing: Impaired  Standing - Static: Constant support;Good  Standing - Dynamic : Fair     ADL Intervention:        Grooming  Washing Face: Supervision/set-up (seated)  Washing Hands: Supervision/set-up (seated)  Brushing Teeth: Supervision/set-up (seated)  Brushing/Combing Hair: Supervision/set-up (seated)     Upper Body Bathing  Bathing Assistance: Supervision/set-up (seated)  Position Performed: Seated in chair     Lower Body Bathing  Perineal  : Contact guard assistance (in standing; max for anal areas in standing)  Lower Body :  (educated pt on using long handled sponge)     Upper Body [address]: Supervision/ set-up     Lower Body Dressing Assistance  Underpants: Minimum assistance (using reacher thread over RLE 1st; lifting toes then heel)  Socks: Minimum assistance (with sock aid to don and dressing stick to doff)     Toileting  Bladder Hygiene: Contact guard assistance (standing one hand off walker at at time)     Cognitive Retraining  Safety/Judgement: Awareness of environment        Pain:         4/10 right hip           Activity Tolerance:      Please refer to the flowsheet for vital signs taken during this treatment.   After treatment:   [X] Patient left in no apparent distress sitting up in chair  [ ] Patient left in no apparent distress in bed  [X] Call bell left within reach  [X] Nursing notified  [ ] Caregiver present  [ ] Bed alarm activated      COMMUNICATION/COLLABORATION:   The patients plan of care was discussed with: Physical Therapist, Registered Nurse, Physician and patient     Asia Toro OTR/L  Time Calculation: 34 mins

## 2017-04-10 NOTE — PROGRESS NOTES
Hospitalist Progress Note    NAME: Alexandra White   :  1960   MRN:  [id number]       Interim Hospital Summary: 61 y.o. female whom presented on 2017 with      Assessment / Plan:    Femur fracture, right ([address] (2017), likely pathologic POA  - unable to ambulate with acute severe pain after a 'popping sound\". No known trauma  -CT showed distal R femur fracture s/p R femur biopsy on  positive for malignancy, unclear if this is the same pathology as her lung ca. She underwent femur intramedullary nailing on .    -off pca, now on oral pain meds  -PT/OT/CM/rehab on discharge, awaiting pt's choice and acceptance     Fever  -likely post op  -cxr neg, BC, UA neg  -fever resolved    Recent pulmonary embolism 1 month ago POA diagnosed at [facility]  -continue [facility] cancer RLL POA s/p resection [facility]  -currently receiving adjuvant chemo after surgery at [address] with Dr Stephanie Wright  -no prior mets, body PET scan (no LE extremities) in 2016 was negative. Bone scan neg for mets  -Oncology following, appreciate recs     Elevated BP with diagnosis of HTN POA  -stable  -PRN labetalol     Anxiety/depression POA  -continue lexapro and wellbutrin      Hyperlipidemia POA  -continue statin     DVT prophylaxis: xarelto     Stress ulcer prophylaxis: Not indicated     Code status: Full code     Subjective:     Chief Complaint / Reason for Physician Visit  No acute complaints. Awaiting snf/rehab placement. Discussed with RN events overnight. Review of Systems:  Symptom Y/N Comments  Symptom Y/N Comments   Fever/Chills y   Chest Pain n    Poor Appetite n   Edema n    Cough n   Abdominal Pain n    Sputum n   Joint Pain y    SOB/LEE n   Pruritis/Rash     Nausea/vomit    Tolerating PT/OT     Diarrhea    Tolerating Diet     Constipation    Other       Could NOT obtain due to:      Objective:     VITALS:   Last 24hrs VS reviewed since prior progress note.  Most recent are:  Patient Vitals for the past 24 hrs:   Temp Pulse Resp BP SpO2   04/10/17 0844 98.5 °F (36.9 °C) 87 18 100/51 95 %   04/10/17 0006 98.3 °F (36.8 °C) 79 18 100/59 98 %   04/09/17 2024 97.9 °F (36.6 °C) 81 18 108/61 95 %   04/09/17 1455 - 93 - 139/75 90 %   04/09/17 1448 - (!) 109 - 105/70 -   04/09/17 1444 - (!) 105 - 115/72 95 %       Intake/Output Summary (Last 24 hours) at 04/10/17 0932  Last data filed at 04/09/17 1804   Gross per 24 hour   Intake                0 ml   Output              300 ml   Net             -300 ml        PHYSICAL EXAM:  General: WD, WN. Alert, cooperative, no acute distress    EENT:  EOMI. Anicteric sclerae. MMM  Resp:  CTA bilaterally, no wheezing or rales. No accessory muscle use  CV:  Regular  rhythm,  No edema  GI:  Soft, Non distended, Non tender.  +Bowel sounds  Neurologic:  Alert and oriented X 3, normal speech,   Psych:   Good insight. Not anxious nor agitated  Skin:  No rashes. No jaundice    Reviewed most current lab test results and cultures  YES  Reviewed most current radiology test results   YES  Review and summation of old records today    NO  Reviewed patient's current orders and MAR    YES  PMH/ reviewed - no change compared to H&P  ________________________________________________________________________  Care Plan discussed with:    Comments   Patient x    Family      RN x    Care Manager     Consultant                        Multidiciplinary team rounds were held today with , nursing, pharmacist and clinical coordinator. Patient's plan of care was discussed; medications were reviewed and discharge planning was addressed.      ________________________________________________________________________  Total NON critical care TIME:  35   Minutes    Total CRITICAL CARE TIME Spent:   Minutes non procedure based      Comments   >50% of visit spent in counseling and coordination of care ________________________________________________________________________  Ellie Lawler MD     Procedures: see electronic medical records for all procedures/Xrays and details which were not copied into this note but were reviewed prior to creation of Plan. LABS:  I reviewed today's most current labs and imaging studies. Pertinent labs include:  No results for input(s): WBC, HGB, HCT, PLT, HGBEXT, HCTEXT, PLTEXT, HGBEXT, HCTEXT, PLTEXT in the last 72 hours.   Recent Labs      04/09/17   0419  04/08/17   0353   NA  140  140   K  4.1  3.9   CL  104  104   CO2  26  25   GLU  84  88   BUN  16  19   CREA  0.88  1.01   CA  8.6  8.4*       Signed: Ellie Lawler MD

## 2017-04-10 NOTE — PROGRESS NOTES
Ortho/ NeuroSurgery NP Note    POD# 4  s/p FEMUR INTRA MEDULLARY NAILING RETROGRADE SUPRACHONDYLAR- RIGHT FEMUR     Pt OOB to the chair. No complaints. Pain ok currently. Yesterday was \"really bad\". Good effect with pain med. VSS Afebrile. Ferris removed. Patient is voiding in adequate amounts. Labs  Lab Results   Component Value Date/Time    HGB 9.4 04/07/2017 04:22 AM      Lab Results   Component Value Date/Time    INR 1.2 04/04/2017 01:24 AM        Dressing c.d.i, cryotherapy  Calves soft and supple; No pain with passive stretch  Sensation and motor intact  SCDs for mechanical DVT proph while in bed     PLAN:  1) PT BID  2) Xarelto for DVT Prophylaxis   3) Plan d/c SNF per hospitalist. Orthopedically stable. Possibly today.      Armen Hair, NP

## 2017-04-10 NOTE — PROGRESS NOTES
Problem: Mobility Impaired (Adult and Pediatric)  Goal: *Acute Goals and Plan of Care (Insert Text)  Physical Therapy Goals  Initiated 4/7/2017  1. Patient will move from supine to sit and sit to supine in bed with supervision/set-up within 7 day(s). 2. Patient will transfer from bed to chair and chair to bed with supervision/set-up using the least restrictive device within 7 day(s). 3. Patient will perform sit to stand with supervision/set-up within 7 day(s). 4. Patient will ambulate with minimal assistance/contact guard assist for 40 feet with the least restrictive device within 7 day(s). PHYSICAL THERAPY TREATMENT  Patient: Misty Prado (42 y.o. female)  Date: 4/10/2017  Diagnosis: Femur fracture, right (HCC)  Right Femur Fracture  PATHOLOGIC FRACTURE RIGHT FEMUR <principal problem not specified>  Procedure(s) (LRB):  FEMUR INTRA MEDULLARY NAILING RETROGRADE SUPRACHONDYLAR- RIGHT FEMUR (Right) 4 Days Post-Op  Precautions: TTWB, Fall      ASSESSMENT:  Patient making steady progress toward goals. Received supine in bed and is in good spirits. Supine to sit with Jocelynn using opposite leg to assist out of bed. Sit to stand with CGA and amb approx 60 feet with RW and CGA with step-to pattern and maintains TTWB well. Noted increased fatigue after ambulation. Recommend SNF level rehab at discharge to continue mobility progression. Progression toward goals:  [ ]    Improving appropriately and progressing toward goals  [X]    Improving slowly and progressing toward goals  [ ]    Not making progress toward goals and plan of care will be adjusted       PLAN:  Patient continues to benefit from skilled intervention to address the above impairments. Continue treatment per established plan of care. Discharge Recommendations:  Skilled Nursing Facility  Further Equipment Recommendations for Discharge:  TBD       SUBJECTIVE:   Patient stated I'm still very sleepy but I can try to do something.       OBJECTIVE DATA SUMMARY:   Critical Behavior:  Neurologic State: Alert  Orientation Level: Oriented X4  Cognition: Appropriate decision making, Appropriate for age attention/concentration, Appropriate safety awareness (occasional cues during ADLs due to TTWB)  Safety/Judgement: Awareness of environment  Functional Mobility Training:  Bed Mobility:     Supine to Sit: Minimum assistance (uses opposite leg to assist)     Scooting: Supervision        Transfers:  Sit to Stand: Contact guard assistance  Stand to Sit: Contact guard assistance                             Balance:  Sitting: Intact  Standing: Impaired  Standing - Static: Constant support;Good  Standing - Dynamic : Fair  Ambulation/Gait Training:  Distance (ft): 60 Feet (ft)  Assistive Device: Gait belt;Walker, rolling  Ambulation - Level of Assistance: Contact guard assistance        Gait Abnormalities: Antalgic;Decreased step clearance; Other (\"hop-to pattern')        Base of Support: Shift to left  Stance: Right decreased  Speed/Penny: Pace decreased (<100 feet/min); Slow  Step Length: Left shortened;Right shortened                               Pain:      no reports of pain during session              Activity Tolerance:   VSS  Please refer to the flowsheet for vital signs taken during this treatment.   After treatment:   [ ]    Patient left in no apparent distress sitting up in chair  [X]    Patient left in no apparent distress in bed  [X]    Call bell left within reach  [X]    Nursing notified  [ ]    Caregiver present  [ ]    Bed alarm activated      COMMUNICATION/COLLABORATION:   The patients plan of care was discussed with: Physical Therapist and Registered Nurse     Juancarlos Hinojosa PT, DPT   Time Calculation: 18 mins

## 2017-04-10 NOTE — PROGRESS NOTES
CM met pt and discussed discharge plan. Gave SNF list after PT recommended. Pt said she would like to use [facility] at [facility]. CM send referral through CC link. CM will  follow up with [facility] regarding pt discharge. 11.45 PM   [facility] accepted pt however she need authorization.          Sara Logan  Ext [phone]

## 2017-04-10 NOTE — PROGRESS NOTES
Bedside and Verbal shift change report given to devan tillman rn (oncoming nurse) by Meño Guerra (offgoing nurse). Report included the following information SBAR, Kardex, MAR, Accordion and Recent Results.

## 2017-04-11 NOTE — PROGRESS NOTES
Physical Therapy    Attempting to see patient for PT treatment session. She declined stating that she would be leaving for [facility] at 11:30 am and would like to get some of her am care done and get dressed. Will let nursing tech know.       Shelly Davis, PT  Time Calculation: 8 minutes

## 2017-04-11 NOTE — OP NOTES
Natalee Rausch MD - Adult Reconstruction and Total Joint Replacement    Dariana Wyatt - MRN [id number] - : 1960 (43 y.o.)      Date: 2017    Preoperative diagnosis: PATHOLOGIC FRACTURE RIGHT SHAFT/SUPRACONDYLAR FEMUR    Postoperative diagnosis: PATHOLOGIC FRACTURE RIGHT SHAFT/SUPRACONDYLAR FEMUR    Procedure performed:  Retrograde IM nail    Anesthesia: General    Surgeon(s) and Role:     * Natalee Rausch MD - Primary    Assistant: SASKIA Johnson      This note describes the use of intraoperative fluoroscopy. Fluoroscopic imaging was utilized in orthogonal planes as well as using live technique for all phases of the procedure, described separately in the operative report, including fracture reduction and hardware placement.     Natalee Rausch MD    CPT code: 62961

## 2017-04-11 NOTE — PROGRESS NOTES
Ortho / Neurosurgery NP Note    POD# 5  s/p FEMUR INTRA MEDULLARY NAILING RETROGRADE SUPRACHONDYLAR- RIGHT FEMUR     Pt resting in bed. No complaints. Denies SOB, CP, Dizziness    VSS Afebrile. Tmax 99.1  Voiding status: Voiding independently  + BM x 2  Tolerating PO well. Labs  Lab Results   Component Value Date/Time    HGB 7.6 04/11/2017 04:10 AM      Lab Results   Component Value Date/Time    INR 1.2 04/04/2017 01:24 AM        4 Opsite visible Dressing c.d.i, no obvious signs of bleeding. Cryotherapy in place over incision incision near knee  Mild, expected post-op edema  Calves soft and supple; No pain with passive stretch  Sensation and motor intact  SCDs for mechanical DVT proph while in bed     PLAN:  1) PT BID, TTWB  2) Expected Acute blood loss post-op anemia - asymptomatic. Expect drift after surgery and on Xarelto. 3) Xarelto 20 mg for DVT Prophylaxis (recent PE about 1 month ago)  3) Coached and observed patient in proper use of incentive spirometry. Encourage regular use to keep temp down and promote oxygenation  4) Plan d/c to [facility] when insurance auth obtained. [id number] for D/c from [facility].     Miracle Jeronimo, NP

## 2017-04-11 NOTE — PROGRESS NOTES
Spiritual Care Assessment/Progress Notes    Misty Prado [id number]  [id number]    1960  61 y.o.  female    Patient Telephone Number: [phone] (home)   Gnosticism Affiliation: No Alevism   Language: English   Extended Emergency Contact Information  Primary Emergency Contact: [address] Phone: [phone]  Relation: Daughter  Secondary Emergency Contact: Cordelia Varma  Home Phone: [phone]  Relation: Child   Patient Active Problem List    Diagnosis Date Noted    Femur fracture, right ([facility] 04/04/2017    Malignant neoplasm of right lung ([facility] 03/21/2017    S/P lobectomy of lung 11/23/2016    Hyperlipidemia 11/12/2014    Colon cancer screening 01/04/2013    Basal cell cancer 03/27/2012    Pap smear for cervical cancer screening 03/27/2012    History of screening mammography 03/27/2012    Depression     Stress incontinence         Date: 4/11/2017       Level of Gnosticism/Spiritual Activity:  []         Involved in helene tradition/spiritual practice    []         Not involved in helene tradition/spiritual practice  [x]         Spiritually oriented    []         Claims no spiritual orientation    []         seeking spiritual identity  []         Feels alienated from Hindu practice/tradition  []         Feels angry about Hindu practice/tradition  []         Spirituality/Hindu tradition a resource for coping at this time.   []         Not able to assess due to medical condition    Services Provided Today:  []         crisis intervention    []         reading Scriptures  [x]         spiritual assessment    []         prayer  [x]         empathic listening/emotional support  []         rites and rituals (cite in comments)  []         life review     []         Hindu support  []         theological development   []         advocacy  []         ethical dialog     []         blessing  []         bereavement support    [x]         support to family  [] anticipatory grief support   []         help with AMD  []         spiritual guidance    []         meditation      Spiritual Care Needs  []         Emotional Support  []         Spiritual/Sabianism Care  []         Loss/Adjustment  []         Advocacy/Referral                /Ethics  [x]         No needs expressed at               this time  []         Other: (note in               comments)  [address]  []         Follow up visits with               pt/family  []         Provide materials  []         Schedule sacraments  []         Contact Community               Clergy  [x]         Follow up as needed  []         Other: (note in               comments)     Comments:  initiated visit due to pt's length of stay. Pt's daughter was present. Pt was dressed and ready to be D/Damion to another facility for rehab. Pt shared that it isn't something she wants to do but she knows that rehab will be good for her. Pt seemed optimistic of the near future. Advised of  availability. Brandy Sorensen M.S.   Spiritual Care Department  If need arise please call VIC ([phone])

## 2017-04-11 NOTE — PROGRESS NOTES
Hematology Oncology Progress Note    Follow up for: Lung cancer metastatic to bone    Chart notes reviewed since last visit. Case discussed with following: Dr. Chester Rosales in pathology dept. Patient complains of the following:  Doing better. anticipates transfer to [address] [place] [address] at 11:30 AM today. Additional concerns noted by the staff:     Patient Vitals for the past 24 hrs:   BP Temp Pulse Resp SpO2   04/11/17 0911 101/65 98.2 °F (36.8 °C) 76 16 97 %   04/10/17 2012 105/53 99.1 °F (37.3 °C) 96 18 93 %   04/10/17 1549 101/57 98.3 °F (36.8 °C) 88 16 91 %   04/10/17 1058 100/65 98.2 °F (36.8 °C) 88 18 97 %       Review of Systems:  12 point ROS done and negative except as above    Physical Examination:  General NAD  HEENT: NC, AT, pupils round, reactive  Nck - supple. No nodes noted. CV reg  Lungs clear  abd soft,nontender, NABS, - HSM appreciated  R leg swollen appropriately; incisions clean and  look good   extr - warm  Skin dry  Labs:  Recent Results (from the past 24 hour(s))   CBC W/O DIFF    Collection Time: 04/11/17  4:10 AM   Result Value Ref Range    WBC 5.8 3.6 - 11.0 K/uL    RBC 2.38 (L) 3.80 - 5.20 M/uL    HGB 7.6 (L) 11.5 - 16.0 g/dL    HCT 23.2 (L) 35.0 - 47.0 %    MCV 97.5 80.0 - 99.0 FL    MCH 31.9 26.0 - 34.0 PG    MCHC 32.8 30.0 - 36.5 g/dL    RDW 17.0 (H) 11.5 - 14.5 %    PLATELET 255 292 - 626 K/uL       Imaging:  Bone scan  IMPRESSION: Uptake in the right femur where there is a known fracture. Uptake  right lateral rib, likely fracture. No evidence of metastatic disease. Path from femur fx   Right femur, bone biopsy:   Malignant epithelioid and spindle cell neoplasm, consistent with metastatic sarcomatoid carcinoma, see comment     Assessment and Plan:   Lung Ca  -has been on postop adjuvant chemo with Dr Medardo Traore but current pathologic finding constitutes progressive disease.   Have contacted Dr. Medardo Traore to verify whether boni asked for path to be sent for mutation testing to see if there is a  mutation that can be exploited. Femur fx  -bx positive for malignancy. Pathologist has asked to compare it to path from lung resection done at [place]    PE  -was put back on Xarelto and appears to be tolerating. No obvious bleeding.      Profound anemia - likely related to recent chemo and surgery

## 2017-04-11 NOTE — PROGRESS NOTES
Pt accepted to [facility]. Transport has been arranged with a  time of 11.30 AM by [facility]. Floor nurse can call report to [facility] SNF wing 2 at [phone] room #202 , please send most recent MAR, copy of d/c instructions, SNF hand off report, and any prescriptions that need to go with the pt. Provider info updated to pt AVS.    Gisela Motley  Ext [phone]

## 2017-04-11 NOTE — DISCHARGE SUMMARY
Discharge Summary      Name: Charity Coyne  [id number]  YOB: 1960 (Age: 61 y.o.)   Date of Admission: 4/4/2017  Date of Discharge: 4/11/2017  Attending Physician: Fiona Oden MD    Discharge Diagnosis:   Femur fracture, right ([address] (4/4/2017), likely pathologic POA  Hx of PE  Elevated BP with diagnosis of HTN POA  Depression  RLL lung CA     Anxiety/depression POAConsultants: Hematology/Oncology and Orthopedic Surgery    Procedures:   Femur intramedullary nailing on 4/6. Brief Admission History/Reason for Admission Per Binh Mahoney MD:   61 y.o.  female:  Diagnosed with RLL lung cancer 11/2016, s/p resection at [facility]  Followed by Dr Zaynab Smyth at [facility], started chemo and is set to receive the 3rd cycle  Admitted ~ 1 month ago with pulmonary embolism at [facility], started on xarelto  1 month ago developed a aching sensation in upper thigh, worse with movement  Received courses of medrol and prednisone without benefit  Had X-rays as an outpatient, only told she had OA  Pain gradually worsened over time  4/3/2017 got up up, pain was worse, took some tylenol  Later in day seard several \"Popping sounds\", then developed severe pain in the right thigh  Not able to walk, pain worse with any movement. Called EMS, brought to the ED   56 y.o.   female:  Diagnosed with RLL lung cancer 11/2016, s/p resection at [facility]  Followed by Dr Zaynab Smyth at [facility], started chemo and is set to receive the 3rd cycle  Admitted ~ 1 month ago with pulmonary embolism at [facility], started on xarelto  1 month ago developed a aching sensation in upper thigh, worse with movement  Received courses of medrol and prednisone without benefit  Had X-rays as an outpatient, only told she had OA  Pain gradually worsened over time  4/3/2017 got up up, pain was worse, took some tylenol  Later in day seard several \"Popping sounds\", then developed severe pain in the right thigh  Not able to walk, pain worse with any movement. Called EMS, brought to the ED     [address] Problems:   Femur fracture, right ([address] (4/4/2017), pathologic, POA  Pt unable to ambulate with acute severe pain after a 'popping sound\". No known trauma. CT showed distal R femur fracture s/p R femur biopsy on 4/5 positive for malignancy, unclear if this is the same pathology as her lung ca. She underwent femur intramedullary nailing on 4/6. She required PCA pump for pain controlled, now transitioned to PO pain meds. Patient will need PT/OT BID, TTWB. F/u with Dr Walters Section in 2 weeks.     Fever, resolved. Likely post op  CXR neg, BC, UA neg.     Recent pulmonary embolism 1 month ago POA diagnosed at [address] [place] [address]  -[facility]     Lung cancer RLL POA s/p resection [facility] 11/2016  -currently receiving adjuvant chemo after surgery at [address] with [address] Jun Silva. She will need to f/u outpt with Dr Jun Silva. No hx of prior mets, body PET scan (no LE extremities) in 12/2016 was negative. Bone scan neg for mets.      Elevated BP with diagnosis of HTN POA, resolved      Anxiety/depression POA  -continue lexapro and wellbutrin      Hyperlipidemia POA  -continue statin    Discharge Exam:  Patient seen and examined by me on discharge day. Pertinent Findings:  Visit Vitals    /65    Pulse 76    Temp 98.2 °F (36.8 °C)    Resp 16    Ht 5' 2\" (1.575 m)    Wt 72 kg (158 lb 11.7 oz)    SpO2 97%    Breastfeeding No    BMI 29.03 kg/m2     Gen:    Not in distress  Chest: Clear lungs  CVS:   Regular rhythm.   No edema  Abd:  Soft, not distended, not tender    Discharge/Recent Laboratory Results:  Recent Labs      04/09/17   0419   NA  140   K  4.1   CL  104   CO2  26   BUN  16   GLU  84   CA  8.6     Recent Labs      04/11/17   0410   HGB  7.6*   HCT  23.2*   WBC  5.8   PLT  165       Discharge Medications:  Current Discharge Medication List      START taking these medications    Details   bisacodyl (DULCOLAX) 10 mg suppository Insert 10 mg into rectum daily. Qty: 1 Each, Refills: 0      famotidine (PEPCID) 20 mg tablet Take 1 Tab by mouth two (2) times a day. Qty: 30 Tab, Refills: 0      HYDROcodone-acetaminophen (NORCO)  mg tablet Take 1 Tab by mouth every four (4) hours as needed. Max Daily Amount: 6 Tabs. Qty: 15 Tab, Refills: 0      polyethylene glycol (MIRALAX) 17 gram packet Take 1 Packet by mouth daily. Qty: 1 Each, Refills: 0         CONTINUE these medications which have CHANGED    Details   cyclobenzaprine (FLEXERIL) 10 mg tablet Take 1 Tab by mouth nightly. Qty: 10 Tab, Refills: 0         CONTINUE these medications which have NOT CHANGED    Details   rivaroxaban (XARELTO) 15 mg (42)- 20 mg (9) DsPk Take  by mouth. Take with food, at approximately the same time each day. Cetirizine (ZYRTEC) 10 mg cap Take  by mouth.      escitalopram oxalate (LEXAPRO) 10 mg tablet Take 10 mg by mouth daily. Refills: 0      simvastatin (ZOCOR) 40 mg tablet TAKE 1 TAB BY MOUTH NIGHTLY. Qty: 90 Tab, Refills: 1    Comments: NEED REFILSL      buPROPion XL (WELLBUTRIN XL) 300 mg XL tablet Take 1 Tab by mouth every morning. Qty: 90 Tab, Refills: 0      solifenacin (VESICARE) 5 mg tablet Take 5 mg by mouth daily. STOP taking these medications       predniSONE (DELTASONE) 10 mg tablet Comments:   Reason for Stopping:               DISPOSITION:    Home with Family:    Home with HH/PT/OT/RN:    SNF/LTC: x   ANA:    OTHER:          Follow up with:   PCP : Jelena Mcpherson MD  Follow-up Information     Follow up With Details Comments Contact Info    Emmie Bhakta MD In 2 weeks  [address]  [place] [place] [address]      Sanju Nieto MD In 1 week  [address] [facility] [place] [address]  [phone]      [address] [place] [address] On 4/10/2017 This is your skilled nursing provider.  [address] [facility] [address] [address]  [phone]      Kathleene Lanes, [address] [facility]  [place]   [phone]            Diet: regular    Total time in minutes spent coordinating this discharge (includes going over instructions, follow-up, prescriptions, and preparing report for sign off to her PCP) :  35 minutes

## 2017-04-11 NOTE — PROGRESS NOTES
[place] [facility]. Discharge paperwork, medications and side effects reviewed with patient and daughter. Prescriptions given to EMS. IV discontinued without complication. Honeycomb dressings given to patient after wound protocol explained. Report called to Dolores Parmar at [place]. Patient discharged with EMS followed by daughter.

## 2017-04-11 NOTE — DISCHARGE INSTRUCTIONS
[address]    Discharge Instruction Sheet: Hip Fracture Repair    Dr. Adilia Borrego will continue to take your Xarelto as a blood thing to treat the blood clot in your lungs from a             month ago. Follow the instructions per the doctor who ordered that. Pain control:  Medications   Typically, we will prescribe a narcotic usually 1-2 tabs every three to four hours as needed for your pain. Most patients need this only for the first few weeks.  You should discontinue this as the pain decreases.  Some of these medications contain a large dose of Tylenol (acetaminophen). Therefore, you should consult your pharmacist before taking any additional Tylenol at the same time. You should not drive while taking any narcotic pain medications. Take your pain medications with food to prevent nausea! Your last dose of pain medication in the hospital was given @ :__________    [address] will be discharged home with ice/gel packs.  Continue using these regularly to minimize pain and swelling, especially after physical activity. Constipation   Pain medication and anesthesia can be constipating-this can be prevented by gentle physical activity and drinking plenty of fluid.  It should be treated with over-the-counter medications such as Dulcolax, Miralax, Magnesium Citrate and/or Fleets enema.  You should have a bowel movement at least every other day following surgery. Incision care   You will be discharged with a transparent and waterproof dressing over your incision. o This should remain in place for 5-7 days after discharge.   You will be given one additional dressing to use at home in 5-7 days if you are still having drainage   You may shower with this dressing in place after you are discharged.    o After showering pat the dressing/incision dry.  When the incision is no longer draining, it may be left open to the air.  DO NOT rub the incision.  DO NOT take a tub bath or go swimming until cleared by your doctor.  DO NOT apply lotions, oils, or creams to incision. To increase and promote healing:   Stop Smoking (or at least cut back on smoking).  Eat a well-balanced diet (high in protein and vitamin C)   If your appetite is poor, consider nutritional supplements like Ensure, Glucerna, or Crewe Instant Breakfast.   If you are diabetic, controlling you blood sugars is very important to prevent infection and promote wound healing. Nutrition:   If you were on a supplement such as Ensure or Glucerna) while in the hospital, please continue using them with each meal for the next 30 days.  Eat a well-balanced diet - High in protein, high in vitamins and minerals, especially vitamin C and zinc.     Home Health:   If you have staples a home health nurse will remove them in about 10 days.  Physical Therapy will come to your home to continue training for walking, transferring and exercises    Physical Activity     You can toe touch weight bear on your right leg (TTWB).  Bed-rest may slow your recovery.  Use your walker and take short walks about every 2 hours.  Increase your distance each day.  NO DRIVING until told to do so. Warning signs: Please call your physician immediately at [phone] if you have   Bleeding from incision that is constant.  Change in mental status (unusual behavior or confusion)   If your incision develops redness or swelling   Change in wound drainage (increase in amount, color, or foul odor)   Temperature over 101.5 degrees Fahrenheit    Pain in the calf of your leg   Tenderness or redness in the calf of your leg   Increased swelling of the thigh, ankle, calf, or foot.     Emergency: CALL 911 if you have   Shortness of breath   Chest pain   Localized chest pain when coughing or taking a deep breath    Follow-up    Please call your surgeons office at [phone] for a follow up appointment.   o You should call as soon as you get home or the next day after discharge. o Ask to make an appointment in 2 weeks.  You can return to work when cleared by a physician. During normal business hours you may reach Dr. Terrance Casey' team directly at [phone] if you have concerns or questions.

## 2017-04-11 NOTE — PROGRESS NOTES
Bedside shift change report given to [address] (oncoming nurse) by Nils Marshall RN (offgoing nurse). Report included the following information SBAR, Kardex, Intake/Output, MAR, Accordion and Recent Results.

## 2017-04-12 NOTE — OP NOTES
[address], [address]   OP NOTE       Name:  Valerio Lynn   MR#:  [id number]   :  1960   Account #:  [de-identified]    Surgery Date:  2017   Date of Adm:  2017       PREOPERATIVE DIAGNOSIS: Right femur pathologic fracture. POSTOPERATIVE DIAGNOSIS: Right femur pathologic fracture. PROCEDURES PERFORMED: Retrograde intramedullary nail fixation. ANESTHESIA: General.    SURGEON: Dianne Sutton MD    ASSISTANT: Frannie Quiles PA-C    ESTIMATED BLOOD LOSS: Minimal.    DRAINS: None. SPECIMENS REMOVED: None. COMPLICATIONS: None. CONDITION: Stable to PACU. INDICATIONS: A 70-year-old female who suffered a pathologic   fracture from metastatic lung cancer. Appropriate biopsy was obtained   the day before her procedure to confirm that this was indeed   a metastatic lesion. Prior consultation with an orthopedic oncologist at   [facility] confirmed our treatment plan. She understood no guarantees   could be given about the outcome and wished to proceed. DESCRIPTION OF PROCEDURE: After being identified in the   preoperative holding area and having her operative site marked, the   patient was brought back to the operating room, placed supine on a   standard OR table. General anesthesia was induced without difficulty. Preoperative antibiotics were administered. The right lower extremity   was then prepped and draped in the usual fashion using sterile   technique. An appropriate time-out was performed. A sterile tourniquet   was applied to the right thigh. The limb was exsanguinated and the   tourniquet inflated. We began by marking the trajectory of the femoral   canal on AP and lateral views using fluoroscopy.  We then made a 1.5   cm incision directly below the inferior pole of the patella in line with the   intramedullary canal. The curved cannulated awl was used to gain   access to the distal femoral canal. The guidewire was then passed in a   retrograde fashion to the level of the lesser trochanter. The indirect   measuring guide was used to determine nail length. We then   sequentially reamed to 11.5 mm to allow insertion of a 10 x 320 mm   retrograde femoral nail. The nail was assembled and inserted over the   guidewire to the appropriate depth. We then used the targeting arm to   place 2 locking screws as well as a helical blade and end cap was   used to lock the helical blade and we confirmed our hardware   placement and fracture reduction on orthogonal views. We back   slapped the femur to compact the fracture site and had excellent bony   apposition. We then used perfect circles technique proximally for an   anterior to posterior screw. Wounds were irrigated and closed in   routine fashion. Sterile compressive dressings were applied. The   patient was awakened, moved to stretcher, and taken to the recovery   room in stable condition. At the conclusion of the procedure, all counts   were correct. There were no immediate complications.         Blade Aguilar MD      [facility] / [facility]   D:  04/11/2017   17:03   T:  04/11/2017   20:50   Job #:  [id number]

## 2017-04-12 NOTE — PROGRESS NOTES
Patient on the [place] report dated 4/12/17. Admitted to [address] 4/4/17 - 4/11/17 for right femoral fx. PMH: RLL neoplasm of lung, basal cell cancer, depression, hyperlipidemia    Brief Admission History/Reason for Admission Per Binh Mahoney MD:   61 y.o.  female:  Diagnosed with RLL lung cancer 11/2016, s/p resection at [facility]  Followed by Dr Zaynab Smyth at [facility], started chemo and is set to receive the 3rd cycle  Admitted ~ 1 month ago with pulmonary embolism at [facility], started on xarelto  1 month ago developed a aching sensation in upper thigh, worse with movement  Received courses of medrol and prednisone without benefit  Had X-rays as an outpatient, only told she had OA  Pain gradually worsened over time  4/3/2017 got up up, pain was worse, took some tylenol  Later in day seard several \"Popping sounds\", then developed severe pain in the right thigh  Not able to walk, pain worse with any movement. Called EMS, brought to the ED   56 y.o.  female:  Diagnosed with RLL lung cancer 11/2016, s/p resection at [facility]  Followed by Dr Zaynab Smyth at [facility], started chemo and is set to receive the 3rd cycle  Admitted ~ 1 month ago with pulmonary embolism at [facility], started on xarelto  1 month ago developed a aching sensation in upper thigh, worse with movement  Received courses of medrol and prednisone without benefit  Had X-rays as an outpatient, only told she had OA  Pain gradually worsened over time  4/3/2017 got up up, pain was worse, took some tylenol  Later in day seard several \"Popping sounds\", then developed severe pain in the right thigh  Not able to walk, pain worse with any movement    Hospital course:  Femur fracture, right ([address] (4/4/2017), pathologic, POA  Pt unable to ambulate with acute severe pain after a 'popping sound\". No known trauma. CT showed distal R femur fracture s/p R femur biopsy on 4/5 positive for malignancy  Fever, resolved.  Likely post op  CXR neg, BC, UA neg. Recent pulmonary embolism 1 month ago POA diagnosed at [address]  cancer RLL POA s/p resection at [facility] doctors 11/2016  -currently receiving adjuvant chemo after surgery at [address] with Dr Marcela Potter. She will need to f/u outpt with Dr Marcela Potter. No hx of prior mets, body PET scan (no LE extremities) in 12/2016 was negative. Bone scan neg for mets. Elevated BP with diagnosis of HTN POA, resolved  Anxiety/depression POA  -continue lexapro and wellbutrin  Hyperlipidemia POA  -continue statin    Discharged to [facility] for rehabilitation. Called BEENA gNuyen at [facility] and requested notification of discharge planning once date has been determined.

## 2017-04-24 PROBLEM — E83.52 HYPERCALCEMIA: Status: ACTIVE | Noted: 2017-01-01

## 2017-04-24 PROBLEM — R41.82 ALTERED MENTAL STATUS: Status: ACTIVE | Noted: 2017-01-01

## 2017-04-24 NOTE — PROGRESS NOTES
Primary Nurse Arcola Snellen, RN and Dell Melton RN performed a dual skin assessment on this patient No impairment noted  Yg score is 21

## 2017-04-24 NOTE — PROGRESS NOTES
Transfer Medication Reconciliation:    Information obtained from: [facility]    Significant PMH/Disease States:   Past Medical History:   Diagnosis Date    Cancer [facility])     [address], lung cancer    Depression     History of screening mammography 3/27/2012    Ill-defined condition     fx femur    Intention tremor     Pap smear for cervical cancer screening 3/27/2012    Stress incontinence     Tobacco abuse        Chief Complaint for this Admission:  dizziness    Allergies:  Tetracycline and Lortab [hydrocodone-acetaminophen]    Prior to Admission Medications:   Prior to Admission Medications   Prescriptions Last Dose Informant Patient Reported? Taking? HYDROcodone-acetaminophen (NORCO)  mg tablet   No Yes   Sig: Take 1 Tab by mouth every four (4) hours as needed. Max Daily Amount: 6 Tabs. bisacodyl (DULCOLAX) 10 mg suppository   Yes Yes   Sig: Insert 10 mg into rectum daily as needed for Other (constipation). buPROPion XL (WELLBUTRIN XL) 300 mg XL tablet   No Yes   Sig: Take 1 Tab by mouth every morning. cetirizine (ZYRTEC) 10 mg tablet   Yes Yes   Sig: Take 10 mg by mouth daily. cyclobenzaprine (FLEXERIL) 10 mg tablet   No Yes   Sig: Take 1 Tab by mouth nightly. escitalopram oxalate (LEXAPRO) 10 mg tablet   Yes Yes   Sig: Take 10 mg by mouth daily. famotidine (PEPCID) 20 mg tablet   No Yes   Sig: Take 1 Tab by mouth two (2) times a day. polyethylene glycol (MIRALAX) 17 gram packet   No Yes   Sig: Take 1 Packet by mouth daily. rivaroxaban (XARELTO) 15 mg tab tablet   Yes Yes   Sig: Take 15 mg by mouth daily (with breakfast). simvastatin (ZOCOR) 40 mg tablet   No Yes   Sig: TAKE 1 TAB BY MOUTH NIGHTLY.   solifenacin (VESICARE) 5 mg tablet   Yes Yes   Sig: Take 5 mg by mouth daily. Facility-Administered Medications: None         Comments/Recommendations: Medication list updated with the following changes:  1.  Changed bisacodyl to PRN  2. Updated doses for rivaroxaban and coco Means, Pharm. D., BCPS

## 2017-04-24 NOTE — PROGRESS NOTES
TRANSFER - IN REPORT:    Verbal report received from Ethan Chaudhry RN (name) on [employer]  being received from ED (unit) for routine progression of care      Report consisted of patients Situation, Background, Assessment and   Recommendations(SBAR). Information from the following report(s) SBAR, Kardex, ED Summary, Intake/Output, Recent Results and Cardiac Rhythm NSR was reviewed with the receiving nurse. Opportunity for questions and clarification was provided. Assessment completed upon patients arrival to unit and care assumed.

## 2017-04-24 NOTE — ED PROVIDER NOTES
Patient is a 61 y.o. female presenting with abnormal lab results and altered mental status. Abnormal Lab Results     Altered mental status       61year old female with history of lung cancer, recent pathologic fracture repair of right femur(4/11, [facility]) presents from rehab facility with report of elevated calcium. Patient isn't sure why she is here. She feels like she has been having a hard time concentrating, hard time getting right words out since yesterday morning. Denies headahce. Denies fall. Denies focal weakness or numbness. States seeing double yesterday, not now. Rehab facility states pneumonia on xray and high calcium on labs. No fevers. Past Medical History:   Diagnosis Date    Cancer ([facility] [address]     BCC, lung cancer    Depression     History of screening mammography 3/27/2012    Ill-defined condition     fx femur    Intention tremor     Pap smear for cervical cancer screening 3/27/2012    Stress incontinence     Tobacco abuse        Past Surgical History:   Procedure Laterality Date    ENDOSCOPY, COLON, DIAGNOSTIC  2006    HX BREAST BIOPSY Left 09/23/2014    HX ORTHOPAEDIC Right 04/05/2017    biopsy of lesion    [place] COLSC FLX W/REMOVAL LESION BY HOT BX FORCEPS  1/4/2013              Family History:   Problem Relation Age of Onset    Ovarian Cancer Mother     Elevated Lipids Mother     Clotting Disorder Mother     Cancer Mother      ovarian    Colon Cancer Father 48    Cancer Father 48     colon       Social History     Social History    Marital status: SINGLE     Spouse name: N/A    Number of children: N/A    Years of education: N/A     Occupational History    Not on file.      Social History Main Topics    Smoking status: Former Smoker     Packs/day: 1.00     Years: 31.00     Quit date: 12/1/2009    Smokeless tobacco: Never Used    Alcohol use No    Drug use: No    Sexual activity: Not on file     Other Topics Concern    Not on file     Social History Narrative ALLERGIES: Tetracycline and Lortab [hydrocodone-acetaminophen]    Review of Systems   Unable to perform ROS: Mental status change       Vitals:    04/24/17 0353   Weight: 73.6 kg (162 lb 4.1 oz)   Height: 5' 3\" (1.6 m)            Physical Exam   Constitutional: She is oriented to person, place, and time. She appears well-developed and well-nourished. No distress. HENT:   Head: Normocephalic and atraumatic. Mouth/Throat: Oropharynx is clear and moist. No oropharyngeal exudate. Eyes: Conjunctivae and EOM are normal. Pupils are equal, round, and reactive to light. Right eye exhibits no discharge. Left eye exhibits no discharge. No scleral icterus. Neck: Normal range of motion. Neck supple. No JVD present. Cardiovascular: Normal rate, regular rhythm, normal heart sounds and intact distal pulses. Exam reveals no gallop and no friction rub. No murmur heard. Pulmonary/Chest: Effort normal and breath sounds normal. No stridor. No respiratory distress. She has no wheezes. She has no rales. She exhibits no tenderness. Abdominal: Soft. Bowel sounds are normal. She exhibits no distension and no mass. There is no tenderness. There is no rebound and no guarding. Musculoskeletal: Normal range of motion. She exhibits no edema or tenderness. Incisional dressing c/d/i right thight   Neurological: She is alert and oriented to person, place, and time. She has normal reflexes. No cranial nerve deficit. She exhibits normal muscle tone. Coordination normal.   Skin: Skin is warm and dry. No rash noted. No erythema. Psychiatric: She has a normal mood and affect. Her behavior is normal. Judgment and thought content normal.        MDM  Number of Diagnoses or Management Options  Critical Care  Total time providing critical care: 30-74 minutes  30 minutes  ED Course       Procedures    ED EKG interpretation:  Rhythm: normal sinus rhythm; and regular .  Rate (approx.): 95; Axis: left axis deviation; P wave: normal; QRS interval: normal ; ST/T wave: non-specific changes;. This EKG was interpreted by Los Pelaez MD,ED Provider. acute  hypercalcemia with altered mental status. Fluids given. Spoke with oncology, agrees with Zoledronic acid, would hold off on calcitonin. Spoke with [facility] who will admit.

## 2017-04-24 NOTE — ED NOTES
During comprehensive triage, it was noticed that patient was aphasic and appeared to be thinking one thing and said another. Upon questioning if she felt that way, she nodded. This RN spoke with an employee of [facility] to decipher a last known well. The \"nurse\" stated that she had not seen the patient \"normal\" since she left her shift at [facility] 4/23/2017. She saw her in \"passing\" at [id number] but \"didn't speak\". At 0030 this morning, the patient was confused and said \"odd things\" which was \"unlike her\". She then said she couldn't answer for the shift prior to her on whether she was normal or not. No last known well for patient and she is unable to say.

## 2017-04-24 NOTE — PROGRESS NOTES
Chief Complaint - follow-up and management of  Lung cancer   Exam  Gen Moderate distress; confused; HEENT No mucositis; no thrush   Nodes No supraclavicular or cervical adenopathy   Chest / line sites No inflammation   Lungs Clear to auscultation   CV RRR   Abd Non-tender   Ext No edema on L; R leg swollen due to surgery   Skin No rashes   Neuro Awake and alert   Other    Time-based care: [] 25-35 min    [] > 35 mi     (Total time with >50% spent counseling/coordination)  Discussed with the following:  []     []  Nursing Staff  [] Consultant    []  Family   []  Other:  Active Medical Problems  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 During her second cycle she developed pathologic fracture of R femur, requiring IM nail. Went to rehab and this AM was confused. Calcium was 15. Zometa in ER. I agree with plans to control Ca. Perhaps back to rehab in a few days. She had bone scan 4/4 showing R femur lesion and R chest wall - possibly post-op. There was some uptake there on PET in December. [place] grim, but we need to get going on some systemic therapy. Will look into immune therapy for her. Current medications, progress notes, vital signs, radiology, and labs reviewed.

## 2017-04-24 NOTE — CDMP QUERY
Patient is noted to have a BMI of  31.13. Please clarify if this patient is: The [address] [place] [address] has issued a statement indicating that, \"Individuals who are overweight, obese, or morbidly obese are at an increased risk for certain medical conditions when compared to persons of normal weight. Therefore, these conditions are always clinically significant and reportable when documented by the provider. \"    =>Obesity (BMI of 30-39.9)  => Morbid Obesity  (BMI 40 or greater)  =>Overweight (BMI 25-29.9)  => Other weight status (specify  status)  => Unable to determine        Presentation:Ht 5' 3\" Wt 175lbs BMI 31.13          Please clarify and document your clinical opinion in the progress notes and discharge summary, including the definitive and or presumptive diagnosis, (suspected or probable), related to the above clinical findings. Please include clinical findings supporting your diagnosis.    Thank Gianluca Brar RN  [address]     [phone]

## 2017-04-24 NOTE — ED NOTES
Pt repositioned for comfort. V/S stable, no distress noted. Will continue to assess and monitor closely. Pt assisted to bedside commode and back to bed. Call bell in reach.

## 2017-04-24 NOTE — H&P
History & Physical    Date of admission: 4/24/2017    Patient name: Honey Quiros  MRN: [id number]  YOB: 1960  Age: 61 y.o. Primary care provider:  Andry Arias MD     Source of Information: patient, ED/ and medical records                                   Chief complaint:   dizziness    History of present illness  Honey Quiros is a 61 y.o. white female with past medical history of lung cancer, s/p repair of pathologic femur fracture, depression, intention tremor, stress incontinence presented with reported altered mental status and chief complaint of dizziness. Patient is a limited historian. Majority of history was obtained per review of ED/ and medical reports. Per collective reports, patient has known lung cancer of RLL diagnosed in 11/2016 and is s/p lung resection at [facility], s/p chemotherapy, and followed by oncologist Dr. Leeann Toro at [facility]. Patient had recent PE and was on Xarelto. Most recently patient was hospitalized from  4/4/2017 - 4/11/2017 with pathologic fracture of right femur and underwent IM nail of right femur fracture on 4/6/2017. Tonight, patient had onset of AMS having difficulty with speech. Patient complains of dizziness. Symptoms remained constant without specific known aggravating or alleviating factors, moderate to severe. There were no reports of new onset syncope, loss of consciousness, slurred speech, facial droop, headache, neck pain, visual disturbance, numbness, paresthesias, focal weakness, chest pain, shortness of breath, palpitations, abdominal pain, nausea, vomiting, diarrhea, calf pain, increased leg swelling/ edema, fever, chills. Patient came to the ED for further evaluation.     Past Medical History:   Diagnosis Date    Cancer ([address]     [address], lung cancer    Depression     History of screening mammography 3/27/2012    Ill-defined condition     fx femur    Intention tremor     Pap smear for cervical cancer screening 3/27/2012    Stress incontinence     Tobacco abuse       Past Surgical History:   Procedure Laterality Date    ENDOSCOPY, COLON, DIAGNOSTIC  2006    HX BREAST BIOPSY Left 09/23/2014    HX ORTHOPAEDIC Right 04/05/2017    biopsy of lesion    [place] COLSC FLX W/REMOVAL LESION BY HOT BX FORCEPS  1/4/2013          Prior to Admission medications    Medication Sig Start Date End Date Taking? Authorizing Provider   bisacodyl (DULCOLAX) 10 mg suppository Insert 10 mg into rectum daily. 4/10/17   Clem Vu MD   cyclobenzaprine (FLEXERIL) 10 mg tablet Take 1 Tab by mouth nightly. 4/10/17   Clem Vu MD   famotidine (PEPCID) 20 mg tablet Take 1 Tab by mouth two (2) times a day. 4/10/17   Clem Vu MD   HYDROcodone-acetaminophen (NORCO)  mg tablet Take 1 Tab by mouth every four (4) hours as needed. Max Daily Amount: 6 Tabs. 4/10/17   Clem Vu MD   polyethylene glycol (MIRALAX) 17 gram packet Take 1 Packet by mouth daily. 4/10/17   Clem Vu MD   rivaroxaban (XARELTO) 15 mg (42)- 20 mg (9) DsPk Take  by mouth. Take with food, at approximately the same time each day. Historical Provider   Cetirizine (ZYRTEC) 10 mg cap Take  by mouth. Historical Provider   escitalopram oxalate (LEXAPRO) 10 mg tablet Take 10 mg by mouth daily. 9/28/16   Historical Provider   simvastatin (ZOCOR) 40 mg tablet TAKE 1 TAB BY MOUTH NIGHTLY. 9/22/16   Ariel Cordero MD   buPROPion XL (WELLBUTRIN XL) 300 mg XL tablet Take 1 Tab by mouth every morning. 11/18/13   Ariel Cordero MD   solifenacin (VESICARE) 5 mg tablet Take 5 mg by mouth daily.     Historical Provider     Allergies   Allergen Reactions    Tetracycline Hives     Palms and soles    Lortab [Hydrocodone-Acetaminophen] Itching      Family History   Problem Relation Age of Onset    Ovarian Cancer Mother     Elevated Lipids Mother     Clotting Disorder Mother     Cancer Mother      ovarian    Colon Cancer Father 48    Cancer Father 48     colon         Social history  Patient resides  x  Independently               Alcohol history   x  None           Smoking history      x  Former smoker         History   Smoking Status    Former Smoker    Packs/day: 1.00    Years: 31.00    Quit date: 12/1/2009   Smokeless Tobacco    Never Used       Code status  x  Full code     Revixew of systems  I performed an 11 systems review; pertinent positives were as noted in HPI, otherwise negative. Physical Examination   Visit Vitals    /69    Pulse 96    Temp 98.1 °F (36.7 °C)    Resp 20    Ht 5' 3\" (1.6 m)    Wt 73.6 kg (162 lb 4.1 oz)    SpO2 96%    BMI 28.74 kg/m2          O2 Device: Room air    General:  Obese female in no acute respiratory distress   Head:  Normocephalic, without obvious abnormality, atraumatic   Eyes:  Conjunctivae/corneas clear. PERRL, EOMs intact   E/N/M/T: Nares normal. Septum midline. No nasal drainage or sinus tenderness  Tongue midline / non-edema  Clear oropharynx   Neck: Normal appearance and movements, symmetrical, trachea midline  No palpable adenopathy  No thyroid enlargement, tenderness or nodules  No carotid bruit   Normal JVD   Lungs:   Symmetrical chest expansion and respiratory effort  Clear to auscultation bilaterally   Chest wall:  No tenderness or deformity   Heart:  Regular rate and rhythm   Sounds normal; no murmur, click, rub or gallop   Abdomen:   Soft, no tenderness  No rebound, guarding, or rigidity  Non-distended  Bowel sounds normal  No masses or hepatosplenomegaly  No hernias present   Back: No CVA tenderness   Extremities: Extremities normal, atraumatic  No cyanosis/ clubbing  Non-pitting edema BLE     Pulses 2+ radial / 1+ DP bilateral pulses   Skin: No rashes or ulcers  Warm/ dry   Musculo-      skeletal: Gait not tested  No calf tenderness   Neuro: GCS 15. Moves all extremities x 4. No slurred speech. No facial droop. Sensation grossly intact.   No pronator drift   Psych: Alert, oriented x 3  Flat affect         Data Review      24 Hour Results:  Recent Results (from the past 24 hour(s))   GLUCOSE, POC    Collection Time: 04/24/17  3:55 AM   Result Value Ref Range    Glucose (POC) 120 (H) 65 - 100 mg/dL    Performed by Layton Ford    POC INR    Collection Time: 04/24/17  4:01 AM   Result Value Ref Range    INR (POC) 1.4 (H) <1.2     CBC WITH AUTOMATED DIFF    Collection Time: 04/24/17  4:08 AM   Result Value Ref Range    WBC 11.2 (H) 3.6 - 11.0 K/uL    RBC 3.35 (L) 3.80 - 5.20 M/uL    HGB 10.7 (L) 11.5 - 16.0 g/dL    HCT 33.0 (L) 35.0 - 47.0 %    MCV 98.5 80.0 - 99.0 FL    MCH 31.9 26.0 - 34.0 PG    MCHC 32.4 30.0 - 36.5 g/dL    RDW 16.0 (H) 11.5 - 14.5 %    PLATELET 539 036 - 684 K/uL    NEUTROPHILS 78 (H) 32 - 75 %    LYMPHOCYTES 8 (L) 12 - 49 %    MONOCYTES 13 5 - 13 %    EOSINOPHILS 1 0 - 7 %    BASOPHILS 0 0 - 1 %    ABS. NEUTROPHILS 8.7 (H) 1.8 - 8.0 K/UL    ABS. LYMPHOCYTES 0.9 0.8 - 3.5 K/UL    ABS. MONOCYTES 1.5 (H) 0.0 - 1.0 K/UL    ABS. EOSINOPHILS 0.1 0.0 - 0.4 K/UL    ABS. BASOPHILS 0.0 0.0 - 0.1 K/UL   METABOLIC PANEL, COMPREHENSIVE    Collection Time: 04/24/17  4:08 AM   Result Value Ref Range    Sodium 134 (L) 136 - 145 mmol/L    Potassium 3.6 3.5 - 5.1 mmol/L    Chloride 96 (L) 97 - 108 mmol/L    CO2 28 21 - 32 mmol/L    Anion gap 10 5 - 15 mmol/L    Glucose 120 (H) 65 - 100 mg/dL    BUN 14 6 - 20 MG/DL    Creatinine 0.96 0.55 - 1.02 MG/DL    BUN/Creatinine ratio 15 12 - 20      GFR est AA >60 >60 ml/min/1.73m2    GFR est non-AA 60 (L) >60 ml/min/1.73m2    Calcium 14.2 (HH) 8.5 - 10.1 MG/DL    Bilirubin, total 0.5 0.2 - 1.0 MG/DL    ALT (SGPT) 22 12 - 78 U/L    AST (SGOT) 20 15 - 37 U/L    Alk.  phosphatase 104 45 - 117 U/L    Protein, total 7.7 6.4 - 8.2 g/dL    Albumin 2.8 (L) 3.5 - 5.0 g/dL    Globulin 4.9 (H) 2.0 - 4.0 g/dL    A-G Ratio 0.6 (L) 1.1 - 2.2     MAGNESIUM    Collection Time: 04/24/17  4:08 AM   Result Value Ref Range Magnesium 1.0 (L) 1.6 - 2.4 mg/dL   URINALYSIS W/MICROSCOPIC    Collection Time: 04/24/17  4:08 AM   Result Value Ref Range    Color YELLOW/STRAW      Appearance CLEAR CLEAR      Specific gravity 1.018 1.003 - 1.030      pH (UA) 6.0 5.0 - 8.0      Protein NEGATIVE  NEG mg/dL    Glucose NEGATIVE  NEG mg/dL    Ketone TRACE (A) NEG mg/dL    Blood MODERATE (A) NEG      Urobilinogen 1.0 0.2 - 1.0 EU/dL    Nitrites NEGATIVE  NEG      Leukocyte Esterase NEGATIVE  NEG      WBC 0-4 0 - 4 /hpf    RBC 0-5 0 - 5 /hpf    Epithelial cells FEW FEW /lpf    Bacteria 1+ (A) NEG /hpf   LACTIC ACID, PLASMA    Collection Time: 04/24/17  4:08 AM   Result Value Ref Range    Lactic acid 1.4 0.4 - 2.0 MMOL/L   BILIRUBIN, CONFIRM    Collection Time: 04/24/17  4:08 AM   Result Value Ref Range    Bilirubin UA, confirm NEGATIVE  NEG     PTH INTACT    Collection Time: 04/24/17  4:10 AM   Result Value Ref Range    Calcium 14.2 (HH) 8.5 - 10.1 MG/DL    PTH, Intact <6.3 (L) 14.0 - 72.0 pg/mL   EKG, 12 LEAD, INITIAL    Collection Time: 04/24/17  4:39 AM   Result Value Ref Range    Ventricular Rate 95 BPM    Atrial Rate 95 BPM    P-R Interval 122 ms    QRS Duration 80 ms    Q-T Interval 356 ms    QTC Calculation (Bezet) 447 ms    Calculated P Axis 61 degrees    Calculated R Axis -14 degrees    Calculated T Axis 17 degrees    Diagnosis Normal sinus rhythm  No previous ECGs available        Recent Labs      04/24/17   0408   WBC  11.2*   HGB  10.7*   HCT  33.0*   PLT  360     Recent Labs      04/24/17   0410  04/24/17   0408  04/24/17   0401   NA   --   134*   --    K   --   3.6   --    CL   --   96*   --    CO2   --   28   --    GLU   --   120*   --    BUN   --   14   --    CREA   --   0.96   --    CA  14.2*  14.2*   --    MG   --   1.0*   --    ALB   --   2.8*   --    TBILI   --   0.5   --    SGOT   --   20   --    ALT   --   22   --    INR   --    --   1.4*       Imaging  CT head without contrast:       FINDINGS:  The ventricles and sulci are normal in size, shape and configuration and  midline. There is no significant white matter disease. There is no intracranial  hemorrhage, extra-axial collection, mass, mass effect or midline shift. The  basilar cisterns are open. No acute infarct is identified. The bone windows  demonstrate no abnormalities. The visualized portions of the paranasal sinuses  and mastoid air cells are clear.     IMPRESSION: No acute findings. Assessment and Plan   1. Hypercalcemia       -  Admit to telemetry       -  IV fluid hydration with 0.9% NS       -  Repeat calcium level until corrected       -  Received Zoldrenic acid       -  Consult with oncologist with known history of lung cancer    2. Altered mental status        -  Order neurovascular checks        -  falll precautions    3. Aphasia- transient. CT head was unremarkable. -  Order dysphagia screen         4. Leukocytosis        -  Repeat CBC in a.m.    5.  Anemia- mild       -  Plan as above       -  Check stool occult blood test       -  Iron profile/ B12 / folate level    6. Depression       -  Resume on home medication    7. Dizziness.       -  Plan as noted.     8.  VTE prophylaxis       -  SCDs to BLEs             Signed by: Jesus Hyman MD    April 24, 2017 at 7:50 AM

## 2017-04-24 NOTE — CONSULTS
[facility]   [address] [facility], [address] [place] [address], [facility] [place] [facility] [place]  Phone: [phone] NOTE     Patient: Suzi Mijares MRN: [id number]  PCP: Leander Landrum MD   :     1960  Age:   61 y.o. Sex:  female      Referring physician: Sun Rawls MD  Reason for consultation: 61 y.o. female with Altered mental status  Hypercalcemia complicated by hypercalcemia   Admission Date: 2017  3:43 AM  LOS: 0 days      ASSESSMENT and PLAN :   Symptomatic Hypercalcemia :  - Malignant Hypercalcemia until proven otherwise . 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   - avoid any Ca, Vit D. Checking Vitamin D levels   - we will follow       Thank you for the consult   D/w Daughter at bedside      Subjective:   HPI: Suzi Mijares is a 61 y.o.   female who has been admitted to the hospital for AMS and dizziness  She was found to have hypercalcemia w/ Ca of 15.2 and was admitted for management   Her Ca was normal 2 weeks ago when she was admitted for Pathological fracture of R distal femur   She has been at [facility] rehab since and mobilizing with walker   Bone path reviewed  She is due to follow up w/ Dr eNll Isidro   She does not remember getting any labs in the last 2 weeks   She denies any N/V/D/cp/sob/Constipation       Past Medical Hx:   Past Medical History:   Diagnosis Date    Cancer ([facility] [address]     BCC, lung cancer    Depression     History of screening mammography 3/27/2012    Ill-defined condition     fx femur    Intention tremor     Pap smear for cervical cancer screening 3/27/2012    Stress incontinence     Tobacco abuse         Past Surgical Hx:     Past Surgical History:   Procedure Laterality Date    ENDOSCOPY, COLON, DIAGNOSTIC  2006    HX BREAST BIOPSY Left 09/23/2014    HX ORTHOPAEDIC Right 04/05/2017    biopsy of lesion    SD COLSC FLX W/REMOVAL LESION BY HOT BX FORCEPS  1/4/2013            Medications:  Prior to Admission medications    Medication Sig Start Date End Date Taking? Authorizing Provider   cetirizine (ZYRTEC) 10 mg tablet Take 10 mg by mouth daily. Yes Historical Provider   rivaroxaban (XARELTO) 15 mg tab tablet Take 15 mg by mouth daily (with breakfast). Yes Historical Provider   bisacodyl (DULCOLAX) 10 mg suppository Insert 10 mg into rectum daily as needed for Other (constipation). Yes Historical Provider   cyclobenzaprine (FLEXERIL) 10 mg tablet Take 1 Tab by mouth nightly. 4/10/17  Yes Shameka Hood MD   famotidine (PEPCID) 20 mg tablet Take 1 Tab by mouth two (2) times a day. 4/10/17  Yes Shameka Hood MD   HYDROcodone-acetaminophen (NORCO)  mg tablet Take 1 Tab by mouth every four (4) hours as needed. Max Daily Amount: 6 Tabs. 4/10/17  Yes Shameka Hood MD   polyethylene glycol (MIRALAX) 17 gram packet Take 1 Packet by mouth daily. 4/10/17  Yes Shameka Hood MD   escitalopram oxalate (LEXAPRO) 10 mg tablet Take 10 mg by mouth daily. 9/28/16  Yes Historical Provider   simvastatin (ZOCOR) 40 mg tablet TAKE 1 TAB BY MOUTH NIGHTLY. 9/22/16  Yes Huey Shaffer MD   buPROPion XL (WELLBUTRIN XL) 300 mg XL tablet Take 1 Tab by mouth every morning. 11/18/13  Yes Huey Shaffer MD   solifenacin (VESICARE) 5 mg tablet Take 5 mg by mouth daily.    Yes Historical Provider Allergies   Allergen Reactions    Tetracycline Hives     Palms and soles    Lortab [Hydrocodone-Acetaminophen] Itching       Social Hx:  reports that she quit smoking about 7 years ago. She has a 31.00 pack-year smoking history. She has never used smokeless tobacco. She reports that she does not drink alcohol or use illicit drugs. Family History   Problem Relation Age of Onset    Ovarian Cancer Mother     Elevated Lipids Mother     Clotting Disorder Mother     Cancer Mother      ovarian    Colon Cancer Father 48    Cancer Father 48     colon       Review of Systems:  A twelve point review of system was performed today. Pertinent positives and negatives are mentioned in the HPI. The reminder of the ROS is negative and noncontributory. Objective:    Vitals:    Vitals:    04/24/17 0703 04/24/17 0725 04/24/17 0800 04/24/17 0855   BP: 136/69  131/62 132/81   Pulse: 96  94 92   Resp: 20  22 20   Temp:  98.1 °F (36.7 °C)  98 °F (36.7 °C)   SpO2: 96%  94% 95%   Weight:    79.7 kg (175 lb 11.3 oz)   Height:         I&O's:     Visit Vitals    /81 (BP 1 Location: Left arm, BP Patient Position: At rest)    Pulse 92    Temp 98 °F (36.7 °C)    Resp 20    Ht 5' 3\" (1.6 m)    Wt 79.7 kg (175 lb 11.3 oz)    SpO2 95%    BMI 31.13 kg/m2       Physical Exam:  General:Alert, No distress,confused   HEENT: Eyes are PERRL. Conjunctiva without pallor ,erythema. The sclerae without icterus.  .   Neck:Supple,no mass palpable  Lungs : Clears to auscultation Bilaterally, Normal respiratory effort  CVS: RRR, S1 S2 normal, No rub, no LE edema  Abdomen: Soft, Non tender, No hepatosplenomegaly, bowel sounds present  Extremities: No cyanosis, No clubbing  MS: No joint swelling, erythema, warmth  Neurologic: confused    Laboratory Results:    Recent Labs      04/24/17   0410  04/24/17   0408  04/24/17   0401   NA   --   134*   --    K   --   3.6   --    CL   --   96*   --    CO2   --   28   --    GLU   --   120* --    BUN   --   14   --    CREA   --   0.96   --    CA  14.2*  14.2*   --    MG   --   1.0*   --    ALB   --   2.8*   --    SGOT   --   20   --    ALT   --   22   --    INR   --    --   1.4*     Recent Labs      04/24/17   0408   WBC  11.2*   HGB  10.7*   HCT  33.0*   PLT  360     Lab Results   Component Value Date/Time    Specimen Description: URINE 08/25/2009 04:12 PM     Lab Results   Component Value Date/Time    Culture result: NO GROWTH 5 DAYS 04/09/2017 11:30 AM    Culture result: NO GROWTH 2 DAYS 08/25/2009 04:12 PM     Recent Results (from the past 24 hour(s))   GLUCOSE, POC    Collection Time: 04/24/17  3:55 AM   Result Value Ref Range    Glucose (POC) 120 (H) 65 - 100 mg/dL    Performed by Maritza Kurtz    POC INR    Collection Time: 04/24/17  4:01 AM   Result Value Ref Range    INR (POC) 1.4 (H) <1.2     CBC WITH AUTOMATED DIFF    Collection Time: 04/24/17  4:08 AM   Result Value Ref Range    WBC 11.2 (H) 3.6 - 11.0 K/uL    RBC 3.35 (L) 3.80 - 5.20 M/uL    HGB 10.7 (L) 11.5 - 16.0 g/dL    HCT 33.0 (L) 35.0 - 47.0 %    MCV 98.5 80.0 - 99.0 FL    MCH 31.9 26.0 - 34.0 PG    MCHC 32.4 30.0 - 36.5 g/dL    RDW 16.0 (H) 11.5 - 14.5 %    PLATELET 790 114 - 975 K/uL    NEUTROPHILS 78 (H) 32 - 75 %    LYMPHOCYTES 8 (L) 12 - 49 %    MONOCYTES 13 5 - 13 %    EOSINOPHILS 1 0 - 7 %    BASOPHILS 0 0 - 1 %    ABS. NEUTROPHILS 8.7 (H) 1.8 - 8.0 K/UL    ABS. LYMPHOCYTES 0.9 0.8 - 3.5 K/UL    ABS. MONOCYTES 1.5 (H) 0.0 - 1.0 K/UL    ABS. EOSINOPHILS 0.1 0.0 - 0.4 K/UL    ABS.  BASOPHILS 0.0 0.0 - 0.1 K/UL   METABOLIC PANEL, COMPREHENSIVE    Collection Time: 04/24/17  4:08 AM   Result Value Ref Range    Sodium 134 (L) 136 - 145 mmol/L    Potassium 3.6 3.5 - 5.1 mmol/L    Chloride 96 (L) 97 - 108 mmol/L    CO2 28 21 - 32 mmol/L    Anion gap 10 5 - 15 mmol/L    Glucose 120 (H) 65 - 100 mg/dL    BUN 14 6 - 20 MG/DL    Creatinine 0.96 0.55 - 1.02 MG/DL    BUN/Creatinine ratio 15 12 - 20      GFR est AA >60 >60 ml/min/1.73m2    GFR est non-AA 60 (L) >60 ml/min/1.73m2    Calcium 14.2 (HH) 8.5 - 10.1 MG/DL    Bilirubin, total 0.5 0.2 - 1.0 MG/DL    ALT (SGPT) 22 12 - 78 U/L    AST (SGOT) 20 15 - 37 U/L    Alk.  phosphatase 104 45 - 117 U/L    Protein, total 7.7 6.4 - 8.2 g/dL    Albumin 2.8 (L) 3.5 - 5.0 g/dL    Globulin 4.9 (H) 2.0 - 4.0 g/dL    A-G Ratio 0.6 (L) 1.1 - 2.2     MAGNESIUM    Collection Time: 04/24/17  4:08 AM   Result Value Ref Range    Magnesium 1.0 (L) 1.6 - 2.4 mg/dL   URINALYSIS W/MICROSCOPIC    Collection Time: 04/24/17  4:08 AM   Result Value Ref Range    Color YELLOW/STRAW      Appearance CLEAR CLEAR      Specific gravity 1.018 1.003 - 1.030      pH (UA) 6.0 5.0 - 8.0      Protein NEGATIVE  NEG mg/dL    Glucose NEGATIVE  NEG mg/dL    Ketone TRACE (A) NEG mg/dL    Blood MODERATE (A) NEG      Urobilinogen 1.0 0.2 - 1.0 EU/dL    Nitrites NEGATIVE  NEG      Leukocyte Esterase NEGATIVE  NEG      WBC 0-4 0 - 4 /hpf    RBC 0-5 0 - 5 /hpf    Epithelial cells FEW FEW /lpf    Bacteria 1+ (A) NEG /hpf   LACTIC ACID, PLASMA    Collection Time: 04/24/17  4:08 AM   Result Value Ref Range    Lactic acid 1.4 0.4 - 2.0 MMOL/L   BILIRUBIN, CONFIRM    Collection Time: 04/24/17  4:08 AM   Result Value Ref Range    Bilirubin UA, confirm NEGATIVE  NEG     PTH INTACT    Collection Time: 04/24/17  4:10 AM   Result Value Ref Range    Calcium 14.2 (HH) 8.5 - 10.1 MG/DL    PTH, Intact <6.3 (L) 14.0 - 72.0 pg/mL   EKG, 12 LEAD, INITIAL    Collection Time: 04/24/17  4:39 AM   Result Value Ref Range    Ventricular Rate 95 BPM    Atrial Rate 95 BPM    P-R Interval 122 ms    QRS Duration 80 ms    Q-T Interval 356 ms    QTC Calculation (Bezet) 447 ms    Calculated P Axis 61 degrees    Calculated R Axis -14 degrees    Calculated T Axis 17 degrees    Diagnosis       Normal sinus rhythm  Nonspecific ST segment changes  No previous ECGs available  Confirmed by Kathy Pimentel MD ([id number]) on 4/24/2017 10:50:24 AM             Urine dipstick:   Lab Results Component Value Date/Time    Color YELLOW/STRAW 04/24/2017 04:08 AM    Appearance CLEAR 04/24/2017 04:08 AM    Specific gravity 1.018 04/24/2017 04:08 AM    pH (UA) 6.0 04/24/2017 04:08 AM    Protein NEGATIVE  04/24/2017 04:08 AM    Glucose NEGATIVE  04/24/2017 04:08 AM    Ketone TRACE 04/24/2017 04:08 AM    Bilirubin NEGATIVE  04/09/2017 01:17 PM    Urobilinogen 1.0 04/24/2017 04:08 AM    Nitrites NEGATIVE  04/24/2017 04:08 AM    Leukocyte Esterase NEGATIVE  04/24/2017 04:08 AM    Epithelial cells FEW 04/24/2017 04:08 AM    Bacteria 1+ 04/24/2017 04:08 AM    WBC 0-4 04/24/2017 04:08 AM    RBC 0-5 04/24/2017 04:08 AM     Medications list Personally Reviewed   [x]      Yes     []               No      Thank you for allowing us to participate in the care of this patient. We will follow patient. Please dont hesitate to call with any questions    Fileomn Martin MD  [facility]   [address] [facility] [address]  Phone - [phone]   Fax - [phone]  [email or web address]

## 2017-04-24 NOTE — PROGRESS NOTES
Patient seen by Dr Aminata Guerra this am .   Patient seen and examined and discussed with Dr Danyle Ureña

## 2017-04-24 NOTE — PROGRESS NOTES
Pt lives alone in a two story home has 6 steps at entrance. Pt owns crutches. Patient drives however her family can provide transportation to follow-up appointments. Patient states she has never used HH/SNF services in the past. Patients daughter will provide transportation at discharge. Patient was at [facility] from 4-4 through 4-11-17 for hip nail. Has history of RLL lung ca. She came in from [facility]. Referral sent to them via CCLink to confirm whether or not patient can return if appropriate. Care Management Interventions  PCP Verified by CM:  Yes  Last Visit to PCP: 03/21/17  Mode of Transport at Discharge:  (TBD)  Transition of Care Consult (CM Consult): Discharge Planning  Physical Therapy Consult: Yes  Occupational Therapy Consult: Yes  Current Support Network: Lives Alone  Discharge Location  Discharge Placement: Skilled nursing facility (TBD, probably return to [facility])

## 2017-04-24 NOTE — ED NOTES
Bedside report given to Lynnette Mckeon RN. All questions answered. Pt resting comfortably. V/S stable, and no distress noted.

## 2017-04-24 NOTE — PROGRESS NOTES
Problem: Mobility Impaired (Adult and Pediatric)  Goal: *Acute Goals and Plan of Care (Insert Text)  Physical Therapy Goals  Initiated 4/24/2017  1. Patient will move from supine to sit and sit to supine and roll side to side in bed with minimal assistance/contact guard assist within 7 day(s). 2. Patient will transfer from bed to chair and chair to bed with minimal assistance/contact guard assist using the least restrictive device within 7 day(s). 3. Patient will perform sit to stand with supervision/set-up within 7 day(s). 4. Patient will ambulate with minimal assistance/contact guard assist for 25 feet with the least restrictive device within 7 day(s). 5. Patient will improve Tinetti score 4+ points, decreasing fall risk, within 7 day(s). PHYSICAL THERAPY EVALUATION  Patient: Jesus White (89 y.o. female)  Date: 4/24/2017  Primary Diagnosis: Altered mental status  Hypercalcemia        Precautions:  Fall      ASSESSMENT : Chart reviewed and patient cleared for eval per Estela Walsh RN - unsure of pt's wb status. Patient supine in bed, agreeable to evaluation. Based on the objective data described below, the patient presents with generalized weakness, decreased activity tolerance, impaired balance, altered gait, confusion, and emotional lability. Patient with medical history including RLL neoplasm of lung, basal cell cancer, depression, and hyperlipidemia. Patient has been at [facility] s/p R femoral domi. Patient reports she \"walks really good\" with rehab team at [facility] but patient demonstrate anxiety and fear with standing transfer. Upon standing, patient avoiding weight shift to RLE; pt either holding LE off the floor or toe touch. Per PT eval 4/7, pt with TTWB status and pt unable to report whether she is able to WBAT due to confusion. Patient with soiled bed linens due to incontinence. Patient stand pivot to bedside chair with modA to allow bed linens to be changed.   Patient demonstrate difficulty with expressing ideas and answering questions; at times, pt reports awareness of her confusion. Patient return to bed with needs in reach. Anticipate return to SNF once medically stable. Two calls with no answer placed to [facility] ([phone]) in attempts to speak with rehab dept for clarification regarding WB status; no VM set up in order to leave a message. Patient will benefit from skilled intervention to address the above impairments. Patients rehabilitation potential is considered to be Fair  Factors which may influence rehabilitation potential include:   [ ]         None noted  [X]         Mental ability/status  [X]         Medical condition  [ ]         Home/family situation and support systems  [ ]         Safety awareness  [ ]         Pain tolerance/management  [ ]         Other:        PLAN :  Recommendations and Planned Interventions:  [X]           Bed Mobility Training             [X]    Neuromuscular Re-Education  [X]           Transfer Training                   [ ]    Orthotic/Prosthetic Training  [X]           Gait Training                         [ ]    Modalities  [X]           Therapeutic Exercises           [ ]    Edema Management/Control  [X]           Therapeutic Activities            [X]    Patient and Family Training/Education  [ ]           Other (comment):     Frequency/Duration: Patient will be followed by physical therapy  5 times a week to address goals. Discharge Recommendations: Skilled Nursing Facility  Further Equipment Recommendations for Discharge: TBD       SUBJECTIVE:   Patient stated I don't have a . Gorge Najjar pt asked to roll towards the window]      OBJECTIVE DATA SUMMARY:   HISTORY:    Past Medical History:   Diagnosis Date    Cancer ([facility] [address]       BCC, lung cancer    Depression      History of screening mammography 3/27/2012    Ill-defined condition       fx femur    Intention tremor      Pap smear for cervical cancer screening 3/27/2012    Stress incontinence      Tobacco abuse       Past Surgical History:   Procedure Laterality Date    ENDOSCOPY, COLON, DIAGNOSTIC   2006    HX BREAST BIOPSY Left 09/23/2014    HX ORTHOPAEDIC Right 04/05/2017     biopsy of lesion    TX COLSC FLX W/REMOVAL LESION BY HOT BX FORCEPS   1/4/2013           Prior Level of Function/Home Situation: unsure of true PLOF; pt reports living home alone but PT eval from 4/7 reports patient lives with family and does not use assistive device  Personal factors and/or comorbidities impacting plan of care:   Home Situation  Home Environment: [address] [place] [address] Name: Niles  Wheelchair Ramp: Yes  One/Two Story Residence: One story  Living Alone: No  Support Systems: Skilled nursing facility, Child(devora), Family member(s)  Patient Expects to be Discharged to[de-identified] Skilled nursing facility  Current DME Used/Available at Home: Gee Rimes, rolling, Grab bars, Raised toilet seat  Tub or Shower Type: Shower     EXAMINATION/PRESENTATION/DECISION MAKING:   Critical Behavior:  Neurologic State: Alert, Confused, Tearful  Orientation Level: Oriented to person, Disoriented to place, Disoriented to situation, Disoriented to time  Cognition: Decreased attention/concentration, Follows commands, Impulsive, Memory loss, Poor safety awareness  Safety/Judgement: Decreased awareness of environment, Decreased awareness of need for safety, Decreased awareness of need for assistance      Hearing:   Auditory  Auditory Impairment: None      Skin:  Wound dressings to RLE         Range Of Motion:  AROM: Generally decreased, functional  PROM: Within functional limits         Strength:    Strength: Generally decreased, functional     Tone & Sensation:   Tone: Normal  Sensation: Intact      Coordination:  Coordination: Generally decreased, functional     Vision:      Functional Mobility:  Bed Mobility:  Supine to Sit: Moderate assistance  Sit to Supine: Minimum assistance  Rolling: Moderate assistance; pt tearful in either direction but yell out in pain with rolling to the L     Transfers:  Sit to Stand: Minimum assistance  Stand to Sit: Minimum assistance  Stand Pivot Transfers: Moderate assistance        Balance:   Sitting: Impaired; Without support  Sitting - Static: Fair (occasional)  Sitting - Dynamic: Fair (occasional)  Standing: Impaired; With support  Standing - Static: Fair  Standing - Dynamic : Poor         Ambulation/Gait Training:  Distance (ft): 4 Feet (ft)  Assistive Device: Walker, rolling;Gait belt  Ambulation - Level of Assistance: Moderate assistance  Gait Abnormalities: Antalgic; Step to gait  Right Side Weight Bearing: Toe touch (?)  Base of Support: Narrowed  Speed/Penny: Slow;Shuffled  Step Length: Right shortened;Left shortened        Functional Measure:  Tinetti test:      Sitting Balance: 1  Arises: 0  Attempts to Rise: 0  Immediate Standing Balance: 0  Standing Balance: 1  Nudged: 0  Eyes Closed: 0  Turn 360 Degrees - Continuous/Discontinuous: 0  Turn 360 Degrees - Steady/Unsteady: 0  Sitting Down: 1  Balance Score: 3  Indication of Gait: 0  R Step Length/Height: 1  L Step Length/Height: 1  R Foot Clearance: 1  L Foot Clearance: 0  Step Symmetry: 0  Step Continuity: 0  Path: 1  Trunk: 0  Walking Time: 1  Gait Score: 5  Total Score: 8         Tinetti Test and G-code impairment scale:  Percentage of Impairment CH     0%    CI     1-19% CJ     20-39% CK     40-59% CL     60-79% CM     80-99% CN      100%   Tinetti  Score 0-28 28 23-27 17-22 12-16 6-11 1-5 0          Tinetti Tool Score Risk of Falls  <19 = High Fall Risk  19-24 = Moderate Fall Risk  25-28 = Low Fall Risk  Tinetti ME. Performance-Oriented Assessment of Mobility Problems in Elderly Patients. Grimaldo 66; [id number].  (Scoring Description: PT Bulletin Feb. 10, 1993)     Older adults: Jg Urena et al, 2009; n = [address] elderly evaluated with ABC, ERIK, ADL, and IADL)  · Mean ERIK score for males aged 69-68 years = 26.21(3.40)  · Mean ERIK score for females age 69-68 years = 25.16(4.30)  · Mean ERIK score for males over 80 years = 23.29(6.02)  · Mean ERIK score for females over 80 years = 17.20(8.32)         Pain:  Pain Scale 1: Numeric (0 - 10)  Pain Intensity 1: 0 at rest, however patient yell out in pain with light touch to either LE and when rolling to the L     Activity Tolerance:   Fair     Please refer to the flowsheet for vital signs taken during this treatment. After treatment:   [ ]         Patient left in no apparent distress sitting up in chair  [X]         Patient left in no apparent distress in bed  [X]         Call bell left within reach  [X]         Nursing notified  [ ]         Caregiver present  [ ]         Bed alarm activated      COMMUNICATION/EDUCATION:   The patients plan of care was discussed with: Registered Nurse.  [X]         Fall prevention education was provided and the patient/caregiver indicated understanding. [X]         Patient/family have participated as able in goal setting and plan of care. [X]         Patient/family agree to work toward stated goals and plan of care. [ ]         Patient understands intent and goals of therapy, but is neutral about his/her participation. [ ]         Patient is unable to participate in goal setting and plan of care.      Thank you for this referral.  Dc Arteaga, PT, DPT   Time Calculation: 30 mins

## 2017-04-24 NOTE — ED TRIAGE NOTES
Pt arrives by EMS alert and oriented x 2. Pt unable to answer questions appropriately and has periods of confusion. Pt was transported to ED for abnormal lab values (magnesium). EMS reports stable VS in route.

## 2017-04-24 NOTE — CDMP QUERY
Please clarify if this patient is being treated/managed for:    =>Metabolic Encephalopathy in the setting of hypercalcemia, AMS, requiring iv ns, calcitonin  =>Other Explanation of clinical findings  =>Unable to Determine (no explanation of clinical findings)    The medical record reflects the following clinical findings, treatment, and risk factors:    Presentation: dx hypercalemia, hx lung ca, c/o confusion, AMS,dizzy, Ca 15.2  Treatment: neurovascular checks, lab monitoring, iv ns at 200ml, calcitonin, consult oncologist, CT head    Please clarify and document your clinical opinion in the progress notes and discharge summary including the definitive and/or presumptive diagnosis, (suspected or probable), related to the above clinical findings. Please include clinical findings supporting your diagnosis.   Thank Jaciel Tenorio RN BS [address] [place] [address]     [phone]

## 2017-04-25 NOTE — PROGRESS NOTES
Chief Complaint - follow-up and management of  Lung cancer   Exam  Gen Moderate distress; confused; HEENT No mucositis; no thrush   Nodes No supraclavicular or cervical adenopathy   Chest / line sites No inflammation   Lungs Clear to auscultation   CV RRR   Abd Non-tender   Ext No edema on L; R leg swollen due to surgery   Skin No rashes   Neuro Awake and alert   Other    Time-based care: [] 25-35 min    [] > 35 mi     (Total time with >50% spent counseling/coordination)  Discussed with the following:  []     []  Nursing Staff  [] Consultant    []  Family   []  Other:  Active Medical Problems  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 However, there is a several day delay between the lowering of the calcium and the improvement in mental status. However, she has an aggressive disease, so I would like to do MRI or brain while she is here. Don't call neuro - that won't be of any use. Will stop flexeril, that could be contributing  Continue IVF and replete K  PT to follow. No need for the statin. Current medications, progress notes, vital signs, radiology, and labs reviewed.

## 2017-04-25 NOTE — PROGRESS NOTES
Hospitalist Progress Note         Chantal Tolliver MD     Call physician on-call through the  7pm-7am    Daily Progress Note: 4/25/2017    Admission summary and hospital course   Britney Gomez is a 61 y.o. white female with past medical history of lung cancer, s/p repair of pathologic femur fracture, depression, intention tremor, stress incontinence presented with reported altered mental status and chief complaint of dizziness. Patient is a limited historian. Assessment/Plan:     1. Hypercalcemia  - IV fluid hydration with 0.9% NS  -  Calcitonin but patient refusing   - Received Zoldrenic acid  - nephrology on board   Secondary to malignancy , PTH suppressed      2. Altered mental status, confusion and paranoid , metabolic encephalopathy so far   No improvement even if calcium improved   Day 2   MRI with no mets in 12/16   If no improvement in day or so , will repeat MRI / neuro consult   Discussed with Dr Martha Neri      3. Hypokalemia : replace po       4. Hypo, Phosphatemia   - replace      5. Anemia- mild  - Check stool occult blood test  - Iron profile/ B12 / folate level     6. Depression  lexapro and well butrin      7. 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 8 Chronic DVT : between feb and April   On xarelto        VTE prophylaxis: SCD/lovenox  Discussed plan of care with Patient/Family and Nurse   Pre-admission lived at   Discharge planning:PT/OT          Subjective: The patient is without any acute complaints at this time.     Very confused and paranoid   Not letting me to explain her the diagnosis   Wanted to know where her daughter is     Review of Systems:   A comprehensive review of systems was negative except mentioned in A/P    Objective:   Physical Exam:     Visit Vitals    /61 (BP 1 Location: Left arm, BP Patient Position: At rest)    Pulse 100    Temp 98.1 °F (36.7 °C)    Resp 16    Ht 5' 3\" (1.6 m)    Wt 81.7 kg (180 lb 1.9 oz)    SpO2 91%    Breastfeeding No    BMI 31.91 kg/m2      O2 Device: Room air    Temp (24hrs), Av.5 °F (36.9 °C), Min:98.1 °F (36.7 °C), Max:99.6 °F (37.6 °C)    701 - 1900  In: 480 [P.O.:480]  Out: 200 [Urine:200]   1901 -  0700  In: 1366.7 [I.V.:1366.7]  Out: -     General:   confused    Lungs:   Clear to auscultation bilaterally. Chest wall:  No tenderness or deformity. Heart:  Regular rate and rhythm, S1, S2 normal, no murmur, click, rub or gallop. Abdomen:   Soft, non-tender. Bowel sounds normal. No masses,  No organomegaly. Extremities: Extremities normal, atraumatic, no cyanosis or edema. Pulses: 2+ and symmetric all extremities. Skin: Skin color, texture, turgor normal. No rashes or lesions   Neurologic: CNII-XII intact. Data Review:       Recent Days:  Recent Labs      17   0548  17   0408   WBC  10.1  11.2*   HGB  9.2*  10.7*   HCT  28.7*  33.0*   PLT  256  360     Recent Labs      17   0548  17   1058  17   0410  17   0408   17   0401   NA  137  135*   --   134*   --    --    K  2.9*  3.4*   --   3.6   --    --    CL  100  102   --   96*   --    --    CO2  28  27   --   28   --    --    GLU  107*  114*   --   120*   --    --    BUN  7  13   --   14   --    --    CREA  0.63  0.78   --   0.96   --    --    CA  10.6*  12.0*  14.2*  14.2*   < >   --    MG  0.8*  1.0*   --   1.0*   --    --    PHOS  1.5*   --    --    --    --    --    ALB   --   2.4*   --   2.8*   --    --    SGOT   --    --    --   20   --    --    ALT   --    --    --   22   --    --    INR   --    --    --    --    --   1.4*    < > = values in this interval not displayed. No results for input(s): PH, PCO2, PO2, HCO3, FIO2 in the last 72 hours.     24 Hour Results:  Recent Results (from the past 24 hour(s))   METABOLIC PANEL, BASIC    Collection Time: 17  5:48 AM   Result Value Ref Range    Sodium 137 136 - 145 mmol/L Potassium 2.9 (L) 3.5 - 5.1 mmol/L    Chloride 100 97 - 108 mmol/L    CO2 28 21 - 32 mmol/L    Anion gap 9 5 - 15 mmol/L    Glucose 107 (H) 65 - 100 mg/dL    BUN 7 6 - 20 MG/DL    Creatinine 0.63 0.55 - 1.02 MG/DL    BUN/Creatinine ratio 11 (L) 12 - 20      GFR est AA >60 >60 ml/min/1.73m2    GFR est non-AA >60 >60 ml/min/1.73m2    Calcium 10.6 (H) 8.5 - 10.1 MG/DL   MAGNESIUM    Collection Time: 04/25/17  5:48 AM   Result Value Ref Range    Magnesium 0.8 (LL) 1.6 - 2.4 mg/dL   PHOSPHORUS    Collection Time: 04/25/17  5:48 AM   Result Value Ref Range    Phosphorus 1.5 (L) 2.6 - 4.7 MG/DL   CBC WITH AUTOMATED DIFF    Collection Time: 04/25/17  5:48 AM   Result Value Ref Range    WBC 10.1 3.6 - 11.0 K/uL    RBC 2.97 (L) 3.80 - 5.20 M/uL    HGB 9.2 (L) 11.5 - 16.0 g/dL    HCT 28.7 (L) 35.0 - 47.0 %    MCV 96.6 80.0 - 99.0 FL    MCH 31.0 26.0 - 34.0 PG    MCHC 32.1 30.0 - 36.5 g/dL    RDW 15.7 (H) 11.5 - 14.5 %    PLATELET 507 827 - 630 K/uL    NEUTROPHILS 81 (H) 32 - 75 %    BAND NEUTROPHILS 3 0 - 6 %    LYMPHOCYTES 9 (L) 12 - 49 %    MONOCYTES 7 5 - 13 %    EOSINOPHILS 0 0 - 7 %    BASOPHILS 0 0 - 1 %    ABS. NEUTROPHILS 8.5 (H) 1.8 - 8.0 K/UL    ABS. LYMPHOCYTES 0.9 0.8 - 3.5 K/UL    ABS. MONOCYTES 0.7 0.0 - 1.0 K/UL    ABS. EOSINOPHILS 0.0 0.0 - 0.4 K/UL    ABS.  BASOPHILS 0.0 0.0 - 0.1 K/UL    DF MANUAL      RBC COMMENTS ANISOCYTOSIS  1+        RBC COMMENTS POLYCHROMASIA  1+        WBC COMMENTS REACTIVE LYMPHS         Problem List:  Problem List as of 4/25/2017  Date Reviewed: 4/24/2017          Codes Class Noted - Resolved    * (Principal)Hypercalcemia ICD-10-CM: Z83.04  ICD-9-CM: 275.42  4/24/2017 - Present        Altered mental status ICD-10-CM: R41.82  ICD-9-CM: 780.97  4/24/2017 - Present        Femur fracture, right ([facility] ICD-10-CM: S72.91XA  ICD-9-CM: 821.00  4/4/2017 - Present        Malignant neoplasm of right lung ([facility] ICD-10-CM: C34.91  ICD-9-CM: 162.9  3/21/2017 - Present        S/P lobectomy of lung ICD-10-CM: Z90.2  ICD-9-CM: V45.89  11/23/2016 - Present    Overview Signed 11/23/2016  4:08 PM by MD Dr. Júnior Coronel, 11/17/16. Right lower lobe - mass             Hyperlipidemia ICD-10-CM: E78.5  ICD-9-CM: 272.4  11/12/2014 - Present        Colon cancer screening (Chronic) ICD-10-CM: Z12.11  ICD-9-CM: V76.51  1/4/2013 - Present    Overview Signed 1/4/2013 11:51 AM by MD Dr. Marco Antonio Coronel, 1/4/2013. polyp             Basal cell cancer ICD-10-CM: C44.91  ICD-9-CM: 173.91  3/27/2012 - Present        Pap smear for cervical cancer screening (Chronic) ICD-10-CM: Z12.4  ICD-9-CM: V76.2  3/27/2012 - Present    Overview Addendum 3/9/2016  9:16 AM by MD Dr. Kirit Coronel             History of screening mammography (Chronic) ICD-10-CM: Z92.89  ICD-9-CM: V15.89  3/27/2012 - Present    Overview Addendum 11/12/2014 10:11 AM by Suzanna Sims MD     8/14- biopsy done after.              Depression ICD-10-CM: F32.9  ICD-9-CM: 601  Unknown - Present    Overview Signed 11/11/2013 10:49 AM by MD Dr. Shaan Coronel incontinence ICD-10-CM: N39.3  ICD-9-CM: UIM2200  Unknown - Present        RESOLVED: Other and unspecified hyperlipidemia ICD-10-CM: E78.5  ICD-9-CM: 272.4  9/17/2009 - 3/27/2012        RESOLVED: Tobacco abuse ICD-10-CM: Z72.0  ICD-9-CM: 305.1  Unknown - 3/27/2012              Medications reviewed  Current Facility-Administered Medications   Medication Dose Route Frequency    sodium chloride (NS) flush 5-10 mL  5-10 mL IntraVENous Q8H    sodium chloride (NS) flush 5-10 mL  5-10 mL IntraVENous PRN    0.9% sodium chloride infusion  200 mL/hr IntraVENous CONTINUOUS    cyclobenzaprine (FLEXERIL) tablet 10 mg  10 mg Oral QHS    famotidine (PEPCID) tablet 20 mg  20 mg Oral BID    HYDROcodone-acetaminophen (NORCO)  mg tablet 1 Tab  1 Tab Oral Q4H PRN    rivaroxaban (XARELTO) tablet 20 mg  20 mg Oral DAILY    simvastatin (ZOCOR) tablet 40 mg  40 mg Oral QHS    oxybutynin chloride XL (DITROPAN XL) tablet 5 mg  5 mg Oral DAILY    calciTONIN (MIACALCIN) injection 100 Int'l Units  100 Int'l Units IntraMUSCular Q12H    buPROPion SR (WELLBUTRIN SR) tablet 150 mg  150 mg Oral BID       Care Plan discussed with: Nurse    Total time spent with patient: 30 minutes.     Luc Abreu MD

## 2017-04-25 NOTE — PROGRESS NOTES
Nephrology Progress Note  Scooby Varma  Date of Admission : 4/24/2017    CC: Follow up for hypercalcemia       Assessment and Plan     Hypercalcemia:  - likely 2/2 malignancy, PTH suppressed as expected  - ok to hold calcitonin  - cont IVF, reduce rate  - daily ca levels for now    Hypokalemia/Hypophosphatemia:  - replete PRN    Confusion:  - likely from her hypercalcemia  - resolution of confusion can take a few days after calcium has been corrected    Hypovolemia:  - cont IVF  - reduce rate  - no lasix needed    History of lung cancer:  - per oncology    Hx of pathologic right femur fracture       Interval History:  Seen and examined. Refusing calcitonin this AM.  Ca down to 10.6,  Stable UOP. Confused and paranoid at time this AM.  No appetite. No reported cp, sob, n/v/d reported. Current Medications: all current  Medications have been eviewed in EPIC  Review of Systems: Pertinent items are noted in HPI. Objective:  Vitals:    Vitals:    04/25/17 0520 04/25/17 0655 04/25/17 0759 04/25/17 0957   BP:  145/82 126/66 112/74   Pulse:  94  (!) 102   Resp:   18    Temp:   99.6 °F (37.6 °C)    SpO2:   95%    Weight: 81.7 kg (180 lb 1.9 oz)      Height:         Intake and Output:     04/23 1901 - 04/25 0700  In: 1366.7 [I.V.:1366.7]  Out: -     Physical Examination:    General: NAD,Confused  Neck:  Supple, no mass  Resp:  Lungs CTA B/L, no wheezing , normal respiratory effort  CV:  RRR,  no murmur or rub, no LE edema  GI:  Soft, NT, + Bowel sounds, no hepatosplenomegaly  Neurologic:  Non focal  Psych:             confused  Skin:  No Rash      []    High complexity decision making was performed  []    Patient is at high-risk of decompensation with multiple organ involvement    Lab Data Personally Reviewed: I have reviewed all the pertinent labs, microbiology data and radiology studies during assessment.     Recent Labs      04/25/17   0548  04/24/17   1058  04/24/17   0410  04/24/17   0408   04/24/17   0401 NA  137  135*   --   134*   --    --    K  2.9*  3.4*   --   3.6   --    --    CL  100  102   --   96*   --    --    CO2  28  27   --   28   --    --    GLU  107*  114*   --   120*   --    --    BUN  7  13   --   14   --    --    CREA  0.63  0.78   --   0.96   --    --    CA  10.6*  12.0*  14.2*  14.2*   < >   --    MG  0.8*  1.0*   --   1.0*   --    --    PHOS  1.5*   --    --    --    --    --    ALB   --   2.4*   --   2.8*   --    --    SGOT   --    --    --   20   --    --    ALT   --    --    --   22   --    --    INR   --    --    --    --    --   1.4*    < > = values in this interval not displayed.      Recent Labs      04/25/17   0548  04/24/17   0408   WBC  10.1  11.2*   HGB  9.2*  10.7*   HCT  28.7*  33.0*   PLT  256  360     Lab Results   Component Value Date/Time    Specimen Description: URINE 08/25/2009 04:12 PM     Lab Results   Component Value Date/Time    Culture result: NO GROWTH 5 DAYS 04/09/2017 11:30 AM    Culture result: NO GROWTH 2 DAYS 08/25/2009 04:12 PM     Recent Results (from the past 24 hour(s))   METABOLIC PANEL, BASIC    Collection Time: 04/24/17 10:58 AM   Result Value Ref Range    Sodium 135 (L) 136 - 145 mmol/L    Potassium 3.4 (L) 3.5 - 5.1 mmol/L    Chloride 102 97 - 108 mmol/L    CO2 27 21 - 32 mmol/L    Anion gap 6 5 - 15 mmol/L    Glucose 114 (H) 65 - 100 mg/dL    BUN 13 6 - 20 MG/DL    Creatinine 0.78 0.55 - 1.02 MG/DL    BUN/Creatinine ratio 17 12 - 20      GFR est AA >60 >60 ml/min/1.73m2    GFR est non-AA >60 >60 ml/min/1.73m2    Calcium 12.0 (H) 8.5 - 10.1 MG/DL   ALBUMIN    Collection Time: 04/24/17 10:58 AM   Result Value Ref Range    Albumin 2.4 (L) 3.5 - 5.0 g/dL   MAGNESIUM    Collection Time: 04/24/17 10:58 AM   Result Value Ref Range    Magnesium 1.0 (L) 1.6 - 2.4 mg/dL   TSH 3RD GENERATION    Collection Time: 04/24/17 10:58 AM   Result Value Ref Range    TSH 0.59 0.36 - 3.74 uIU/mL   AMMONIA    Collection Time: 04/24/17 10:58 AM   Result Value Ref Range    Ammonia <10 <78 UMOL/L   METABOLIC PANEL, BASIC    Collection Time: 04/25/17  5:48 AM   Result Value Ref Range    Sodium 137 136 - 145 mmol/L    Potassium 2.9 (L) 3.5 - 5.1 mmol/L    Chloride 100 97 - 108 mmol/L    CO2 28 21 - 32 mmol/L    Anion gap 9 5 - 15 mmol/L    Glucose 107 (H) 65 - 100 mg/dL    BUN 7 6 - 20 MG/DL    Creatinine 0.63 0.55 - 1.02 MG/DL    BUN/Creatinine ratio 11 (L) 12 - 20      GFR est AA >60 >60 ml/min/1.73m2    GFR est non-AA >60 >60 ml/min/1.73m2    Calcium 10.6 (H) 8.5 - 10.1 MG/DL   MAGNESIUM    Collection Time: 04/25/17  5:48 AM   Result Value Ref Range    Magnesium 0.8 (LL) 1.6 - 2.4 mg/dL   PHOSPHORUS    Collection Time: 04/25/17  5:48 AM   Result Value Ref Range    Phosphorus 1.5 (L) 2.6 - 4.7 MG/DL   CBC WITH AUTOMATED DIFF    Collection Time: 04/25/17  5:48 AM   Result Value Ref Range    WBC 10.1 3.6 - 11.0 K/uL    RBC 2.97 (L) 3.80 - 5.20 M/uL    HGB 9.2 (L) 11.5 - 16.0 g/dL    HCT 28.7 (L) 35.0 - 47.0 %    MCV 96.6 80.0 - 99.0 FL    MCH 31.0 26.0 - 34.0 PG    MCHC 32.1 30.0 - 36.5 g/dL    RDW 15.7 (H) 11.5 - 14.5 %    PLATELET 439 335 - 963 K/uL    NEUTROPHILS 81 (H) 32 - 75 %    BAND NEUTROPHILS 3 0 - 6 %    LYMPHOCYTES 9 (L) 12 - 49 %    MONOCYTES 7 5 - 13 %    EOSINOPHILS 0 0 - 7 %    BASOPHILS 0 0 - 1 %    ABS. NEUTROPHILS 8.5 (H) 1.8 - 8.0 K/UL    ABS. LYMPHOCYTES 0.9 0.8 - 3.5 K/UL    ABS. MONOCYTES 0.7 0.0 - 1.0 K/UL    ABS. EOSINOPHILS 0.0 0.0 - 0.4 K/UL    ABS. BASOPHILS 0.0 0.0 - 0.1 K/UL    DF MANUAL      RBC COMMENTS ANISOCYTOSIS  1+        RBC COMMENTS POLYCHROMASIA  1+        WBC COMMENTS REACTIVE LYMPHS                   Renate Kim MD  [place] [id number] [address]  Phone - [phone]   Fax - [phone]  [email or web address]

## 2017-04-25 NOTE — PROGRESS NOTES
Bedside shift change report given to Ilana Puente RN (oncoming nurse) by Kel De RN (offgoing nurse). Report included the following information SBAR, Kardex, Intake/Output, Recent Results and Cardiac Rhythm NSR.

## 2017-04-25 NOTE — PROGRESS NOTES
Problem: Mobility Impaired (Adult and Pediatric)  Goal: *Acute Goals and Plan of Care (Insert Text)  Physical Therapy Goals  Initiated 4/24/2017  1. Patient will move from supine to sit and sit to supine and roll side to side in bed with minimal assistance/contact guard assist within 7 day(s). 2. Patient will transfer from bed to chair and chair to bed with minimal assistance/contact guard assist using the least restrictive device within 7 day(s). 3. Patient will perform sit to stand with supervision/set-up within 7 day(s). 4. Patient will ambulate with minimal assistance/contact guard assist for 25 feet with the least restrictive device within 7 day(s). 5. Patient will improve Tinetti score 4+ points, decreasing fall risk, within 7 day(s). PHYSICAL THERAPY TREATMENT/CO TX WITH OT  Patient: Geetha Mchugh (71 y.o. female)  Date: 4/25/2017  Diagnosis: Altered mental status  Hypercalcemia Hypercalcemia       Precautions: TTWB RLE, fall, bed alarm      ASSESSMENT:  Pt received in bed agreeable to PT confused but agreeable to mobility. See OT note for comments made to OT while I was briefly out of the room gathering equipment. . TTWB on RLE precautions were reviewed with pt before she got OOB. Pt came to sitting at EOB with min assist and with much cueing for technique she stood to a rolling walker with min assist X 2 with some noted difficulty adhering to TTWB on her RLE. She then amb over to the bedside commode with min assist X 2 with occasion cues for TTWB but actually did a fairly good job of adhering to TTWB. We discussed possibility of pt spending time up to the chair post session but she became emotional and talked about having to find her daughter. Given her level of confusion, deemed not appropriate to leave her up to the chair at this time. She again did a fairly good job with TTWB for return to bed from the bedside commode with min assist X 2. Will require rehab.   Progression toward goals:  [ ] Improving appropriately and progressing toward goals  [X]    Improving slowly and progressing toward goals  [ ]    Not making progress toward goals and plan of care will be adjusted       PLAN:  Patient continues to benefit from skilled intervention to address the above impairments. Continue treatment per established plan of care. Discharge Recommendations:  Skilled Nursing Facility  Further Equipment Recommendations for Discharge:  None if SNF       SUBJECTIVE:   Patient stated I haven't been in the hospital in a long time.  Pt confused . OBJECTIVE DATA SUMMARY:   Chart checked, pt cleared by nursing. Spoke with Melissa Shaffer PTA at [facility] who checked with Mary Jane Bernal, PT who is familiar with pt and Didier Hollis reports that pt is still TTWB on RLE. Critical Behavior:  Neurologic State: Alert, Confused  Orientation Level: Oriented to person, Disoriented to place, Disoriented to situation, Disoriented to time  Cognition: Decreased attention/concentration (refuses due to paranoid type behavior and confusion)  Safety/Judgement: Fall prevention  Functional Mobility Training:  Bed Mobility:     Supine to Sit: Minimum assistance  Sit to Supine: Minimum assistance           Transfers:  Sit to Stand: Assist x2;Minimum assistance  Stand to Sit: Assist x2;Minimum assistance                             Balance:  Sitting: Intact  Standing: Impaired  Standing - Static: Fair  Standing - Dynamic : Fair  Ambulation/Gait Training:  Distance (ft): 4 Feet (ft) (2 feet X 2)  Assistive Device: Gait belt;Walker, rolling  Ambulation - Level of Assistance: Assist x2;Minimal assistance        Gait Abnormalities: Antalgic; Step to gait  Right Side Weight Bearing:  Toe touch     Base of Support: Narrowed     Speed/Penny: Slow                                To and from bedside commode   Stairs:                       Neuro Re-Education:     Therapeutic Exercises:      Pain:  Pain Scale 1: Numeric (0 - 10)  Pain Intensity 1: 0 Activity Tolerance:   VSS  Please refer to the flowsheet for vital signs taken during this treatment.   After treatment:   [ ]    Patient left in no apparent distress sitting up in chair  [X]    Patient left in no apparent distress in bed  [X]    Call bell left within reach  [X]    Nursing notified  [ ]    Caregiver present  [X]    Bed alarm activated      COMMUNICATION/COLLABORATION:   The patients plan of care was discussed with: Occupational Therapist and Registered Nurse     Chuy Seo   Time Calculation: 37 mins

## 2017-04-25 NOTE — PROGRESS NOTES
Problem: Self Care Deficits Care Plan (Adult)  Goal: *Acute Goals and Plan of Care (Insert Text)  Occupational Therapy Goals  Initiated 4/25/2017     1. Patient will perform lower body dressing using AE at supervision/set-up level within 7 days. 2. Patient will perform toilet transfers at supervision/set-up level maintaining TTWB on the RW using Walkers, Type: RW within 7 days. 3. Patient will perform all aspects of toileting at supervision/set-up level within 7 days. 4. Patient will demonstrate TTWB precautions without cues within 7 days. 5. Patient will participate in [facility] for cognitive assessment within 7 days. 6. Patient will complete UE AROM HEP using theraband with supervision within 7 days. OCCUPATIONAL THERAPY EVALUATION  Patient: Gracie Smith (6 y.o. female)  Date: 4/25/2017  Primary Diagnosis: Altered mental status  Hypercalcemia        Precautions:   TTWB R LE (prior R hip fracture)  *PT called facility and verified TTWB on the R LE this date      ASSESSMENT :  Based on the objective data described below, the patient presents with confusion/paranoia (refusing meds from nursing with concerns over Titus Rivera is the medication coming from\" and paranoia with staff moving personal items), TTWB with prior hip fracture sustained in April 2016, generalized weakness, difficulty with TTWB in sit to stand only (does well during standing ADLs/pivots) and decreased functional reach to feet. Pt limited historian given confusion but chart review indicates pt discharged from hospital to [place] for rehab. Pt unable to recall using AE for LB dressing despite having been issued hip kit by OT on 4/7/17 and states she was IND with ADLs in rehab.   Pt is below her functional baseline, is disoriented to month/date (oriented to year), disoriented to situation/place (but has awareness that something is off in her cognitive abilities), requires MOD A for LB dressing/bathing, MIN A X 2 for functional transfers and MIN A for standing ADLs. Pt assisted to the [facility] per her request but deferred leaving pt up in chair due to safety concerns. Pt would benefit from further OT and would benefit from participating in [facility] if agreeable. Patient will benefit from skilled intervention to address the above impairments. Patients rehabilitation potential is considered to be Good  Factors which may influence rehabilitation potential include:   [ ]             None noted  [X]             Mental ability/status  [ ]             Medical condition  [ ]             Home/family situation and support systems  [ ]             Safety awareness  [ ]             Pain tolerance/management  [ ]             Other:        PLAN :  Recommendations and Planned Interventions:  [X]               Self Care Training                  [X]        Therapeutic Activities  [X]               Functional Mobility Training    [ ]        Cognitive Retraining  [X]               Therapeutic Exercises           [X]        Endurance Activities  [X]               Balance Training                   [ ]        Neuromuscular Re-Education  [ ]               Visual/Perceptual Training     [X]   Home Safety Training  [X]               Patient Education                 [X]        Family Training/Education  [ ]               Other (comment):     Frequency/Duration: Patient will be followed by occupational therapy 5 times a week to address goals. Discharge Recommendations: Skilled Nursing Facility  Further Equipment Recommendations for Discharge: TBD       SUBJECTIVE:   Patient stated I'm sorry to be a pain; I'm just not sure what this all is.       OBJECTIVE DATA SUMMARY:   HISTORY:   Past Medical History:   Diagnosis Date    Cancer ([facility]       [facility], lung cancer    Depression      History of screening mammography 3/27/2012    Ill-defined condition       fx femur    Intention tremor      Pap smear for cervical cancer screening 3/27/2012    Stress incontinence      Tobacco abuse       Past Surgical History:   Procedure Laterality Date    ENDOSCOPY, COLON, DIAGNOSTIC   2006    HX BREAST BIOPSY Left 09/23/2014    HX ORTHOPAEDIC Right 04/05/2017     biopsy of lesion    [place] COLSC FLX W/REMOVAL LESION BY HOT BX FORCEPS   1/4/2013              Prior Level of Function/Home Situation: Chart review indicates pt is IND at baseline and lives alone but sustained a pathological femur fracture and was evaluated by OT;/PT on 4/7/17 and transferred with MIN A/TTWB status. Pt discharged to [facility] for rehab and returned due to [address]; pt reported being IND with her ADLs at [place] but clearly confused regarding timeline of events and unable to provide reliable history. Expanded or extensive additional review of patient history: significant for recent femur fracture and lung cancer, recent PE and intention tremors      Home Situation  Home Environment: [address] [place] [address] Name: Milo  Wheelchair Ramp: Yes  One/Two Story Residence: One story  Living Alone: No  Support Systems: Skilled nursing facility, Child(devora), Family member(s)  Patient Expects to be Discharged to[de-identified] Skilled nursing facility  Current DME Used/Available at Home: Teddy Dexter, rolling, Grab bars, Raised toilet seat  Tub or Shower Type: Shower  [X]  Right hand dominant             [ ]  Left hand dominant     EXAMINATION OF PERFORMANCE DEFICITS:  Cognitive/Behavioral Status:  Neurologic State: Alert;Confused  Orientation Level: Oriented to person;Disoriented to place; Disoriented to situation;Disoriented to time  Cognition: Decreased attention/concentration (refuses due to paranoid type behavior and confusion)  Perception: Appears intact  Perseveration: No perseveration noted  Safety/Judgement: Fall prevention     Skin: intact     Edema: None noted     Hearing: Auditory  Auditory Impairment: None     Vision/Perceptual:         Range of Motion:  AROM: Within functional limits (UEs)     Strength:  Strength:  Within functional limits (UES)     Coordination:  Coordination: Within functional limits  Fine Motor Skills-Upper: Left Intact; Right Intact    Gross Motor Skills-Upper: Left Intact; Right Intact     Tone & Sensation:  Tone: Normal  Sensation: Intact     Balance:  Sitting: Intact  Standing: Impaired  Standing - Static: Fair  Standing - Dynamic : Fair     Functional Mobility and Transfers for ADLs:  Bed Mobility:  Supine to Sit: Minimum assistance  Sit to Supine: Minimum assistance     Transfers:  Sit to Stand: Assist x2;Minimum assistance  Stand to Sit: Assist x2;Minimum assistance  Toilet Transfer : Minimum assistance;Assist x2     ADL Assessment:  Feeding: Independent     Oral Facial Hygiene/Grooming: Independent (seated;infer min A for standing)     Bathing: Minimum assistance     Upper Body Dressing: Supervision     Lower Body Dressing: Moderate assistance     Toileting: Minimum assistance (standing balance)     ADL Intervention and task modifications: Toileting  Toileting Assistance: Minimum assistance  Bladder Hygiene: Minimum assistance (standing on L LE)  Adaptive Equipment: Walker     Cognitive Retraining  Safety/Judgement: Fall prevention     Functional Measure:  Barthel Index:      Bathin  Bladder: 10  Bowels: 10  Groomin  Dressin  Feeding: 10  Mobility: 0  Stairs: 0  Toilet Use: 5  Transfer (Bed to Chair and Back): 10  Total: 55         Barthel and G-code impairment scale:  Percentage of impairment CH  0% CI  1-19% CJ  20-39% CK  40-59% CL  60-79% CM  80-99% CN  100%   Barthel Score 0-100 100 99-80 79-60 59-40 20-39 1-19    0   Barthel Score 0-20 20 17-19 13-16 9-12 5-8 1-4 0      The Barthel ADL Index: Guidelines  1. The index should be used as a record of what a patient does, not as a record of what a patient could do. 2. The main aim is to establish degree of independence from any help, physical or verbal, however minor and for whatever reason.   3. The need for supervision renders the patient not independent. 4. A patient's performance should be established using the best available evidence. Asking the patient, friends/relatives and nurses are the usual sources, but direct observation and common sense are also important. However direct testing is not needed. 5. Usually the patient's performance over the preceding 24-48 hours is important, but occasionally longer periods will be relevant. 6. Middle categories imply that the patient supplies over 50 per cent of the effort. 7. Use of aids to be independent is allowed. Ángela Sky., Barthel, BUCKW. (7968). Functional evaluation: the Barthel Index. [facility] (14)2. Denny Sterling inna BLANCA Young, Dina Thomas., Mich Traylor., Bee, [address] (1999). Measuring the change indisability after inpatient rehabilitation; comparison of the responsiveness of the Barthel Index and Functional [place] Measure. Journal of Neurology, Neurosurgery, and Psychiatry, 66(4), 928-388. Sylvain Rachel, N.J.A, NATHAN Johnson, & Guzman Bush M.A. (2004.) Assessment of post-stroke quality of life in cost-effectiveness studies: The usefulness of the Barthel Index and the EuroQoL-5D. Quality of Life Research, 13, 158-09         G codes: In compliance with CMSs Claims Based Outcome Reporting, the following G-code set was chosen for this patient based on their primary functional limitation being treated: The outcome measure chosen to determine the severity of the functional limitation was the Barthel Index with a score of 55/100 which was correlated with the impairment scale.       · Self Care:               - CURRENT STATUS:    CK - 40%-59% impaired, limited or restricted               - GOAL STATUS:           CI - 1%-19% impaired, limited or restricted               - D/C STATUS:                       ---------------To be determined---------------      Occupational Therapy Evaluation Charge Determination   History Examination Decision-Making   Medium Complexity:  MEDIUM Complexity : 3-5 performance deficits relating to physical, cognitive , or psychosocial skils that result in activity limitations and / or participation restrictions MEDIUM Complexity : Patient may present with comorbidities that affect occupational performnce. Miniml to moderate modification of tasks or assistance (eg, physical or verbal ) with assesment(s) is necessary to enable patient to complete evaluation       Based on the above components, the patient evaluation is determined to be of the following complexity level: MEDIUM  Pain:  Pain Scale 1: Numeric (0 - 10)  Pain Intensity 1: 0              Activity Tolerance:   Limited by cognitive status, confusion and paranoid behavior. Denies dizziness or pain with activity. Vitals:     04/25/17 0957 04/25/17 1131   BP: 112/74 113/61   BP 1 Location:   Left arm   BP Patient Position: Pre-activity;Supine At rest   Pulse: (!) 102 100   Resp:   16   Temp:   98.1 °F (36.7 °C)   SpO2:   91%   Weight:       Height:             Please refer to the flowsheet for vital signs taken during this treatment. After treatment:   [ ] Patient left in no apparent distress sitting up in chair  [X] Patient left in no apparent distress in bed  [X] Call bell left within reach  [X] Nursing notified  [ ] Caregiver present  [ ] Bed alarm activated      COMMUNICATION/EDUCATION:   The patients plan of care was discussed with: Physical Therapist and Registered Nurse.  [X] Home safety education was provided and the patient/caregiver indicated understanding. [X] Patient/family have participated as able in goal setting and plan of care. [X] Patient/family agree to work toward stated goals and plan of care. [ ] Patient understands intent and goals of therapy, but is neutral about his/her participation. [ ] Patient is unable to participate in goal setting and plan of care. This patients plan of care is appropriate for delegation to [facility].      Thank you for this referral.  Renee Montoya, OT  Time Calculation: 35 mins

## 2017-04-25 NOTE — PROGRESS NOTES
Bedside and Verbal shift change report given to [facility] (oncoming nurse) by FRENCH Montoya (offgoing nurse). Report included the following information SBAR, Kardex, ED Summary and Recent Results.

## 2017-04-26 NOTE — PROGRESS NOTES
Hospitalist Progress Note         Markus Gayle MD     Call physician on-call through the  7pm-7am    Daily Progress Note: 4/26/2017    Admission summary and hospital course   Francois Schafer is a 61 y.o. white female with past medical history of lung cancer, s/p repair of pathologic femur fracture, depression, intention tremor, stress incontinence presented with reported altered mental status and chief complaint of dizziness. Patient is a limited historian. Assessment/Plan:     1. Hypercalcemia  - IV fluid hydration with 0.9% NS  -  Calcitonin but patient refusing   - Received Zoldrenic acid  - nephrology on board   Secondary to malignancy , PTH suppressed      2. Altered mental status, confusion and paranoid , metabolic encephalopathy so far   No improvement even if calcium improved   Day 2   MRI with no mets in 12/16   MRI ordered to look for mets     3. Hypokalemia : replace po       4. Hypo, Phosphatemia   - replace      5. Anemia- mild  - Check stool occult blood test  - Iron profile/ B12 / folate level     6. Depression  lexapro and well butrin      7. 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 8 Chronic DVT : between feb and April   On xarelto        VTE prophylaxis: SCD/lovenox  Discussed plan of care with Patient/ and Nurse   Pre-admission lived at   Discharge planning:PT/OT          Subjective: The patient is without any acute complaints at this time.     Very confused and paranoid still , though more calm   Waiting for MRI today   Daughter not at bedside today     Review of Systems:   A comprehensive review of systems was negative except mentioned in A/P    Objective:   Physical Exam:     Visit Vitals    /71 (BP 1 Location: Left arm, BP Patient Position: At rest)    Pulse 94    Temp 98.5 °F (36.9 °C)    Resp 16    Ht 5' 3\" (1.6 m)    Wt 74 kg (163 lb 2.3 oz)    SpO2 96%    Breastfeeding No    BMI 29.84 kg/m2      O2 Device: Room air    Temp (24hrs), Av.4 °F (36.9 °C), Min:98 °F (36.7 °C), Max:98.7 °F (37.1 °C)         190 -  0700  In: 5160 [P.O.:480; I.V.:4680]  Out: 850 [Urine:850]    General:   confused    Lungs:   Clear to auscultation bilaterally. Chest wall:  No tenderness or deformity. Heart:  Regular rate and rhythm, S1, S2 normal, no murmur, click, rub or gallop. Abdomen:   Soft, non-tender. Bowel sounds normal. No masses,  No organomegaly. Extremities: Extremities normal, atraumatic, no cyanosis or edema. Pulses: 2+ and symmetric all extremities. Skin: Skin color, texture, turgor normal. No rashes or lesions   Neurologic: CNII-XII intact. Data Review:       Recent Days:  Recent Labs      17   0522  17   0548  17   0408   WBC  8.7  10.1  11.2*   HGB  8.9*  9.2*  10.7*   HCT  28.5*  28.7*  33.0*   PLT  215  256  360     Recent Labs      17   0444  17   0548  17   1058   17   0408   17   0401   NA  136  137  135*   --   134*   < >   --    K  3.2*  2.9*  3.4*   --   3.6   < >   --    CL  104  100  102   --   96*   < >   --    CO2  24  28  27   --   28   < >   --    GLU  89  107*  114*   --   120*   < >   --    BUN  8  7  13   --   14   < >   --    CREA  0.62  0.63  0.78   --   0.96   < >   --    CA  9.4  10.6*  12.0*   < >  14.2*   --    --    MG  1.3*  0.8*  1.0*   --   1.0*   < >   --    PHOS   --   1.5*   --    --    --    --    --    ALB  2.4*   --   2.4*   --   2.8*   --    --    SGOT  16   --    --    --   20   --    --    ALT  15   --    --    --   22   --    --    INR   --    --    --    --    --    --   1.4*    < > = values in this interval not displayed. No results for input(s): PH, PCO2, PO2, HCO3, FIO2 in the last 72 hours.     24 Hour Results:  Recent Results (from the past 24 hour(s))   METABOLIC PANEL, COMPREHENSIVE    Collection Time: 17  4:44 AM   Result Value Ref Range    Sodium 136 136 - 145 mmol/L    Potassium 3.2 (L) 3.5 - 5.1 mmol/L    Chloride 104 97 - 108 mmol/L    CO2 24 21 - 32 mmol/L    Anion gap 8 5 - 15 mmol/L    Glucose 89 65 - 100 mg/dL    BUN 8 6 - 20 MG/DL    Creatinine 0.62 0.55 - 1.02 MG/DL    BUN/Creatinine ratio 13 12 - 20      GFR est AA >60 >60 ml/min/1.73m2    GFR est non-AA >60 >60 ml/min/1.73m2    Calcium 9.4 8.5 - 10.1 MG/DL    Bilirubin, total 0.3 0.2 - 1.0 MG/DL    ALT (SGPT) 15 12 - 78 U/L    AST (SGOT) 16 15 - 37 U/L    Alk. phosphatase 84 45 - 117 U/L    Protein, total 6.7 6.4 - 8.2 g/dL    Albumin 2.4 (L) 3.5 - 5.0 g/dL    Globulin 4.3 (H) 2.0 - 4.0 g/dL    A-G Ratio 0.6 (L) 1.1 - 2.2     MAGNESIUM    Collection Time: 04/26/17  4:44 AM   Result Value Ref Range    Magnesium 1.3 (L) 1.6 - 2.4 mg/dL   CBC WITH AUTOMATED DIFF    Collection Time: 04/26/17  5:22 AM   Result Value Ref Range    WBC 8.7 3.6 - 11.0 K/uL    RBC 2.90 (L) 3.80 - 5.20 M/uL    HGB 8.9 (L) 11.5 - 16.0 g/dL    HCT 28.5 (L) 35.0 - 47.0 %    MCV 98.3 80.0 - 99.0 FL    MCH 30.7 26.0 - 34.0 PG    MCHC 31.2 30.0 - 36.5 g/dL    RDW 15.9 (H) 11.5 - 14.5 %    PLATELET 501 654 - 100 K/uL    NEUTROPHILS 75 32 - 75 %    LYMPHOCYTES 12 12 - 49 %    MONOCYTES 9 5 - 13 %    EOSINOPHILS 4 0 - 7 %    BASOPHILS 0 0 - 1 %    ABS. NEUTROPHILS 6.5 1.8 - 8.0 K/UL    ABS. LYMPHOCYTES 1.0 0.8 - 3.5 K/UL    ABS. MONOCYTES 0.8 0.0 - 1.0 K/UL    ABS. EOSINOPHILS 0.3 0.0 - 0.4 K/UL    ABS.  BASOPHILS 0.0 0.0 - 0.1 K/UL       Problem List:  Problem List as of 4/26/2017  Date Reviewed: 4/24/2017          Codes Class Noted - Resolved    * (Principal)Hypercalcemia ICD-10-CM: E26.08  ICD-9-CM: 275.42  4/24/2017 - Present        Altered mental status ICD-10-CM: R41.82  ICD-9-CM: 780.97  4/24/2017 - Present        Femur fracture, right ([facility] ICD-10-CM: S72.91XA  ICD-9-CM: 821.00  4/4/2017 - Present        Malignant neoplasm of right lung ([facility] ICD-10-CM: C34.91  ICD-9-CM: 162.9  3/21/2017 - Present        S/P lobectomy of lung ICD-10-CM: Z90.2  ICD-9-CM: V45.89 11/23/2016 - Present    Overview Signed 11/23/2016  4:08 PM by MD Dr. Maddie Hull, 11/17/16. Right lower lobe - mass             Hyperlipidemia ICD-10-CM: E78.5  ICD-9-CM: 272.4  11/12/2014 - Present        Colon cancer screening (Chronic) ICD-10-CM: Z12.11  ICD-9-CM: V76.51  1/4/2013 - Present    Overview Signed 1/4/2013 11:51 AM by MD Dr. Arabella Hull, 1/4/2013. polyp             Basal cell cancer ICD-10-CM: C44.91  ICD-9-CM: 173.91  3/27/2012 - Present        Pap smear for cervical cancer screening (Chronic) ICD-10-CM: Z12.4  ICD-9-CM: V76.2  3/27/2012 - Present    Overview Addendum 3/9/2016  9:16 AM by MD Dr. Izabel Hull             History of screening mammography (Chronic) ICD-10-CM: Z92.89  ICD-9-CM: V15.89  3/27/2012 - Present    Overview Addendum 11/12/2014 10:11 AM by Tian Encarnacion MD     8/14- biopsy done after.              Depression ICD-10-CM: F32.9  ICD-9-CM: 438  Unknown - Present    Overview Signed 11/11/2013 10:49 AM by MD Dr. Raysa Hull incontinence ICD-10-CM: N39.3  ICD-9-CM: XVI2912  Unknown - Present        RESOLVED: Other and unspecified hyperlipidemia ICD-10-CM: E78.5  ICD-9-CM: 272.4  9/17/2009 - 3/27/2012        RESOLVED: Tobacco abuse ICD-10-CM: Z72.0  ICD-9-CM: 305.1  Unknown - 3/27/2012              Medications reviewed  Current Facility-Administered Medications   Medication Dose Route Frequency    potassium chloride SR (KLOR-CON 10) tablet 40 mEq  40 mEq Oral DAILY    sodium chloride (NS) flush 5-10 mL  5-10 mL IntraVENous Q8H    sodium chloride (NS) flush 5-10 mL  5-10 mL IntraVENous PRN    0.9% sodium chloride infusion  100 mL/hr IntraVENous CONTINUOUS    famotidine (PEPCID) tablet 20 mg  20 mg Oral BID    HYDROcodone-acetaminophen (NORCO)  mg tablet 1 Tab  1 Tab Oral Q4H PRN    rivaroxaban (XARELTO) tablet 20 mg  20 mg Oral DAILY    oxybutynin chloride XL (DITROPAN XL) tablet 5 mg  5 mg Oral DAILY    buPROPion SR [facility] SR) tablet 150 mg  150 mg Oral BID       Care Plan discussed with: Nurse    Total time spent with patient: 30 minutes.     Azael Peña MD

## 2017-04-26 NOTE — PROGRESS NOTES
Problem: Mobility Impaired (Adult and Pediatric)  Goal: *Acute Goals and Plan of Care (Insert Text)  Physical Therapy Goals  Initiated 4/24/2017  1. Patient will move from supine to sit and sit to supine and roll side to side in bed with minimal assistance/contact guard assist within 7 day(s). 2. Patient will transfer from bed to chair and chair to bed with minimal assistance/contact guard assist using the least restrictive device within 7 day(s). 3. Patient will perform sit to stand with supervision/set-up within 7 day(s). 4. Patient will ambulate with minimal assistance/contact guard assist for 25 feet with the least restrictive device within 7 day(s). 5. Patient will improve Tinetti score 4+ points, decreasing fall risk, within 7 day(s). PHYSICAL THERAPY TREATMENT  Patient: Charity Coyne (89 y.o. female)  Date: 4/26/2017  Diagnosis: Altered mental status  Hypercalcemia Hypercalcemia       Precautions: TTWB      ASSESSMENT:  Patient making slow but steady progress toward goals. Patient familiar to this PT from previous admit after fracture at [facility]. Patient previous rather anxious regarding mobility and continues to demonstrate this during session but is overall agreeable to mobilize OOB. Supine to sit with Jocelynn and additional time to complete task. Sit to stand with CGA and able to maintain TTWB without much difficulty. Able to amb approx 4 feet to commode and an additional 4 feet to bedside chair. Reports high pain levels and intermittently tearful during session. Patient left up in chair with needs in reach and notified RN of patient's pain and request for pain meds. Recommend return to SNF setting at [place].   Progression toward goals:  [ ]    Improving appropriately and progressing toward goals  [X]    Improving slowly and progressing toward goals  [ ]    Not making progress toward goals and plan of care will be adjusted       PLAN:  Patient continues to benefit from skilled intervention to address the above impairments. Continue treatment per established plan of care. Discharge Recommendations:  Skilled Nursing Facility  Further Equipment Recommendations for Discharge:  TBD       SUBJECTIVE:   Patient stated I have so much pain.       OBJECTIVE DATA SUMMARY:   Critical Behavior:  Neurologic State: Alert, Confused  Orientation Level: Oriented X4  Cognition: Memory loss  Safety/Judgement: Fall prevention  Functional Mobility Training:  Bed Mobility:     Supine to Sit: Minimum assistance              Transfers:  Sit to Stand: Contact guard assistance; Additional time;Assist x1  Stand to Sit: Contact guard assistance;Assist x1                             Balance:  Sitting: Intact  Standing: Impaired  Standing - Static: Constant support; Fair  Standing - Dynamic : Fair  Ambulation/Gait Training:  Distance (ft): 4 Feet (ft) (x 2)  Assistive Device: Gait belt;Walker, rolling  Ambulation - Level of Assistance: Contact guard assistance; Additional time;Assist x1        Gait Abnormalities: Antalgic;Decreased step clearance; Step to gait  Right Side Weight Bearing: Toe touch     Base of Support: Narrowed     Speed/Penny: Slow                 Pain:  Pain Scale 1: Numeric (0 - 10)  Pain Intensity 1: 0              Activity Tolerance:   VSS-121/80 BP up in chair  Please refer to the flowsheet for vital signs taken during this treatment.   After treatment:   [X]    Patient left in no apparent distress sitting up in chair  [ ]    Patient left in no apparent distress in bed  [X]    Call bell left within reach  [X]    Nursing notified  [ ]    Caregiver present  [X]    Chair alarm activated      COMMUNICATION/COLLABORATION:   The patients plan of care was discussed with: Physical Therapist and Registered Nurse     Wally Dee, PT, DPT   Time Calculation: 18 mins

## 2017-04-26 NOTE — PROGRESS NOTES
Bedside and Verbal shift change report given to Manjula Ramirez (oncoming nurse) by Alannah Driscoll RN (offgoing nurse). Report included the following information SBAR, Kardex, Procedure Summary, MAR and Recent Results.

## 2017-04-26 NOTE — CDMP QUERY
Please clarify if this patient is being treated/managed for:    =>Hypomagnesium in the setting of Mg 0.8, 1.0 requiring iv mag sulfate, lab monitoring  =>Other Explanation of clinical findings  =>Unable to Determine (no explanation of clinical findings)    The medical record reflects the following clinical findings, treatment, and risk factors:    Presentation:dx hypercalcemia, AMS, malignancy, Mg 0.8, 1.0    Treatment: iv mag sulfate, lab monitoring    Please clarify and document your clinical opinion in the progress notes and discharge summary including the definitive and/or presumptive diagnosis, (suspected or probable), related to the above clinical findings. Please include clinical findings supporting your diagnosis.   Thank Ga Suero RN  [address]     [phone]

## 2017-04-26 NOTE — PROGRESS NOTES
Bedside shift change report given to Nita Raymond RN (oncoming nurse) by Tarah Junior RN (offgoing nurse). Report included the following information SBAR, Procedure Summary, Intake/Output, MAR, Recent Results and Cardiac Rhythm NSR.

## 2017-04-26 NOTE — PROGRESS NOTES
CM talked to Ilana Pagan in [address] [place] [address] at [facility], [phone], in regard to if pt can return there after discharge. Per Ilana Pagan, pt may return. She asked to be kept updated as to when pt will be ready to return. Pt has [employer] [facility] [employer] and may require a new authorization.      Jessica Crum RN ACM CRM

## 2017-04-26 NOTE — PROGRESS NOTES
Chief Complaint - follow-up and management of  Lung cancer   Exam  Gen Moderate distress; confused; HEENT No mucositis; no thrush   Nodes No supraclavicular or cervical adenopathy   Chest / line sites No inflammation   Lungs Clear to auscultation   CV RRR   Abd Non-tender   Ext No edema on L; R leg swollen due to surgery   Skin No rashes   Neuro Awake and alert   Other    Time-based care: [] 25-35 min    [] > 35 mi     (Total time with >50% spent counseling/coordination)  Discussed with the following:  []     []  Nursing Staff  [] Consultant    []  Family   []  Other:  Active Medical Problems  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 Rather than back to rehab. She wants to get more systemic therapy. Her prognosis is terrible, though she is not fully aware of that. Awaiting brain MRI. I'm away until Monday; my partners will cover       Current medications, progress notes, vital signs, radiology, and labs reviewed.

## 2017-04-26 NOTE — PROGRESS NOTES
Nephrology Progress Note  Murray Varma  Date of Admission : 4/24/2017    CC: Follow up for hypercalcemia       Assessment and Plan     Hypercalcemia:  - likely 2/2 malignancy, PTH suppressed as expected  - Ca improving  - reduce IVF rate  - daily Ca levels  - no further calcitonin at this time    Hypokalemia/Hypophosphatemia:  - replete K today  - repeat phos level in AM    Confusion:  - likely from her hypercalcemia  - resolution of confusion can take a few days after calcium has been corrected    Hypovolemia:  - cont IVF  - reduce rate  - no lasix needed    History of lung cancer:  - per oncology    Hx of pathologic right femur fracture       Interval History:  Seen and examined. Ca improving. Confusion seems a little better. No cp, sob, n/v/d reported. Current Medications: all current  Medications have been eviewed in EPIC  Review of Systems: Pertinent items are noted in HPI. Objective:  Vitals:    Vitals:    04/25/17 2322 04/26/17 0316 04/26/17 0718 04/26/17 1121   BP: 114/68 123/73 118/71 120/71   Pulse: (!) 102 95 (!) 103 94   Resp: 16 18 18 16   Temp: 98.3 °F (36.8 °C) 98.7 °F (37.1 °C) 98 °F (36.7 °C) 98.5 °F (36.9 °C)   SpO2: 95% 92% 94% 96%   Weight:  74 kg (163 lb 2.3 oz)     Height:         Intake and Output:     04/24 1901 - 04/26 0700  In: 5160 [P.O.:480; I.V.:4680]  Out: 850 [Urine:850]    Physical Examination:    General: NAD,Confused  Neck:  Supple, no mass  Resp:  Lungs CTA B/L, no wheezing , normal respiratory effort  CV:  RRR,  no murmur or rub, no LE edema  GI:  Soft, NT, + Bowel sounds, no hepatosplenomegaly  Neurologic:  Non focal  Psych:             confused  Skin:  No Rash      []    High complexity decision making was performed  []    Patient is at high-risk of decompensation with multiple organ involvement    Lab Data Personally Reviewed: I have reviewed all the pertinent labs, microbiology data and radiology studies during assessment.     Recent Labs      04/26/17   9735 04/25/17   0548  04/24/17   1058   04/24/17   0408   04/24/17   0401   NA  136  137  135*   --   134*   < >   --    K  3.2*  2.9*  3.4*   --   3.6   < >   --    CL  104  100  102   --   96*   < >   --    CO2  24  28  27   --   28   < >   --    GLU  89  107*  114*   --   120*   < >   --    BUN  8  7  13   --   14   < >   --    CREA  0.62  0.63  0.78   --   0.96   < >   --    CA  9.4  10.6*  12.0*   < >  14.2*   --    --    MG  1.3*  0.8*  1.0*   --   1.0*   < >   --    PHOS   --   1.5*   --    --    --    --    --    ALB  2.4*   --   2.4*   --   2.8*   --    --    SGOT  16   --    --    --   20   --    --    ALT  15   --    --    --   22   --    --    INR   --    --    --    --    --    --   1.4*    < > = values in this interval not displayed. Recent Labs      04/26/17   0522  04/25/17   0548  04/24/17   0408   WBC  8.7  10.1  11.2*   HGB  8.9*  9.2*  10.7*   HCT  28.5*  28.7*  33.0*   PLT  215  256  360     Lab Results   Component Value Date/Time    Specimen Description: URINE 08/25/2009 04:12 PM     Lab Results   Component Value Date/Time    Culture result: NO GROWTH 5 DAYS 04/09/2017 11:30 AM    Culture result: NO GROWTH 2 DAYS 08/25/2009 04:12 PM     Recent Results (from the past 24 hour(s))   METABOLIC PANEL, COMPREHENSIVE    Collection Time: 04/26/17  4:44 AM   Result Value Ref Range    Sodium 136 136 - 145 mmol/L    Potassium 3.2 (L) 3.5 - 5.1 mmol/L    Chloride 104 97 - 108 mmol/L    CO2 24 21 - 32 mmol/L    Anion gap 8 5 - 15 mmol/L    Glucose 89 65 - 100 mg/dL    BUN 8 6 - 20 MG/DL    Creatinine 0.62 0.55 - 1.02 MG/DL    BUN/Creatinine ratio 13 12 - 20      GFR est AA >60 >60 ml/min/1.73m2    GFR est non-AA >60 >60 ml/min/1.73m2    Calcium 9.4 8.5 - 10.1 MG/DL    Bilirubin, total 0.3 0.2 - 1.0 MG/DL    ALT (SGPT) 15 12 - 78 U/L    AST (SGOT) 16 15 - 37 U/L    Alk.  phosphatase 84 45 - 117 U/L    Protein, total 6.7 6.4 - 8.2 g/dL    Albumin 2.4 (L) 3.5 - 5.0 g/dL    Globulin 4.3 (H) 2.0 - 4.0 g/dL    A-G Ratio 0.6 (L) 1.1 - 2.2     MAGNESIUM    Collection Time: 04/26/17  4:44 AM   Result Value Ref Range    Magnesium 1.3 (L) 1.6 - 2.4 mg/dL   CBC WITH AUTOMATED DIFF    Collection Time: 04/26/17  5:22 AM   Result Value Ref Range    WBC 8.7 3.6 - 11.0 K/uL    RBC 2.90 (L) 3.80 - 5.20 M/uL    HGB 8.9 (L) 11.5 - 16.0 g/dL    HCT 28.5 (L) 35.0 - 47.0 %    MCV 98.3 80.0 - 99.0 FL    MCH 30.7 26.0 - 34.0 PG    MCHC 31.2 30.0 - 36.5 g/dL    RDW 15.9 (H) 11.5 - 14.5 %    PLATELET 174 248 - 632 K/uL    NEUTROPHILS 75 32 - 75 %    LYMPHOCYTES 12 12 - 49 %    MONOCYTES 9 5 - 13 %    EOSINOPHILS 4 0 - 7 %    BASOPHILS 0 0 - 1 %    ABS. NEUTROPHILS 6.5 1.8 - 8.0 K/UL    ABS. LYMPHOCYTES 1.0 0.8 - 3.5 K/UL    ABS. MONOCYTES 0.8 0.0 - 1.0 K/UL    ABS. EOSINOPHILS 0.3 0.0 - 0.4 K/UL    ABS. BASOPHILS 0.0 0.0 - 0.1 K/UL                 Virginia Espinosa MD  [facility]   [address] [address]  Phone - [phone]   Fax - [phone]  [email or web address]. [email or web address]

## 2017-04-27 NOTE — PROGRESS NOTES
Nephrology Progress Note  Giselle Varma  Date of Admission : 4/24/2017    CC: Follow up for hypercalcemia       Assessment and Plan     Hypercalcemia:  - likely 2/2 malignancy, PTH suppressed as expected  - Ca normal now  - d/c IVF and monitor    Hypokalemia/Hypophosphatemia:  - replete K today    Confusion:  - likely from her hypercalcemia  - resolving    Hypovolemia:  - d/c IVF and monitor    History of lung cancer:  - per oncology    Hx of pathologic right femur fracture       Interval History:  Seen and examined. Ca improving. Feeling better today. No cp, sob, n/v/d reported. Current Medications: all current  Medications have been eviewed in EPIC  Review of Systems: Pertinent items are noted in HPI. Objective:  Vitals:    Vitals:    04/26/17 2130 04/26/17 2341 04/27/17 0341 04/27/17 0725   BP: 123/79 116/70 122/75 122/70   Pulse: 91 94 93 93   Resp: 16 16 16 18   Temp: 99.3 °F (37.4 °C) 98.5 °F (36.9 °C) 99.5 °F (37.5 °C) 98.1 °F (36.7 °C)   SpO2: 98% 93% 96% 92%   Weight:   82.2 kg (181 lb 3.5 oz)    Height:         Intake and Output:     04/25 1901 - 04/27 0700  In: -   Out: 975 [Urine:975]    Physical Examination:    General: NAD,Confused  Neck:  Supple, no mass  Resp:  Lungs CTA B/L, no wheezing , normal respiratory effort  CV:  RRR,  no murmur or rub, no LE edema  GI:  Soft, NT, + Bowel sounds, no hepatosplenomegaly  Neurologic:  Non focal  Psych:             confused  Skin:  No Rash      []    High complexity decision making was performed  []    Patient is at high-risk of decompensation with multiple organ involvement    Lab Data Personally Reviewed: I have reviewed all the pertinent labs, microbiology data and radiology studies during assessment.     Recent Labs      04/27/17   0344  04/26/17   0444  04/25/17   0548   NA  135*  136  137   K  3.4*  3.2*  2.9*   CL  103  104  100   CO2  24  24  28   GLU  86  89  107*   BUN  5*  8  7   CREA  0.62  0.62  0.63   CA  9.1  9.4  10.6*   MG  1.5* 1.3*  0.8*   PHOS  1.9*   --   1.5*   ALB   --   2.4*   --    SGOT   --   16   --    ALT   --   15   --      Recent Labs      04/26/17   0522  04/25/17   0548   WBC  8.7  10.1   HGB  8.9*  9.2*   HCT  28.5*  28.7*   PLT  215  256     Lab Results   Component Value Date/Time    Specimen Description: URINE 08/25/2009 04:12 PM     Lab Results   Component Value Date/Time    Culture result: NO GROWTH 5 DAYS 04/09/2017 11:30 AM    Culture result: NO GROWTH 2 DAYS 08/25/2009 04:12 PM     Recent Results (from the past 24 hour(s))   METABOLIC PANEL, BASIC    Collection Time: 04/27/17  3:44 AM   Result Value Ref Range    Sodium 135 (L) 136 - 145 mmol/L    Potassium 3.4 (L) 3.5 - 5.1 mmol/L    Chloride 103 97 - 108 mmol/L    CO2 24 21 - 32 mmol/L    Anion gap 8 5 - 15 mmol/L    Glucose 86 65 - 100 mg/dL    BUN 5 (L) 6 - 20 MG/DL    Creatinine 0.62 0.55 - 1.02 MG/DL    BUN/Creatinine ratio 8 (L) 12 - 20      GFR est AA >60 >60 ml/min/1.73m2    GFR est non-AA >60 >60 ml/min/1.73m2    Calcium 9.1 8.5 - 10.1 MG/DL   MAGNESIUM    Collection Time: 04/27/17  3:44 AM   Result Value Ref Range    Magnesium 1.5 (L) 1.6 - 2.4 mg/dL   PHOSPHORUS    Collection Time: 04/27/17  3:44 AM   Result Value Ref Range    Phosphorus 1.9 (L) 2.6 - 4.7 MG/DL                 Pedro Coyne MD  [address]  Phone - [phone]   Fax - [phone]  www. [employer]

## 2017-04-27 NOTE — PROGRESS NOTES
Bedside and Verbal shift change report given to Luz Marina Spangler RN (oncoming nurse) by Nayan Garcia RN (offgoing nurse). Report included the following information SBAR, Kardex, Intake/Output, MAR and Recent Results.

## 2017-04-27 NOTE — PROGRESS NOTES
Bedside and Verbal shift change report given to Yuri Regalado (oncoming nurse) by Anjelica Zambrano (offgoing nurse). Report included the following information SBAR, Kardex, ED Summary, Procedure Summary, Intake/Output, Recent Results, Med Rec Status and Cardiac Rhythm NSR.

## 2017-04-27 NOTE — PROGRESS NOTES
Hospitalist Progress Note         Bree Vargas MD     Call physician on-call through the  7pm-7am    Daily Progress Note: 4/27/2017    Admission summary and hospital course   Abdulaziz Kelly is a 61 y.o. white female with past medical history of lung cancer, s/p repair of pathologic femur fracture, depression, intention tremor, stress incontinence presented with reported altered mental status and chief complaint of dizziness. Patient is a limited historian. Assessment/Plan:     1. Hypercalcemia  - IV fluid hydration with 0.9% NS  -  Calcitonin but patient refusing   - Received Zoldrenic acid  - nephrology on board   Secondary to malignancy , PTH suppressed   Normal calcium on 4/27 , much improved today ,      2.  Altered mental status, confusion and paranoid , metabolic encephalopathy so far   No improvement even if calcium improved   Day 2   MRI with no mets in 12/16 and also repeat on 4/27   Patient improving , not paranoid on 4/27 but still word finding difficulty   Hoping to discharge soon by am .       3. Hypokalemia and hypomagnesemia  : replace iv andpo      4. Hypo, Phosphatemia   - replace      5. Anemia- mild  - Check stool occult blood test  - Iron profile/ B12 / folate level     6. Depression  lexapro and well butrin      7. 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 outpatient radiation possible per oncology     8 Chronic DVT : between feb and April   On xarelto        VTE prophylaxis: SCD/lovenox  Discussed plan of care with Patient/ and Nurse   Pre-admission lived at   Discharge planning:PT/OT          Subjective: The patient is without any acute complaints at this time.     Less confused , only word finding difficulty today     Daughter not at bedside today , will call     Review of Systems:   A comprehensive review of systems was negative except mentioned in A/P    Objective:   Physical Exam:     Visit Vitals    /70 (BP 1 Location: Right arm, BP Patient Position: At rest)    Pulse 93    Temp 98.1 °F (36.7 °C)    Resp 18    Ht 5' 3\" (1.6 m)    Wt 82.2 kg (181 lb 3.5 oz)    SpO2 92%    Breastfeeding No    BMI 33.15 kg/m2      O2 Device: Room air    Temp (24hrs), Av.7 °F (37.1 °C), Min:98 °F (36.7 °C), Max:99.5 °F (37.5 °C)        1901 -  0700  In: -   Out: 975 [Urine:975]    General:   confused    Lungs:   Clear to auscultation bilaterally. Chest wall:  No tenderness or deformity. Heart:  Regular rate and rhythm, S1, S2 normal, no murmur, click, rub or gallop. Abdomen:   Soft, non-tender. Bowel sounds normal. No masses,  No organomegaly. Extremities: Extremities normal, atraumatic, no cyanosis or edema. Pulses: 2+ and symmetric all extremities. Skin: Skin color, texture, turgor normal. No rashes or lesions   Neurologic: CNII-XII intact. Data Review:       Recent Days:  Recent Labs      17   0522  17   0548   WBC  8.7  10.1   HGB  8.9*  9.2*   HCT  28.5*  28.7*   PLT  215  256     Recent Labs      17   0344  17   0444  17   0548   NA  135*  136  137   K  3.4*  3.2*  2.9*   CL  103  104  100   CO2  24  24  28   GLU  86  89  107*   BUN  5*  8  7   CREA  0.62  0.62  0.63   CA  9.1  9.4  10.6*   MG  1.5*  1.3*  0.8*   PHOS  1.9*   --   1.5*   ALB   --   2.4*   --    SGOT   --   16   --    ALT   --   15   --      No results for input(s): PH, PCO2, PO2, HCO3, FIO2 in the last 72 hours.     24 Hour Results:  Recent Results (from the past 24 hour(s))   METABOLIC PANEL, BASIC    Collection Time: 17  3:44 AM   Result Value Ref Range    Sodium 135 (L) 136 - 145 mmol/L    Potassium 3.4 (L) 3.5 - 5.1 mmol/L    Chloride 103 97 - 108 mmol/L    CO2 24 21 - 32 mmol/L    Anion gap 8 5 - 15 mmol/L    Glucose 86 65 - 100 mg/dL    BUN 5 (L) 6 - 20 MG/DL    Creatinine 0.62 0.55 - 1.02 MG/DL    BUN/Creatinine ratio 8 (L) 12 - 20      GFR est AA >60 >60 ml/min/1.73m2    GFR est non-AA >60 >60 ml/min/1.73m2    Calcium 9.1 8.5 - 10.1 MG/DL   MAGNESIUM    Collection Time: 04/27/17  3:44 AM   Result Value Ref Range    Magnesium 1.5 (L) 1.6 - 2.4 mg/dL   PHOSPHORUS    Collection Time: 04/27/17  3:44 AM   Result Value Ref Range    Phosphorus 1.9 (L) 2.6 - 4.7 MG/DL       Problem List:  Problem List as of 4/27/2017  Date Reviewed: 4/24/2017          Codes Class Noted - Resolved    * (Principal)Hypercalcemia ICD-10-CM: J76.04  ICD-9-CM: 275.42  4/24/2017 - Present        Altered mental status ICD-10-CM: R41.82  ICD-9-CM: 780.97  4/24/2017 - Present        Femur fracture, right ([facility] ICD-10-CM: S72.91XA  ICD-9-CM: 821.00  4/4/2017 - Present        Malignant neoplasm of right lung ([facility] ICD-10-CM: C34.91  ICD-9-CM: 162.9  3/21/2017 - Present        S/P lobectomy of lung ICD-10-CM: Z90.2  ICD-9-CM: V45.89  11/23/2016 - Present    Overview Signed 11/23/2016  4:08 PM by MD Dr. Tank Iverson, 11/17/16. Right lower lobe - mass             Hyperlipidemia ICD-10-CM: E78.5  ICD-9-CM: 272.4  11/12/2014 - Present        Colon cancer screening (Chronic) ICD-10-CM: Z12.11  ICD-9-CM: V76.51  1/4/2013 - Present    Overview Signed 1/4/2013 11:51 AM by MD Dr. Riki Iverson, 1/4/2013. polyp             Basal cell cancer ICD-10-CM: C44.91  ICD-9-CM: 173.91  3/27/2012 - Present        Pap smear for cervical cancer screening (Chronic) ICD-10-CM: Z12.4  ICD-9-CM: V76.2  3/27/2012 - Present    Overview Addendum 3/9/2016  9:16 AM by MD Dr. Soledad Iverson             History of screening mammography (Chronic) ICD-10-CM: Z92.89  ICD-9-CM: V15.89  3/27/2012 - Present    Overview Addendum 11/12/2014 10:11 AM by Ramone Melendrez MD     8/14- biopsy done after.              Depression ICD-10-CM: F32.9  ICD-9-CM: 089  Unknown - Present    Overview Signed 11/11/2013 10:49 AM by MD Dr. Ender Iverson incontinence ICD-10-CM: N39.3  ICD-9-CM: [id number]  Unknown - Present        RESOLVED: Other and unspecified hyperlipidemia ICD-10-CM: E78.5  ICD-9-CM: 272.4  9/17/2009 - 3/27/2012        RESOLVED: Tobacco abuse ICD-10-CM: Z72.0  ICD-9-CM: 305.1  Unknown - 3/27/2012              Medications reviewed  Current Facility-Administered Medications   Medication Dose Route Frequency    potassium chloride SR (KLOR-CON 10) tablet 40 mEq  40 mEq Oral BID    potassium, sodium phosphates (NEUTRA-PHOS) packet 1 Packet  1 Packet Oral QID    pantoprazole (PROTONIX) tablet 40 mg  40 mg Oral ACB    sodium chloride (NS) flush 5-10 mL  5-10 mL IntraVENous Q8H    sodium chloride (NS) flush 5-10 mL  5-10 mL IntraVENous PRN    0.9% sodium chloride infusion  100 mL/hr IntraVENous CONTINUOUS    HYDROcodone-acetaminophen (NORCO)  mg tablet 1 Tab  1 Tab Oral Q4H PRN    rivaroxaban (XARELTO) tablet 20 mg  20 mg Oral DAILY    oxybutynin chloride XL (DITROPAN XL) tablet 5 mg  5 mg Oral DAILY    buPROPion SR (WELLBUTRIN SR) tablet 150 mg  150 mg Oral BID       Care Plan discussed with: Nurse    Total time spent with patient: 30 minutes.     Rivera Luna MD

## 2017-04-27 NOTE — PROGRESS NOTES
Chief Complaint - follow-up and management of  Lung cancer    Still confused, thinks she is going to rehab today, R leg pain is better           Exam  Gen Moderate distress; confused; HEENT No mucositis; no thrush   Nodes No supraclavicular or cervical adenopathy   Chest / line sites No inflammation   Lungs Clear to auscultation   CV RRR   Abd Non-tender   Ext No edema on L; R leg swollen due to surgery   Skin No rashes   Neuro Awake and alert   Other    Time-based care: [] 25-35 min    [] > 35 mi     (Total time with >50% spent counseling/coordination)  Discussed with the following:  []     []  Nursing Staff  [] Consultant    []  Family   []  Other:  Active Medical Problems  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 Her prognosis is terrible, though she is not fully aware of that. Current medications, progress notes, vital signs, radiology, and labs reviewed.     Mesfin Lowe MD

## 2017-04-28 NOTE — PROGRESS NOTES
Problem: Mobility Impaired (Adult and Pediatric)  Goal: *Acute Goals and Plan of Care (Insert Text)  Physical Therapy Goals  Initiated 4/24/2017  1. Patient will move from supine to sit and sit to supine and roll side to side in bed with minimal assistance/contact guard assist within 7 day(s). 2. Patient will transfer from bed to chair and chair to bed with minimal assistance/contact guard assist using the least restrictive device within 7 day(s). 3. Patient will perform sit to stand with supervision/set-up within 7 day(s). 4. Patient will ambulate with minimal assistance/contact guard assist for 25 feet with the least restrictive device within 7 day(s). 5. Patient will improve Tinetti score 4+ points, decreasing fall risk, within 7 day(s). PHYSICAL THERAPY TREATMENT  Patient: Jacek Davis (1 y.o. female)  Date: 4/28/2017  Diagnosis: Altered mental status  Hypercalcemia Hypercalcemia       Precautions: TTWB      ASSESSMENT:  Pt cleared by nsg. Pt agreeable to work with therapy, but is complaining of left flank pain (present PTA). Pt able to mobilize to edge of bed with increased time and supervision. Stands with min assist and gaits to [facility] with close CGA. Pt needed min assist for wiping and transfer to stand. Then able to gait approx 12 feet in room, NWB RLE with close CGA. Pt occasionally confused , some word finding difficulties. Pt returned to bed and instructed on ankle pumps, quad sets on LLE, pelvic tilts. Pt continues to be appropriate for snf placement due to being unsafe for discharge  Home. Will follow. Progression toward goals:  [ ]    Improving appropriately and progressing toward goals  [X]    Improving slowly and progressing toward goals  [ ]    Not making progress toward goals and plan of care will be adjusted       PLAN:  Patient continues to benefit from skilled intervention to address the above impairments. Continue treatment per established plan of care.   Discharge Recommendations:  Skilled Nursing Facility  Further Equipment Recommendations for Discharge:  tbd       SUBJECTIVE:   Patient stated This is why I cant walk too far after using the toilet, Im so tired. Glorious Emili      OBJECTIVE DATA SUMMARY:   Critical Behavior:  Neurologic State: Alert, Appropriate for age  Orientation Level: Oriented X4  Cognition: Follows commands, Memory loss  Safety/Judgement: Awareness of environment  Functional Mobility Training:  Bed Mobility:     Supine to Sit: Modified independent;Assist x1  Sit to Supine: Assist x1;Minimum assistance           Transfers:  Sit to Stand: Assist x1;Minimum assistance  Stand to Sit: Assist x1;Contact guard assistance            Balance:  Sitting: Intact  Standing - Static: Good  Standing - Dynamic : Fair  Ambulation/Gait Training:  Distance (ft): 12 Feet (ft)  Assistive Device: Walker, rolling;Gait belt              Right Side Weight Bearing: Toe touch (pt uses NWB due to fear of pain/too much weight on leg)     Base of Support: Widened;Narrowed             Pain:  Pain Scale 1: Numeric (0 - 10)  Pain Intensity 1: 6  Pain Location 1: Leg  Pain Orientation 1: Right        Activity Tolerance:   fair  Please refer to the flowsheet for vital signs taken during this treatment.   After treatment:   [ ]    Patient left in no apparent distress sitting up in chair  [X]    Patient left in no apparent distress in bed  [X]    Call bell left within reach  [X]    Nursing notified  [ ]    Caregiver present  [ ]    Bed alarm activated      COMMUNICATION/COLLABORATION:   The patients plan of care was discussed with: Occupational Therapist and Registered Nurse, JANE Dangelo, PT   Time Calculation: 20 mins

## 2017-04-28 NOTE — PROGRESS NOTES
Chief Complaint - follow-up and management of  Lung cancer    Alert, seems appropriate, worried about length of stay         Exam  Gen Moderate distress; confused; HEENT No mucositis; no thrush   Nodes No supraclavicular or cervical adenopathy   Chest / line sites No inflammation   Lungs Clear to auscultation   CV RRR   Abd Non-tender   Ext No edema on L; R leg swollen due to surgery   Skin No rashes   Neuro Awake and alert   Other    Time-based care: [] 25-35 min    [] > 35 mi     (Total time with >50% spent counseling/coordination)  Discussed with the following:  []     []  Nursing Staff  [] Consultant    []  Family   []  Other:  Active Medical Problems  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 Reina Samaniego saw pt. And will speak with Dr. Mini Cardona on Monday to coordinate care    Needs PT. .. Will request    She wants to get more systemic therapy. Her prognosis is terrible, though she is not fully aware of that. Eventual d/c to [facility] when final plans in place       Current medications, progress notes, vital signs, radiology, and labs reviewed.     Taryn Garcia MD

## 2017-04-28 NOTE — CONSULTS
Radiation Oncology  Consult    Chief Complaint: have right leg pain    History taken mostly from chart. Patient somewhat confused and not a reliable historian today. No family members present. 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 Ms Rasheeda Monroe underwent right lobectomy of the RLL in October, 2016 by Dr Jaye Pina at [facility], after being diagnosed with lung cancer. Stage and final pathology are not available today. This was followed by adjuvant chemotherapy given by Dr Onelia Jimenez with which she has received thus far 2 cycles. On 4/3/17, she underwent repair of a pathological right femoral fracture s/p ORIF. Pathology of the bone biopsy taken, showed malignant epithelioid and spindle cell neoplasm, consistent with her primary. Bone scan (4/4/17) showed uptake in the right femur and the right lateral rib. She has been receiving rehab at [facility]. She became confuse, with AMS , paranoia, depression and was admitted to [facility] with metabolic encephalopathy, hypercalcemia of 15. Work up with CT and MRI of the brain have shown no metastatic process there. She has remained confused despite calcium corrective measures. She is seen today for evaluation of post op radiation to the right femur. Patient states this area still has off and on \"shooting pain\". Claims norco has helped with the pain. Denies any headaches, visual problems, chest pain, sob, chaudhry or weight lost.   Pateint has a 30pak year smoking history and has stopped smoking 7 yrs ago. Medical History: basal cell cancer, depression, hypercholesterolemia, metastatic sarcomatoid carcimona of the lung (mets to the right femur) , pulmonary embolism and right femur fracture.   Surgical History: appendectomy, right femur repair with im domi and right lobectomy on 11/17/2016. Allergies: Tetracycline HCl   Hydrocodone-Acetaminophen  Current Medications: BuPROPion HCl ER (XL) (150 mg) Tablet SR 24 HR Oral b.i.d. Cetirizine HCl (10 mg) Capsule Oral daily   Cyclobenzaprine HCl (10 mg) Tablet Oral PRN   Ditropan XL (5 mg) Tablet SR 24 HR Oral daily   Escitalopram Oxalate (10 mg) Tablet Oral daily   Hydrocodone-Acetaminophen ( mg) Tablet Oral q 4 hours PRN   Klor-Con 10 40 meq (of 10 meq) Tablet, controlled release Oral daily   MiraLax Powder Oral daily   Pepcid (20 mg) Tablet Oral b.i.d. Solifenacin Succinate (5 mg) Tablet Oral daily   Xarelto (20 mg) Tablet Oral daily   neutra-phos Pack four times a day  PQRS Medication Reconciliation: Medications documented and reviewed  Family History: Father has experienced Cancer. Mother has experienced blood disorders, and Cancer, and hypercholesterolemia. Social History: Yes - but has quit for 7 years. Smoked for 31 years. Never drank. Review of Systems:   Constitutional Denies lack of appetite, fatigue and change in weight. Cardiovascular Denies arrhythmias, chest pain, dyspnea and edema. Respiratory Denies cough, dyspnea and wheezing. Gastrointestinal Denies abdominal pain, constipation, diarrhea, nausea and vomiting. Genitourinary (F) Denies dysuria, frequency, incontinence, nocturia and urgency. Musculoskeletal Complains of severe bone pain which is relieved or lessened by narcotic analgesics in the right femur. Denies arthritis and joint pain. Neurologic Complains of abnormal gait associated with pain. Complains of sudden memory loss associated with confusion and this condition is improving. Denies disorientation. Psychiatric Denies delusions and hallucinations. Performance Status: 40% - Disabled, needs special care and assistance. ([place]sky)  Physical Exam:   Constitutional No evidence of inadequate appearance, uncooperativeness and altered mood and affect A&O x 2; NAD. Juma Sargent    Head No evidence of alopecia. Eyes No evidence of conjunctivitis, nonreactive pupil(s) and scleral abnormalities. Neck No evidence of tender or enlarged lymph nodes, neck abnormalities and restricted range of motion. Cardiovascular No evidence of abnormal heart rate, heart arrhythmia and abnormal heart sounds. Respiratory No evidence of abnormal breath sounds Lungs clear with no wheezes or abnormal sounds. .   Abdomen No evidence of abdominal abnormalities and abnormal bowel sounds. Musculoskeletal Presents with restricted range of motion Limited range of motion in the right upper leg due to recent fracture. Some edema. Moving all others well. Neurologic No evidence of impaired cranial nerve(s) gait not tested. Unclear of her true understanding because at times she asked appropriate questions and at times, having difficulty expression self or word search. .   Psychiatric Patient had difficulty providing history. Oriented to person and place. Speech is clear; at times not so coherent. Moving all extremities but with some limitation on the right femur. .   Hematologic/Lymphatic No evidence of tender or enlarged axillae lymph nodes and tender or enlarged neck lymph nodes. VITALS: Performed on 4/27/2017 12:08 PM  BMI - 28.874 kg/m2 (HIGH) -   Height - 63 in -   Weight - 163 lbs -   Pulse - 108 /min (HIGH) -   Respiration - 18 /min -   Systolic - 894 mm(hg) -   Diastolic - 75 mm(hg) -   Pain - 6 -   Fatigue - 0 -   Fall Risk - 3 -  Vitals are within normal limits    Time and Counseling: Up to 30 minutes were spent with the patient alone, acquiring the vital information vital to the case. The patient was examined to verify findings as related in the HPI, as well as other areas as needed. Time was allowed for all questions about the proposed course of care to be answered. Greater than 50% time was spent face to face with the patient in counseling and coordination of care.      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 Agree with Dr Onelia Jimenez the patient would benefit from post op radiation to the right femur for tumor control. Discussed with the patient who asked questions but is concerned she is missing out on her treating the lung with chemotherpy. Her concerns are valid but I would try to get her back to continue on with her chemotherapy once we complete her radiation. It may be a large field in which case 3000 cGy in 10 fxs would be recommended. However, 2000 cGy in 5 may be considered. Would need to do Ct simulation first. Discussed with her about radiation and the potential side effects and risks. I am not sure of her full comprehension so I will need to discuss with her daughter and . If all agree then we could potentially simulate the patient tomorrow and begin early next week as she is well healed from surgery to move on.      Thank you for the kind referral. Will try to contact family members    Plan: Potential post op radiation if the patient and or family agrees    Blake Eaton MD [address] [place] [address]  [phone]

## 2017-04-28 NOTE — PROGRESS NOTES
Attempting to see pt who just got back to bed from [facility] with dtr. Pt stating she is very tired.   Will follow up later in pm.

## 2017-04-28 NOTE — PROGRESS NOTES
Nephrology Progress Note  Arash Varma  Date of Admission : 4/24/2017    CC: Follow up for hypercalcemia       Assessment and Plan     Hypercalcemia:  - likely 2/2 malignancy, PTH suppressed as expected  - Ca normal now  - cont to monitor daily    Hypokalemia/Hypophosphatemia:  - replete K today    Confusion:  - likely from her hypercalcemia  - resolving    Hypovolemia:  - d/c IVF and monitor    History of lung cancer:  - per oncology    Hx of pathologic right femur fracture      Will sign off case. Please call us back with any questions. Interval History:  Seen and examined. Ca stable. Feeling better today. No cp, sob, n/v/d reported. Current Medications: all current  Medications have been eviewed in EPIC  Review of Systems: Pertinent items are noted in HPI. Objective:  Vitals:    Vitals:    04/27/17 1911 04/28/17 0002 04/28/17 0400 04/28/17 0813   BP: 122/75 116/75 112/69 129/75   Pulse: 97 92 92 98   Resp: 16 16 16 16   Temp: 98.2 °F (36.8 °C) 98.1 °F (36.7 °C) 98.1 °F (36.7 °C) 98.6 °F (37 °C)   SpO2: 95% 94% 97% 98%   Weight:   80.2 kg (176 lb 12.9 oz)    Height:         Intake and Output:  04/28 0701 - 04/28 1900  In: 50 [I.V.:50]  Out: 200 [Urine:200]  04/26 1901 - 04/28 0700  In: 120 [P.O.:120]  Out: 1025 [Urine:1025]    Physical Examination:    General: NAD,Confused  Neck:  Supple, no mass  Resp:  Lungs CTA B/L, no wheezing , normal respiratory effort  CV:  RRR,  no murmur or rub, no LE edema  GI:  Soft, NT, + Bowel sounds, no hepatosplenomegaly  Neurologic:  Non focal  Psych:             confused  Skin:  No Rash      []    High complexity decision making was performed  []    Patient is at high-risk of decompensation with multiple organ involvement    Lab Data Personally Reviewed: I have reviewed all the pertinent labs, microbiology data and radiology studies during assessment.     Recent Labs      04/28/17   0234  04/27/17   0344  04/26/17   0444   NA  134*  135*  136   K  3.7  3.4* 3.2*   CL  101  103  104   CO2  24  24  24   GLU  99  86  89   BUN  6  5*  8   CREA  0.65  0.62  0.62   CA  8.7  9.1  9.4   MG  1.5*  1.5*  1.3*   PHOS   --   1.9*   --    ALB   --    --   2.4*   SGOT   --    --   16   ALT   --    --   15     Recent Labs      04/26/17   0522   WBC  8.7   HGB  8.9*   HCT  28.5*   PLT  215     Lab Results   Component Value Date/Time    Specimen Description: URINE 08/25/2009 04:12 PM     Lab Results   Component Value Date/Time    Culture result: NO GROWTH 5 DAYS 04/09/2017 11:30 AM    Culture result: NO GROWTH 2 DAYS 08/25/2009 04:12 PM     Recent Results (from the past 24 hour(s))   METABOLIC PANEL, BASIC    Collection Time: 04/28/17  2:34 AM   Result Value Ref Range    Sodium 134 (L) 136 - 145 mmol/L    Potassium 3.7 3.5 - 5.1 mmol/L    Chloride 101 97 - 108 mmol/L    CO2 24 21 - 32 mmol/L    Anion gap 9 5 - 15 mmol/L    Glucose 99 65 - 100 mg/dL    BUN 6 6 - 20 MG/DL    Creatinine 0.65 0.55 - 1.02 MG/DL    BUN/Creatinine ratio 9 (L) 12 - 20      GFR est AA >60 >60 ml/min/1.73m2    GFR est non-AA >60 >60 ml/min/1.73m2    Calcium 8.7 8.5 - 10.1 MG/DL   MAGNESIUM    Collection Time: 04/28/17  2:34 AM   Result Value Ref Range    Magnesium 1.5 (L) 1.6 - 2.4 mg/dL                 Alcira Vázquez MD  [facility]   [address] [address]  Phone - [phone]   Fax - [phone]  www. [employer]

## 2017-04-28 NOTE — PROGRESS NOTES
Problem: Falls - Risk of  Goal: *Absence of falls  Outcome: Progressing Towards Goal  Diane fall assessment completed, fall risk precautions initiated, bed in locked and lowest position, frequently used items and call bell within reach.

## 2017-04-28 NOTE — PROGRESS NOTES
Hospitalist Progress Note         Braby Cadena MD     Call physician on-call through the  7pm-7am    Daily Progress Note: 4/28/2017    Admission summary and hospital course   Itzel Angulo is a 61 y.o. white female with past medical history of lung cancer, s/p repair of pathologic femur fracture, depression, intention tremor, stress incontinence presented with reported altered mental status and chief complaint of dizziness. Patient is a limited historian. Assessment/Plan:     1. Hypercalcemia  - IV fluid hydration with 0.9% NS  -  Calcitonin but patient refusing   - Received Zoldrenic acid  - nephrology on board   Secondary to malignancy , PTH suppressed   Normal calcium on 4/27 , much improved today , nephrology signed off      2. Altered mental status, confusion and paranoid , metabolic encephalopathy so far   No improvement even if calcium improved   Day 2   MRI with no mets in 12/16 and also repeat on 4/27   Patient improving , not paranoid on 4/27 but still word finding difficulty   Hoping to discharge soon by am .       3. Hypokalemia and hypomagnesemia  : replace iv andpo      4. Hypo, Phosphatemia   - replace      5. Anemia- mild  - Check stool occult blood test  - Iron profile/ B12 / folate level     6. Depression  lexapro and well butrin      7. 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   outpatient radiation possible per oncology     8 Chronic DVT : between feb and April   On xarelto        VTE prophylaxis: SCD/lovenox  Discussed plan of care with Patient/ and Nurse   Pre-admission lived at   Discharge planning:PT/OT          Subjective:   She want to go , but Dr Josemanuel Oneil wants to treat her with radiation and probable chemo next week   Will transfer to his service Monday and he agrees     Review of Systems:   A comprehensive review of systems was negative except mentioned in A/P    Objective:   Physical Exam:     Visit Vitals    BP 112/62 (BP 1 Location: Left arm, BP Patient Position: At rest;Supine)    Pulse (!) 111    Temp 98.4 °F (36.9 °C)    Resp 20    Ht 5' 3\" (1.6 m)    Wt 80.2 kg (176 lb 12.9 oz)    SpO2 98%    Breastfeeding No    BMI 32.34 kg/m2      O2 Device: Room air    Temp (24hrs), Av.3 °F (36.8 °C), Min:98.1 °F (36.7 °C), Max:98.6 °F (37 °C)    701 - 1900  In: 50 [I.V.:50]  Out: 200 [Urine:200]   1901 -  0700  In: 120 [P.O.:120]  Out: 1025 [Urine:1025]    General:   confused    Lungs:   Clear to auscultation bilaterally. Chest wall:  No tenderness or deformity. Heart:  Regular rate and rhythm, S1, S2 normal, no murmur, click, rub or gallop. Abdomen:   Soft, non-tender. Bowel sounds normal. No masses,  No organomegaly. Extremities: Extremities normal, atraumatic, no cyanosis or edema. Pulses: 2+ and symmetric all extremities. Skin: Skin color, texture, turgor normal. No rashes or lesions   Neurologic: CNII-XII intact. Data Review:       Recent Days:  Recent Labs      17   0522   WBC  8.7   HGB  8.9*   HCT  28.5*   PLT  215     Recent Labs      17   0234  17   0344  17   0444   NA  134*  135*  136   K  3.7  3.4*  3.2*   CL  101  103  104   CO2  24  24  24   GLU  99  86  89   BUN  6  5*  8   CREA  0.65  0.62  0.62   CA  8.7  9.1  9.4   MG  1.5*  1.5*  1.3*   PHOS   --   1.9*   --    ALB   --    --   2.4*   SGOT   --    --   16   ALT   --    --   15     No results for input(s): PH, PCO2, PO2, HCO3, FIO2 in the last 72 hours.     24 Hour Results:  Recent Results (from the past 24 hour(s))   METABOLIC PANEL, BASIC    Collection Time: 17  2:34 AM   Result Value Ref Range    Sodium 134 (L) 136 - 145 mmol/L    Potassium 3.7 3.5 - 5.1 mmol/L    Chloride 101 97 - 108 mmol/L    CO2 24 21 - 32 mmol/L    Anion gap 9 5 - 15 mmol/L    Glucose 99 65 - 100 mg/dL    BUN 6 6 - 20 MG/DL    Creatinine 0.65 0.55 - 1.02 MG/DL    BUN/Creatinine ratio 9 (L) 12 - 20      GFR est AA >60 >60 ml/min/1.73m2    GFR est non-AA >60 >60 ml/min/1.73m2    Calcium 8.7 8.5 - 10.1 MG/DL   MAGNESIUM    Collection Time: 04/28/17  2:34 AM   Result Value Ref Range    Magnesium 1.5 (L) 1.6 - 2.4 mg/dL       Problem List:  Problem List as of 4/28/2017  Date Reviewed: 4/24/2017          Codes Class Noted - Resolved    * (Principal)Hypercalcemia ICD-10-CM: E03.41  ICD-9-CM: 275.42  4/24/2017 - Present        Altered mental status ICD-10-CM: R41.82  ICD-9-CM: 780.97  4/24/2017 - Present        Femur fracture, right ([facility] ICD-10-CM: S72.91XA  ICD-9-CM: 821.00  4/4/2017 - Present        Malignant neoplasm of right lung ([facility] ICD-10-CM: C34.91  ICD-9-CM: 162.9  3/21/2017 - Present        S/P lobectomy of lung ICD-10-CM: Z90.2  ICD-9-CM: V45.89  11/23/2016 - Present    Overview Signed 11/23/2016  4:08 PM by MD Dr. Marianela Collins, 11/17/16. Right lower lobe - mass             Hyperlipidemia ICD-10-CM: E78.5  ICD-9-CM: 272.4  11/12/2014 - Present        Colon cancer screening (Chronic) ICD-10-CM: Z12.11  ICD-9-CM: V76.51  1/4/2013 - Present    Overview Signed 1/4/2013 11:51 AM by MD Dr. Luisa Collins, 1/4/2013. polyp             Basal cell cancer ICD-10-CM: C44.91  ICD-9-CM: 173.91  3/27/2012 - Present        Pap smear for cervical cancer screening (Chronic) ICD-10-CM: Z12.4  ICD-9-CM: V76.2  3/27/2012 - Present    Overview Addendum 3/9/2016  9:16 AM by MD Dr. Melanie Collins             History of screening mammography (Chronic) ICD-10-CM: Z92.89  ICD-9-CM: V15.89  3/27/2012 - Present    Overview Addendum 11/12/2014 10:11 AM by Eleonora Nelson MD     8/14- biopsy done after.              Depression ICD-10-CM: F32.9  ICD-9-CM: 502  Unknown - Present    Overview Signed 11/11/2013 10:49 AM by MD Dr. Janneth Collins incontinence ICD-10-CM: N39.3  ICD-9-CM: [id number]  Unknown - Present        RESOLVED: Other and unspecified hyperlipidemia ICD-10-CM: E78.5  ICD-9-CM: 272.4 9/17/2009 - 3/27/2012        RESOLVED: Tobacco abuse ICD-10-CM: Z72.0  ICD-9-CM: 305.1  Unknown - 3/27/2012              Medications reviewed  Current Facility-Administered Medications   Medication Dose Route Frequency    cyclobenzaprine (FLEXERIL) tablet 5 mg  5 mg Oral TID PRN    potassium chloride SR (KLOR-CON 10) tablet 40 mEq  40 mEq Oral BID    potassium, sodium phosphates (NEUTRA-PHOS) packet 1 Packet  1 Packet Oral QID    pantoprazole (PROTONIX) tablet 40 mg  40 mg Oral ACB    sodium chloride (NS) flush 5-10 mL  5-10 mL IntraVENous Q8H    sodium chloride (NS) flush 5-10 mL  5-10 mL IntraVENous PRN    HYDROcodone-acetaminophen (NORCO)  mg tablet 1 Tab  1 Tab Oral Q4H PRN    rivaroxaban (XARELTO) tablet 20 mg  20 mg Oral DAILY    oxybutynin chloride XL (DITROPAN XL) tablet 5 mg  5 mg Oral DAILY    buPROPion SR (WELLBUTRIN SR) tablet 150 mg  150 mg Oral BID       Care Plan discussed with: Nurse    Total time spent with patient: 30 minutes.     Ronaldo Gaucher, MD

## 2017-04-29 NOTE — PROGRESS NOTES
Chief Complaint - follow-up and management of  Lung cancer    Alert, seems appropriate, is oriented. C/o some pain in r. 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 And mentation improved. Needs to start radiation to r. Femur while in the hospital    Dr. Marilee Hebert saw pt. And will speak with Dr. Medardo Traore on Monday to coordinate care    Needs PT. She wants to get more systemic therapy. Eventual d/c to [facility] when final plans in place       Current medications, progress notes, vital signs, radiology, and labs reviewed. Cierra Stuart MD

## 2017-04-29 NOTE — PROGRESS NOTES
TRANSFER - IN REPORT:    Verbal report received from [id number] [address]  being received from [facility] for routine progression of care      Report consisted of patients Situation, Background, Assessment and   Recommendations(SBAR). Information from the following report(s) SBAR and Kardex was reviewed with the receiving nurse. Opportunity for questions and clarification was provided. Assessment completed upon patients arrival to unit and care assumed.

## 2017-04-29 NOTE — PROGRESS NOTES
Primary Nurse Shon Austin and David Robles RN performed a dual skin assessment on this patient Impairment noted- see wound doc flow sheet  Yg score is 15  Tattoo on L upper back, dsg on R upper thigh and medial thigh, radiation marker on R mid thigh

## 2017-04-29 NOTE — PROGRESS NOTES
Hospitalist Progress Note         Alisia Lester MD     Call physician on-call through the  7pm-7am    Daily Progress Note: 4/29/2017    Admission summary and hospital course   Mita Amador is a 61 y.o. white female with past medical history of lung cancer, s/p repair of pathologic femur fracture, depression, intention tremor, stress incontinence presented with reported altered mental status and chief complaint of dizziness. Patient is a limited historian. Assessment/Plan:     1. Hypercalcemia  - IV fluid hydration with 0.9% NS  -  Calcitonin but patient refusing   - Received Zoldrenic acid  - nephrology on board   Secondary to malignancy , PTH suppressed   Normal calcium on 4/27 , much improved today , nephrology signed off   Will give some lab holidays      2. Altered mental status, confusion and paranoid , metabolic encephalopathy so far   No improvement even if calcium improved   Day 2   MRI with no mets in 12/16 and also repeat on 4/27   Patient improving , not paranoid on 4/27 but still word finding difficulty   Hoping to discharge soon by am .       3. Hypokalemia and hypomagnesemia  : replace iv andpo      4. Hypo, Phosphatemia   - replace      5. Anemia- mild  - Check stool occult blood test  - Iron profile/ B12 / folate level     6. Depression  lexapro and well butrin      7. 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   outpatient radiation possible per oncology     8 Chronic DVT : between feb and April   On xarelto        VTE prophylaxis: SCD/lovenox  Discussed plan of care with Patient/ and Nurse   Pre-admission lived at   Discharge planning:PT/OT          Subjective:   She want to go , but Dr Kayode Gunn wants to treat her with radiation and probable chemo next week   Will transfer to his service Monday and he agrees to that     Review of Systems:   A comprehensive review of systems was negative except mentioned in A/P    Objective: Physical Exam:     Visit Vitals    /75 (BP 1 Location: Left arm)    Pulse (!) 101    Temp 98.1 °F (36.7 °C)    Resp 18    Ht 5' 3\" (1.6 m)    Wt 80.6 kg (177 lb 11.1 oz)    SpO2 95%    Breastfeeding No    BMI 32.5 kg/m2      O2 Device: Room air    Temp (24hrs), Av.4 °F (36.9 °C), Min:98.1 °F (36.7 °C), Max:99 °F (37.2 °C)    701 - 1900  In: 240 [P.O.:240]  Out: -    1901 -  0700  In: 170 [P.O.:120; I.V.:50]  Out: 1025 [Urine:1025]    General:   confused    Lungs:   Clear to auscultation bilaterally. Chest wall:  No tenderness or deformity. Heart:  Regular rate and rhythm, S1, S2 normal, no murmur, click, rub or gallop. Abdomen:   Soft, non-tender. Bowel sounds normal. No masses,  No organomegaly. Extremities: Extremities normal, atraumatic, no cyanosis or edema. Pulses: 2+ and symmetric all extremities. Skin: Skin color, texture, turgor normal. No rashes or lesions   Neurologic: CNII-XII intact. Data Review:       Recent Days:  No results for input(s): WBC, HGB, HCT, PLT, HGBEXT, HCTEXT, PLTEXT, HGBEXT, HCTEXT, PLTEXT in the last 72 hours. Recent Labs      17   0405  17   0234  17   0344   NA  134*  134*  135*   K  3.7  3.7  3.4*   CL  100  101  103   CO2  24  24  24   GLU  101*  99  86   BUN  7  6  5*   CREA  0.73  0.65  0.62   CA  8.5  8.7  9.1   MG   --   1.5*  1.5*   PHOS   --    --   1.9*     No results for input(s): PH, PCO2, PO2, HCO3, FIO2 in the last 72 hours.     24 Hour Results:  Recent Results (from the past 24 hour(s))   METABOLIC PANEL, BASIC    Collection Time: 17  4:05 AM   Result Value Ref Range    Sodium 134 (L) 136 - 145 mmol/L    Potassium 3.7 3.5 - 5.1 mmol/L    Chloride 100 97 - 108 mmol/L    CO2 24 21 - 32 mmol/L    Anion gap 10 5 - 15 mmol/L    Glucose 101 (H) 65 - 100 mg/dL    BUN 7 6 - 20 MG/DL    Creatinine 0.73 0.55 - 1.02 MG/DL    BUN/Creatinine ratio 10 (L) 12 - 20      GFR est AA >60 >60 ml/min/1.73m2 GFR est non-AA >60 >60 ml/min/1.73m2    Calcium 8.5 8.5 - 10.1 MG/DL       Problem List:  Problem List as of 4/29/2017  Date Reviewed: 4/24/2017          Codes Class Noted - Resolved    * (Principal)Hypercalcemia ICD-10-CM: Y08.92  ICD-9-CM: 275.42  4/24/2017 - Present        Altered mental status ICD-10-CM: R41.82  ICD-9-CM: 780.97  4/24/2017 - Present        Femur fracture, right ([facility] [address] ICD-10-CM: S72.91XA  ICD-9-CM: 821.00  4/4/2017 - Present        Malignant neoplasm of right lung ([facility] [address] ICD-10-CM: C34.91  ICD-9-CM: 162.9  3/21/2017 - Present        S/P lobectomy of lung ICD-10-CM: Z90.2  ICD-9-CM: V45.89  11/23/2016 - Present    Overview Signed 11/23/2016  4:08 PM by MD Dr. Cody Flores, 11/17/16. Right lower lobe - mass             Hyperlipidemia ICD-10-CM: E78.5  ICD-9-CM: 272.4  11/12/2014 - Present        Colon cancer screening (Chronic) ICD-10-CM: Z12.11  ICD-9-CM: V76.51  1/4/2013 - Present    Overview Signed 1/4/2013 11:51 AM by MD Dr. Belinda Flores, 1/4/2013. polyp             Basal cell cancer ICD-10-CM: C44.91  ICD-9-CM: 173.91  3/27/2012 - Present        Pap smear for cervical cancer screening (Chronic) ICD-10-CM: Z12.4  ICD-9-CM: V76.2  3/27/2012 - Present    Overview Addendum 3/9/2016  9:16 AM by MD Dr. Luis Miguel Flores             History of screening mammography (Chronic) ICD-10-CM: Z92.89  ICD-9-CM: V15.89  3/27/2012 - Present    Overview Addendum 11/12/2014 10:11 AM by Ti Chau MD     8/14- biopsy done after.              Depression ICD-10-CM: F32.9  ICD-9-CM: 406  Unknown - Present    Overview Signed 11/11/2013 10:49 AM by MD Dr. Young Flores Sober incontinence ICD-10-CM: N39.3  ICD-9-CM: [id number]  Unknown - Present        RESOLVED: Other and unspecified hyperlipidemia ICD-10-CM: E78.5  ICD-9-CM: 272.4  9/17/2009 - 3/27/2012        RESOLVED: Tobacco abuse ICD-10-CM: Z72.0  ICD-9-CM: 305.1  Unknown - 3/27/2012              Medications reviewed  Current Facility-Administered Medications   Medication Dose Route Frequency    cyclobenzaprine (FLEXERIL) tablet 5 mg  5 mg Oral TID PRN    potassium chloride SR (KLOR-CON 10) tablet 40 mEq  40 mEq Oral BID    potassium, sodium phosphates (NEUTRA-PHOS) packet 1 Packet  1 Packet Oral QID    pantoprazole (PROTONIX) tablet 40 mg  40 mg Oral ACB    sodium chloride (NS) flush 5-10 mL  5-10 mL IntraVENous Q8H    sodium chloride (NS) flush 5-10 mL  5-10 mL IntraVENous PRN    HYDROcodone-acetaminophen (NORCO)  mg tablet 1 Tab  1 Tab Oral Q4H PRN    rivaroxaban (XARELTO) tablet 20 mg  20 mg Oral DAILY    oxybutynin chloride XL (DITROPAN XL) tablet 5 mg  5 mg Oral DAILY    buPROPion SR (WELLBUTRIN SR) tablet 150 mg  150 mg Oral BID       Care Plan discussed with: Nurse    Total time spent with patient: 30 minutes.     Isabel Rogers MD

## 2017-04-29 NOTE — PROGRESS NOTES
TRANSFER - OUT REPORT:    Verbal report given to Oscar(name) on Gracie Smith  being transferred to (unit) for routine progression of care       Report consisted of patients Situation, Background, Assessment and   Recommendations(SBAR). Information from the following report(s) SBAR, Kardex, Procedure Summary and Recent Results was reviewed with the receiving nurse. Lines:   Peripheral IV 04/27/17 Left Arm (Active)   Site Assessment Clean, dry, & intact; Clean 4/29/2017  8:40 AM   Phlebitis Assessment 0 4/29/2017  8:40 AM   Infiltration Assessment 0 4/29/2017  8:40 AM   Dressing Status Clean, dry, & intact 4/29/2017  8:40 AM   Dressing Type Transparent 4/29/2017  8:40 AM   Hub Color/Line Status Blue 4/29/2017  8:40 AM   Action Taken Open ports on tubing capped 4/29/2017  8:40 AM   Alcohol Cap Used Yes 4/29/2017  8:40 AM        Opportunity for questions and clarification was provided.       Patient transported with:   Registered Nurse

## 2017-04-30 NOTE — PROGRESS NOTES
Notified Dr. Pierre Nevarez that pt c/o nausea. Order received for zofran 4mg Q6H prn. Also, asked if ortho could be consulted to evaluate for staple removal.  Consult received for ortho to be called on Monday.

## 2017-04-30 NOTE — PROGRESS NOTES
Bedside shift change report given to Jaiden (oncoming nurse) by Naheed Lincoln (offgoing nurse). Report included the following information SBAR and Kardex.

## 2017-04-30 NOTE — PROGRESS NOTES
Hospitalist Progress Note         Chantal Tolliver MD     Call physician on-call through the  7pm-7am    Daily Progress Note: 4/30/2017    Admission summary and hospital course   Britney Gomez is a 61 y.o. white female with past medical history of lung cancer, s/p repair of pathologic femur fracture, depression, intention tremor, stress incontinence presented with reported altered mental status and chief complaint of dizziness. Patient is a limited historian. Assessment/Plan:     1. Hypercalcemia  - IV fluid hydration with 0.9% NS  -  Calcitonin but patient refusing   - Received Zoldrenic acid  - nephrology on board   Secondary to malignancy , PTH suppressed   Normal calcium on 4/27 , much improved today , nephrology signed off   Will give some lab holidays      2. Altered mental status, confusion and paranoid , metabolic encephalopathy so far   No improvement even if calcium improved   Day 2   MRI with no mets in 12/16 and also repeat on 4/27   Patient improving , not paranoid on 4/27 but still word finding difficulty   Hoping to discharge soon by am .       3. Hypokalemia and hypomagnesemia  : replace iv andpo      4. Hypo, Phosphatemia   - replace      5. Anemia- mild  - Check stool occult blood test  - Iron profile/ B12 / folate level     6. Depression  lexapro and well butrin      7. 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 outpatient radiation possible per oncology     8 Chronic DVT : between feb and April   On xarelto        VTE prophylaxis: SCD/lovenox  Discussed plan of care with Patient/ and Nurse   Pre-admission lived at   Discharge planning:PT/OT          Subjective:   Some nausea today .  Otherwise is more clear mentally     Review of Systems:   A comprehensive review of systems was negative except mentioned in A/P    Objective:   Physical Exam:     Visit Vitals    /74 (BP 1 Location: Right arm, BP Patient Position: At rest)  Pulse 68    Temp 97.5 °F (36.4 °C)    Resp 16    Ht 5' 3\" (1.6 m)    Wt 79.9 kg (176 lb 2.4 oz)    SpO2 96%    Breastfeeding No    BMI 32.22 kg/m2      O2 Device: Room air    Temp (24hrs), Av.1 °F (36.7 °C), Min:97.5 °F (36.4 °C), Max:98.9 °F (37.2 °C)    701 - 1900  In: 360 [P.O.:360]  Out: 650 [Urine:650]   1901 -  07  In: 480 [P.O.:480]  Out: -     General:   confused    Lungs:   Clear to auscultation bilaterally. Chest wall:  No tenderness or deformity. Heart:  Regular rate and rhythm, S1, S2 normal, no murmur, click, rub or gallop. Abdomen:   Soft, non-tender. Bowel sounds normal. No masses,  No organomegaly. Extremities: Extremities normal, atraumatic, no cyanosis or edema. Pulses: 2+ and symmetric all extremities. Skin: Skin color, texture, turgor normal. No rashes or lesions   Neurologic: CNII-XII intact. Data Review:       Recent Days:  Recent Labs      17   0527   WBC  12.2*   HGB  10.2*   HCT  31.8*   PLT  232     Recent Labs      17   0405  17   0234   NA  134*  134*   K  3.7  3.7   CL  100  101   CO2  24  24   GLU  101*  99   BUN  7  6   CREA  0.73  0.65   CA  8.5  8.7   MG   --   1.5*     No results for input(s): PH, PCO2, PO2, HCO3, FIO2 in the last 72 hours. 24 Hour Results:  Recent Results (from the past 24 hour(s))   CBC WITH AUTOMATED DIFF    Collection Time: 17  5:27 AM   Result Value Ref Range    WBC 12.2 (H) 3.6 - 11.0 K/uL    RBC 3.27 (L) 3.80 - 5.20 M/uL    HGB 10.2 (L) 11.5 - 16.0 g/dL    HCT 31.8 (L) 35.0 - 47.0 %    MCV 97.2 80.0 - 99.0 FL    MCH 31.2 26.0 - 34.0 PG    MCHC 32.1 30.0 - 36.5 g/dL    RDW 15.5 (H) 11.5 - 14.5 %    PLATELET 721 379 - 614 K/uL    NEUTROPHILS 72 32 - 75 %    LYMPHOCYTES 9 (L) 12 - 49 %    MONOCYTES 16 (H) 5 - 13 %    EOSINOPHILS 3 0 - 7 %    BASOPHILS 0 0 - 1 %    ABS. NEUTROPHILS 8.8 (H) 1.8 - 8.0 K/UL    ABS. LYMPHOCYTES 1.0 0.8 - 3.5 K/UL    ABS.  MONOCYTES 1.9 (H) 0.0 - 1.0 K/UL    ABS. EOSINOPHILS 0.4 0.0 - 0.4 K/UL    ABS. BASOPHILS 0.0 0.0 - 0.1 K/UL       Problem List:  Problem List as of 4/30/2017  Date Reviewed: 4/24/2017          Codes Class Noted - Resolved    * (Principal)Hypercalcemia ICD-10-CM: C05.97  ICD-9-CM: 275.42  4/24/2017 - Present        Altered mental status ICD-10-CM: R41.82  ICD-9-CM: 780.97  4/24/2017 - Present        Femur fracture, right ([facility] ICD-10-CM: S72.91XA  ICD-9-CM: 821.00  4/4/2017 - Present        Malignant neoplasm of right lung ([facility] ICD-10-CM: C34.91  ICD-9-CM: 162.9  3/21/2017 - Present        S/P lobectomy of lung ICD-10-CM: Z90.2  ICD-9-CM: V45.89  11/23/2016 - Present    Overview Signed 11/23/2016  4:08 PM by Leroy Knock, MD Dr. Merline Spires, 11/17/16. Right lower lobe - mass             Hyperlipidemia ICD-10-CM: E78.5  ICD-9-CM: 272.4  11/12/2014 - Present        Colon cancer screening (Chronic) ICD-10-CM: Z12.11  ICD-9-CM: V76.51  1/4/2013 - Present    Overview Signed 1/4/2013 11:51 AM by MD Dr. King Matamoros, 1/4/2013. polyp             Basal cell cancer ICD-10-CM: C44.91  ICD-9-CM: 173.91  3/27/2012 - Present        Pap smear for cervical cancer screening (Chronic) ICD-10-CM: Z12.4  ICD-9-CM: V76.2  3/27/2012 - Present    Overview Addendum 3/9/2016  9:16 AM by MD Dr. Carole Matamoros             History of screening mammography (Chronic) ICD-10-CM: Z92.89  ICD-9-CM: V15.89  3/27/2012 - Present    Overview Addendum 11/12/2014 10:11 AM by Blanco Murillo MD     8/14- biopsy done after.              Depression ICD-10-CM: F32.9  ICD-9-CM: 363  Unknown - Present    Overview Signed 11/11/2013 10:49 AM by MD Dr. Celestina Matamoros incontinence ICD-10-CM: N39.3  ICD-9-CM: [id number]  Unknown - Present        RESOLVED: Other and unspecified hyperlipidemia ICD-10-CM: E78.5  ICD-9-CM: 272.4  9/17/2009 - 3/27/2012        RESOLVED: Tobacco abuse ICD-10-CM: Z72.0  ICD-9-CM: 305.1  Unknown - 3/27/2012              Medications reviewed  Current Facility-Administered Medications   Medication Dose Route Frequency    ondansetron (ZOFRAN) injection 4 mg  4 mg IntraVENous Q6H PRN    cyclobenzaprine (FLEXERIL) tablet 5 mg  5 mg Oral TID PRN    potassium chloride SR (KLOR-CON 10) tablet 40 mEq  40 mEq Oral BID    potassium, sodium phosphates (NEUTRA-PHOS) packet 1 Packet  1 Packet Oral QID    pantoprazole (PROTONIX) tablet 40 mg  40 mg Oral ACB    sodium chloride (NS) flush 5-10 mL  5-10 mL IntraVENous Q8H    sodium chloride (NS) flush 5-10 mL  5-10 mL IntraVENous PRN    HYDROcodone-acetaminophen (NORCO)  mg tablet 1 Tab  1 Tab Oral Q4H PRN    rivaroxaban (XARELTO) tablet 20 mg  20 mg Oral DAILY    oxybutynin chloride XL (DITROPAN XL) tablet 5 mg  5 mg Oral DAILY    buPROPion SR (WELLBUTRIN SR) tablet 150 mg  150 mg Oral BID       Care Plan discussed with: Nurse    Total time spent with patient: 30 minutes.     Charu Sadler MD

## 2017-05-01 NOTE — PROGRESS NOTES
Bedside and Verbal shift change report given to Em Michael (oncoming nurse) by Amilcar Oquendo (offgoing nurse). Report included the following information SBAR, Kardex, MAR and Recent Results. 1550 - Paged Morena KRUGER to clarify all staple removal sites plus suture removal site since there are 2 thigh areas with staples, 1 thigh area with sutures and 1 knee area with staples. 1630- Paged Morena Shelby again to clarify above. Waiting for call back.

## 2017-05-01 NOTE — PROGRESS NOTES
Per discussion with Dr Antoinette Christian on Friday , transferring patient to oncology service today

## 2017-05-01 NOTE — PROGRESS NOTES
Chief Complaint - follow-up and management of  Lung cancer  sssssssssssssssssssssssssssssssssssssssssssssssssssssssssssssssssssssssssssssssssssssssssssssssssssssssssssss   Exam  Gen Moderate distress; not confused; HEENT No mucositis; no thrush   Nodes No supraclavicular or cervical adenopathy   Chest / line sites No inflammation   Lungs Clear to auscultation   CV RRR   Abd Non-tender   Ext No edema on L; R leg swollen due to surgery   Skin No rashes   Neuro Awake and alert   Other    Time-based care: [] 25-35 min    [] > 35 mi     (Total time with >50% spent counseling/coordination)  Discussed with the following:  []     []  Nursing Staff  [] Consultant    []  Family   []  Other:  Active Medical Problems  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 There seems to be swelling, tenderness, and warmth in that area, out of proportion to her surgery and worse than when I saw her last week. I think she has a hematoma there, or far worse - more cancer. Will stop her Xarelto, start SCD's, and get a CT of the R femur today. If there is a hematoma, I'm sure it is primarily due to her full anticoagulation with Xarelto, and I think it likely the orthopedic surgeons would recommend conservative therapy. Will stop Xarelto for now and if there is a hematoma, continue with just SCD's for up to a week, then resume at 1/2 dose. If it is just soft tissue swelling from surgery, then we'll resume anticoagulation. Doubt abscess as she has no fever. Her mentation is much better than last week, even though I woke her from a dead sleep. She does feel demoralizd due to how sick she's been how long and limited her recovery has been.  I'm hoping that we can get her to progress enough to go to a more advanced rehab instead of just a SNF. XRT to start tomorrow. Depending on findings of CT, I may start her on fentanyl patch if it looks like she'll not be rid of her severe pain for quite a while. Current medications, progress notes, vital signs, radiology, and labs reviewed.     Trista Field MD

## 2017-05-01 NOTE — CONSULTS
ORTHO CONSULT NOTE    Subjective:     Date of Consultation:  May 1, 2017      Evelio Raymond is a 61 y.o. female who is being seen for right femur. She was diagnosed with Sarcomatoid Carcinoma of the lung. Had a pathological right femur fracture and underwent retrograde IM rodding of the the femur on 4/4/17 by Dr. Ritchie Burr of [address] [place] [address] at [facility]. She went to [place] for post operative therapy but while there developed hypercalcemia. She was brought to [address] [place] [address] for definitive care and will undergo RT in 2 days. She C/O right thigh pain and swelling distally. Has been walking short distances with her walker.  Is on Xarelto    Patient Active Problem List    Diagnosis Date Noted    Hypercalcemia 04/24/2017    Altered mental status 04/24/2017    Femur fracture, right ([address] 04/04/2017    Malignant neoplasm of right lung ([facility] 03/21/2017    S/P lobectomy of lung 11/23/2016    Hyperlipidemia 11/12/2014    Colon cancer screening 01/04/2013    Basal cell cancer 03/27/2012    Pap smear for cervical cancer screening 03/27/2012    History of screening mammography 03/27/2012    Depression     Stress incontinence      Family History   Problem Relation Age of Onset    Ovarian Cancer Mother     Elevated Lipids Mother     Clotting Disorder Mother     Cancer Mother      ovarian    Colon Cancer Father 48    Cancer Father 48     colon      Social History   Substance Use Topics    Smoking status: Former Smoker     Packs/day: 1.00     Years: 31.00     Quit date: 12/1/2009    Smokeless tobacco: Never Used    Alcohol use No     Past Medical History:   Diagnosis Date    Cancer ([facility]     BCC, lung cancer    Depression     History of screening mammography 3/27/2012    Ill-defined condition     fx femur    Intention tremor     Pap smear for cervical cancer screening 3/27/2012    Stress incontinence     Tobacco abuse       Past Surgical History:   Procedure Laterality Date    ENDOSCOPY, COLON, DIAGNOSTIC  2006  HX BREAST BIOPSY Left 09/23/2014    HX ORTHOPAEDIC Right 04/05/2017    biopsy of lesion    MO COLSC FLX W/REMOVAL LESION BY HOT BX FORCEPS  1/4/2013           Prior to Admission medications    Medication Sig Start Date End Date Taking? Authorizing Provider   cetirizine (ZYRTEC) 10 mg tablet Take 10 mg by mouth daily. Yes Historical Provider   rivaroxaban (XARELTO) 15 mg tab tablet Take 15 mg by mouth daily (with breakfast). Yes Historical Provider   bisacodyl (DULCOLAX) 10 mg suppository Insert 10 mg into rectum daily as needed for Other (constipation). Yes Historical Provider   cyclobenzaprine (FLEXERIL) 10 mg tablet Take 1 Tab by mouth nightly. 4/10/17  Yes Azucena Angulo MD   famotidine (PEPCID) 20 mg tablet Take 1 Tab by mouth two (2) times a day. 4/10/17  Yes Azucena Angulo MD   HYDROcodone-acetaminophen (NORCO)  mg tablet Take 1 Tab by mouth every four (4) hours as needed. Max Daily Amount: 6 Tabs. 4/10/17  Yes Azucena Angulo MD   polyethylene glycol (MIRALAX) 17 gram packet Take 1 Packet by mouth daily. 4/10/17  Yes Azucena Angulo MD   escitalopram oxalate (LEXAPRO) 10 mg tablet Take 10 mg by mouth daily. 9/28/16  Yes Historical Provider   simvastatin (ZOCOR) 40 mg tablet TAKE 1 TAB BY MOUTH NIGHTLY. 9/22/16  Yes Osmany Jimenes MD   buPROPion XL (WELLBUTRIN XL) 300 mg XL tablet Take 1 Tab by mouth every morning. 11/18/13  Yes Osmany Jimenes MD   solifenacin (VESICARE) 5 mg tablet Take 5 mg by mouth daily.    Yes Historical Provider     Current Facility-Administered Medications   Medication Dose Route Frequency    ondansetron (ZOFRAN) injection 4 mg  4 mg IntraVENous Q6H PRN    HYDROmorphone (PF) (DILAUDID) injection 0.5 mg  0.5 mg IntraVENous Q6H PRN    cyclobenzaprine (FLEXERIL) tablet 5 mg  5 mg Oral TID PRN    potassium chloride SR (KLOR-CON 10) tablet 40 mEq  40 mEq Oral BID    potassium, sodium phosphates (NEUTRA-PHOS) packet 1 Packet  1 Packet Oral QID    pantoprazole (PROTONIX) tablet 40 mg  40 mg Oral ACB  sodium chloride (NS) flush 5-10 mL  5-10 mL IntraVENous Q8H    sodium chloride (NS) flush 5-10 mL  5-10 mL IntraVENous PRN    HYDROcodone-acetaminophen (NORCO)  mg tablet 1 Tab  1 Tab Oral Q4H PRN    rivaroxaban (XARELTO) tablet 20 mg  20 mg Oral DAILY    oxybutynin chloride XL (DITROPAN XL) tablet 5 mg  5 mg Oral DAILY    buPROPion SR (WELLBUTRIN SR) tablet 150 mg  150 mg Oral BID      Allergies   Allergen Reactions    Tetracycline Hives     Palms and soles    Lortab [Hydrocodone-Acetaminophen] Itching        Review of Systems:  A comprehensive review of systems was negative except for that written in the HPI. Mental Status: alert and oriented    Objective:     Patient Vitals for the past 8 hrs:   BP Temp Pulse Resp SpO2   17 0913 135/69 98.9 °F (37.2 °C) 97 18 97 %     Temp (24hrs), Av.5 °F (36.9 °C), Min:98.2 °F (36.8 °C), Max:98.9 °F (37.2 °C)      EXAM: fatigued, cooperative, mild distress, appears stated age,   Neuro: no focal deficits. Extremities: staples intact right anterior proximal thigh, distal lateral thigh and at knee. Swelling- hematoma, no signs of infection, or wound dehiscence. Compartments soft, calf soft     Capillary refill <2 secs in right foot, Sensation intact in right leg    Imaging Review: Reviewed intra op fluoro imaging from 17    Labs: No results found for this or any previous visit (from the past 24 hour(s)).       Impression:     Patient Active Problem List    Diagnosis Date Noted    Hypercalcemia 2017    Altered mental status 2017    Femur fracture, right ([address] 2017    Malignant neoplasm of right lung ([facility] 2017    S/P lobectomy of lung 2016    Hyperlipidemia 2014    Colon cancer screening 2013    Basal cell cancer 2012    Pap smear for cervical cancer screening 2012    History of screening mammography 2012    Depression     Stress incontinence      Principal Problem: Hypercalcemia (4/24/2017)    Active Problems:    Altered mental status (4/24/2017)        Plan:   Pt.stable  Toe touch weight bearing with RLE. PT/OT consults pending  Analgesics  [place] to come out this Wednesday 5/3/17, cover incisions with steri-strips  F/u with Dr. Kina Samuels within 2-3 weeks  Dr. Kina Samuels aware and agrees with above plan.       SASKIA Swan

## 2017-05-01 NOTE — PROGRESS NOTES
Spiritual Care Partner Volunteer visited patient in [address] on 5/1/17. Documented by:  Mckinley Mcmanus M.Div.    Paging Service [phone])

## 2017-05-01 NOTE — PROGRESS NOTES
Problem: Self Care Deficits Care Plan (Adult)  Goal: *Acute Goals and Plan of Care (Insert Text)  Occupational Therapy Goals  Initiated 4/25/2017     1. Patient will perform lower body dressing using AE at supervision/set-up level within 7 days. 2. Patient will perform toilet transfers at supervision/set-up level maintaining TTWB on the RW using Walkers, Type: RW within 7 days. 3. Patient will perform all aspects of toileting at supervision/set-up level within 7 days. 4. Patient will demonstrate TTWB precautions without cues within 7 days. MET 5/1/17  5. Patient will participate in [facility] for cognitive assessment within 7 days. MET   6. Patient will complete UE AROM HEP using theraband with supervision within 7 days. OCCUPATIONAL THERAPY TREATMENT  Patient: Charity Coyne (60 y.o. female)  Date: 5/1/2017  Diagnosis: Altered mental status  Hypercalcemia Hypercalcemia       Precautions: TTWB  Chart, occupational therapy assessment, plan of care, and goals were reviewed. ASSESSMENT:  Cleared by nurse. Received in bed. Participated in bed mobility, bathing, dressing, grooming and toileting on BSC with RW and set up to min assist. Patient has a hip kit and using reacher, but states she does not know how to use sock aide. Instruction and demonstration given. Patient demonstrates understanding. Transferred to chair with RW and CGA. Left up in chair with LEs elevated in recliner, ice on right knee (had ice packs on on therapist's arrival), and set up for lunch. Nurse notified of session. Meeting goals. Performance impacted by impaired strength, endurance, and mobility. Recommend rehab.   Recommend next session: toileting in bathroom; standing endurance for functional activity  Recommend patient OOB 3 times a day and participation in self care with assist.     Progression toward goals:  [X]          Improving appropriately and progressing toward goals  [ ]          Improving slowly and progressing toward goals  [ ]          Not making progress toward goals and plan of care will be adjusted       PLAN:  Patient continues to benefit from skilled intervention to address the above impairments. Continue treatment per established plan of care. Discharge Recommendations:  Rehab  Further Equipment Recommendations for Discharge: none       SUBJECTIVE:   Patient stated I just get tired fast.      OBJECTIVE DATA SUMMARY:   Cognitive/Behavioral Status:  Neurologic State: Alert  Orientation Level: Oriented to situation;Oriented to place;Oriented to person  Cognition: Appropriate decision making; Appropriate for age attention/concentration; Appropriate safety awareness; Follows commands  Perception: Appears intact  Perseveration: No perseveration noted  Safety/Judgement: Awareness of environment  Functional Mobility and Transfers for ADLs:              Bed Mobility:     Supine to Sit: Supervision;Assist x1  Sit to Supine: Supervision;Assist x1                   Transfers:  Sit to Stand: Contact guard assistance;Assist x1  Functional Transfers  Toilet Transfer : Contact guard assistance;Assist x1  Adaptive Equipment: Bedside commode;Walker (comment)     Balance:  Sitting: Intact  Sitting - Static: Good (unsupported)  Sitting - Dynamic: Good (unsupported)  Standing: Impaired  Standing - Static: Fair  Standing - Dynamic : Fair  ADL Intervention:        Grooming  Washing Face: Supervision/set-up  Washing Hands: Supervision/set-up     Upper Body Bathing  Bathing Assistance: Supervision/set-up  Position Performed: Seated edge of bed     Lower Body Bathing  Perineal  : Contact guard assistance  Position Performed: Standing  Adaptive Equipment: Walker  Lower Body : Minimum assistance (right foot)  Position Performed: Long sitting on bed  Cues: Physical assistance     Upper Body [address] Gown: Supervision/ set-up     Lower Body Dressing Assistance  Underpants: Supervision/set-up (long sit and supine to rolll)  Socks: Supervision/set-up (after instruction on use of sock aide)  Leg Crossed Method Used: No  Position Performed: Bending forward method; Long sitting on bed;Seated edge of bed     Toileting  Bladder Hygiene: Supervision/set-up  Bowel Hygiene: Contact guard assistance  Clothing Management: Minimum assistance  Adaptive Equipment: Walker     Cognitive Retraining  Safety/Judgement: Awareness of environment        Activity Tolerance:    fair  Please refer to the flowsheet for vital signs taken during this treatment.   After treatment:   [X]  Patient left in no apparent distress sitting up in chair  [ ]  Patient left in no apparent distress in bed  [X]  Call bell left within reach  [X]  Nursing notified  [ ]  Caregiver present  [ ]  Bed alarm activated      COMMUNICATION/COLLABORATION:   The patients plan of care was discussed with: Registered Nurse     Alphonsa Goldberg, COTA  Time Calculation: 32 mins

## 2017-05-01 NOTE — PROGRESS NOTES
Hospitalist Progress Note         Mahin Rowe MD     Call physician on-call through the  7pm-7am    Daily Progress Note: 5/1/2017    Admission summary and hospital course   Theresa Hernandez is a 61 y.o. white female with past medical history of lung cancer, s/p repair of pathologic femur fracture, depression, intention tremor, stress incontinence presented with reported altered mental status and chief complaint of dizziness. Patient is a limited historian. Assessment/Plan:     1. Hypercalcemia  - IV fluid hydration with 0.9% NS  -  Calcitonin but patient refusing   - Received Zoldrenic acid  - nephrology on board   Secondary to malignancy , PTH suppressed   Normal calcium on 4/27 , much improved today , nephrology signed off   Will give some lab holidays   Bmp in am      2. Altered mental status, confusion and paranoid , metabolic encephalopathy so far   No improvement even if calcium improved   Day 2   MRI with no mets in 12/16 and also repeat on 4/27   Patient improving , not paranoid on 4/27 but still word finding difficulty       3. Hypokalemia and hypomagnesemia  : replace iv and po      4. Hypo, Phosphatemia   - replace      5. Anemia- mild  - Check stool occult blood test  - Iron profile/ B12 / folate level     6. Depression  lexapro and well butrin      7. 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 Inpatient radiation  per oncology starting this week     8 Chronic DVT : between feb and April   On xarelto      9 Femur fracture from mets :   Staples coming out today   Has swelling    VTE prophylaxis: SCD/lovenox  Discussed plan of care with Patient/ and Nurse   Pre-admission lived at   Discharge planning:PT/OT          Subjective:   Some nausea today .  Otherwise is more clear mentally   Pain not controlled     Review of Systems:   A comprehensive review of systems was negative except mentioned in A/P    Objective:   Physical Exam: Visit Vitals    /69 (BP 1 Location: Right arm, BP Patient Position: At rest)    Pulse 97    Temp 98.9 °F (37.2 °C)    Resp 18    Ht 5' 3\" (1.6 m)    Wt 77.7 kg (171 lb 4.8 oz)    SpO2 97%    Breastfeeding No    BMI 31.33 kg/m2      O2 Device: Room air    Temp (24hrs), Av.5 °F (36.9 °C), Min:98.2 °F (36.8 °C), Max:98.9 °F (37.2 °C)        1901 -  0700  In: 840 [P.O.:840]  Out: 650 [Urine:650]    General:   confused    Lungs:   Clear to auscultation bilaterally. Chest wall:  No tenderness or deformity. Heart:  Regular rate and rhythm, S1, S2 normal, no murmur, click, rub or gallop. Abdomen:   Soft, non-tender. Bowel sounds normal. No masses,  No organomegaly. Extremities: Extremities normal, atraumatic, no cyanosis or edema. Pulses: 2+ and symmetric all extremities. Skin: Skin color, texture, turgor normal. No rashes or lesions   Neurologic: CNII-XII intact. Data Review:       Recent Days:  Recent Labs      17   0527   WBC  12.2*   HGB  10.2*   HCT  31.8*   PLT  232     Recent Labs      17   0405   NA  134*   K  3.7   CL  100   CO2  24   GLU  101*   BUN  7   CREA  0.73   CA  8.5     No results for input(s): PH, PCO2, PO2, HCO3, FIO2 in the last 72 hours. 24 Hour Results:  No results found for this or any previous visit (from the past 24 hour(s)).     Problem List:  Problem List as of 2017  Date Reviewed: 2017          Codes Class Noted - Resolved    * (Principal)Hypercalcemia ICD-10-CM: A67.08  ICD-9-CM: 275.42  2017 - Present        Altered mental status ICD-10-CM: R41.82  ICD-9-CM: 780.97  2017 - Present        Femur fracture, right ([address] ICD-10-CM: S72.91XA  ICD-9-CM: 821.00  2017 - Present        Malignant neoplasm of right lung ([facility] ICD-10-CM: C34.91  ICD-9-CM: 162.9  3/21/2017 - Present        S/P lobectomy of lung ICD-10-CM: Z90.2  ICD-9-CM: V45.89  2016 - Present    Overview Signed 2016  4:08 PM by Desi Bean MD Dr. Cesar Bronson, 11/17/16. Right lower lobe - mass             Hyperlipidemia ICD-10-CM: E78.5  ICD-9-CM: 272.4  11/12/2014 - Present        Colon cancer screening (Chronic) ICD-10-CM: Z12.11  ICD-9-CM: V76.51  1/4/2013 - Present    Overview Signed 1/4/2013 11:51 AM by MD Dr. Reddy Grajeda, 1/4/2013. polyp             Basal cell cancer ICD-10-CM: C44.91  ICD-9-CM: 173.91  3/27/2012 - Present        Pap smear for cervical cancer screening (Chronic) ICD-10-CM: Z12.4  ICD-9-CM: V76.2  3/27/2012 - Present    Overview Addendum 3/9/2016  9:16 AM by MD Dr. Rachel Grajeda             History of screening mammography (Chronic) ICD-10-CM: Z92.89  ICD-9-CM: V15.89  3/27/2012 - Present    Overview Addendum 11/12/2014 10:11 AM by Av Clancy MD     8/14- biopsy done after.              Depression ICD-10-CM: F32.9  ICD-9-CM: 601  Unknown - Present    Overview Signed 11/11/2013 10:49 AM by MD Dr. Aryan Grajeda Back incontinence ICD-10-CM: N39.3  ICD-9-CM: [id number]  Unknown - Present        RESOLVED: Other and unspecified hyperlipidemia ICD-10-CM: E78.5  ICD-9-CM: 272.4  9/17/2009 - 3/27/2012        RESOLVED: Tobacco abuse ICD-10-CM: Z72.0  ICD-9-CM: 305.1  Unknown - 3/27/2012              Medications reviewed  Current Facility-Administered Medications   Medication Dose Route Frequency    ondansetron (ZOFRAN) injection 4 mg  4 mg IntraVENous Q6H PRN    HYDROmorphone (PF) (DILAUDID) injection 0.5 mg  0.5 mg IntraVENous Q6H PRN    cyclobenzaprine (FLEXERIL) tablet 5 mg  5 mg Oral TID PRN    potassium chloride SR (KLOR-CON 10) tablet 40 mEq  40 mEq Oral BID    potassium, sodium phosphates (NEUTRA-PHOS) packet 1 Packet  1 Packet Oral QID    pantoprazole (PROTONIX) tablet 40 mg  40 mg Oral ACB    sodium chloride (NS) flush 5-10 mL  5-10 mL IntraVENous Q8H    sodium chloride (NS) flush 5-10 mL  5-10 mL IntraVENous PRN    HYDROcodone-acetaminophen (NORCO)  mg tablet 1 Tab  1 Tab Oral Q4H PRN    rivaroxaban (XARELTO) tablet 20 mg  20 mg Oral DAILY    oxybutynin chloride XL (DITROPAN XL) tablet 5 mg  5 mg Oral DAILY    buPROPion SR (WELLBUTRIN SR) tablet 150 mg  150 mg Oral BID       Care Plan discussed with: Nurse    Total time spent with patient: 30 minutes.     Marianela Perez MD

## 2017-05-01 NOTE — PROGRESS NOTES
Problem: Mobility Impaired (Adult and Pediatric)  Goal: *Acute Goals and Plan of Care (Insert Text)  Physical Therapy Goals  Weekly re-assessment 5/1/2017  1. Patient will move from supine to sit and sit to supine and roll side to side in bed with modified independent within 7 day(s). 2. Patient will transfer from bed to chair and chair to bed with supervsion assist using the least restrictive device within 7 day(s). 4. Patient will ambulate with supervision assist for 25 feet with the least restrictive device within 7 day(s). 5. Patient will improve Tinetti score 4+ points, decreasing fall risk, within 7 day(s). Initiated 4/24/2017  1. Patient will move from supine to sit and sit to supine and roll side to side in bed with minimal assistance/contact guard assist within 7 day(s). MET  2. Patient will transfer from bed to chair and chair to bed with minimal assistance/contact guard assist using the least restrictive device within 7 day(s). MET  3. Patient will perform sit to stand with supervision/set-up within 7 day(s). MET  4. Patient will ambulate with minimal assistance/contact guard assist for 25 feet with the least restrictive device within 7 day(s). 5. Patient will improve Tinetti score 4+ points, decreasing fall risk, within 7 day(s). PHYSICAL THERAPY TREATMENT: WEEKLY REASSESSMENT  Patient: Melissa Wong (89 y.o. female)  Date: 5/1/2017  Diagnosis: Altered mental status  Hypercalcemia Hypercalcemia       Precautions: TTWB      ASSESSMENT:  Patient received in bed and agreeable to therapy but limited due to fatigue and pain. Educated on exercises (ankle pumps, quad, ham and glut isometrics, assisted SLR, long arc quads). Pt with significant pain today and worked earlier with OT so limited mobility. Ambulated well with rolling walker with SBA and barely TTWB on right. Returned to bed. Encouraged her to be up in chair more and recommend up for all meals.   Will attempt to arrange next session around pain medication. Patient's progression toward goals since last assessment: progressing and goals revised. PLAN:  Goals have been updated based on progression since last assessment. Patient continues to benefit from skilled intervention to address the above impairments. Continue to follow the patient 5 times a week to address goals. Planned Interventions:  [X]              Bed Mobility Training             [ ]       Neuromuscular Re-Education  [X]              Transfer Training                   [ ]       Orthotic/Prosthetic Training  [X]              Gait Training                         [ ]       Modalities  [X]              Therapeutic Exercises           [ ]       Edema Management/Control  [X]              Therapeutic Activities            [X]       Patient and Family Training/Education  [ ]              Other (comment):  Discharge Recommendations: Skilled Nursing Facility  Further Equipment Recommendations for Discharge: none       SUBJECTIVE:   Patient stated I can only do a little because I worked hard with OT.      OBJECTIVE DATA SUMMARY:   Critical Behavior:  Neurologic State: Alert  Orientation Level: Oriented to situation, Oriented to place, Oriented to person  Cognition: Appropriate decision making, Appropriate for age attention/concentration, Appropriate safety awareness, Follows commands  Safety/Judgement: Awareness of environment     Strength:                           Functional Mobility Training:  Bed Mobility:     Supine to Sit: Supervision  Sit to Supine: Supervision           Transfers:  Sit to Stand: Supervision  Stand to Sit: Supervision        Bed to Chair: Contact guard assistance;Assist x1                    Balance:  Sitting: Intact  Sitting - Static: Good (unsupported)  Sitting - Dynamic: Good (unsupported)  Standing: Impaired; With support  Standing - Static: Good  Standing - Dynamic : Fair  Ambulation/Gait Training:  Distance (ft): 15 Feet (ft)  Assistive Device: Gait belt;Walker, rolling  Ambulation - Level of Assistance: Stand-by asssistance           Right Side Weight Bearing: Toe touch           Speed/Epnny: Slow                                     Therapeutic Exercises:    Ankle pumps, quad and ham isometrics, assisted SLR  Pain:  Pain Scale 1: Numeric (0 - 10)  Pain Intensity 1: 7  Pain Location 1: Leg  Pain Orientation 1: Right  Pain Description 1: Stabbing  Pain Intervention(s) 1: Medication (see MAR)  After treatment:   [ ]  Patient left in no apparent distress sitting up in chair  [X]  Patient left in no apparent distress in bed  [X]  Call bell left within reach  [X]  Nursing notified  [ ]  Caregiver present  [ ]  Bed alarm activated      COMMUNICATION/COLLABORATION:   The patients plan of care was discussed with: Registered Nurse     Maria Eugenia Cates, PT   Time Calculation: 15 mins

## 2017-05-01 NOTE — PROGRESS NOTES
Bedside shift change report given to Kat Gutierrez (oncoming nurse) by Miri Barclay RN (offgoing nurse). Report included the following information SBAR and Kardex.

## 2017-05-01 NOTE — TELEPHONE ENCOUNTER
----- Message from Lesly Rothman sent at 4/29/2017  9:24 AM EDT -----  Regarding: Dr. Kimble/ telephone  Pt wanted to inform the doctor that she has been checked into the hospital as of last week. Best contact number [phone].

## 2017-05-02 NOTE — ADT AUTH CERT NOTES
Utilization Review           Systemic or Infectious Condition GRG - Care Day 8 (5/1/2017) by Gaby Calhoun RN        Review Status Review Entered       Completed 5/1/2017       Details              Care Day: 8 Care Date: 5/1/2017 Level of Care: Telemetry       Guideline Day 3        Level Of Care       (X) * Activity level acceptable              Clinical Status       (X) * Hemodynamic stability       (X) * Respiratory status acceptable       ( ) * Neurologic status acceptable       (X) * Temperature status acceptable       ( ) * No infection, or status acceptable       (X) * No neutropenia, or status acceptable       ( ) * Special infection or injury situations absent       ( ) * Electrolyte status acceptable       ( ) * General Discharge Criteria met              Interventions       (X) * Intake acceptable       ( ) * No inpatient interventions needed                                   * Milestone              Additional Notes       Active Medical Problems       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 There seems to be swelling, tenderness, and warmth in that area, out of proportion to her surgery and worse than when I saw her last week. I think she has a hematoma there, or far worse - more cancer.       Will stop her Xarelto, start SCD's, and get a CT of the R femur today. If there is a hematoma, I'm sure it is primarily due to her full anticoagulation with Xarelto, and I think it likely the orthopedic surgeons would recommend conservative therapy. Will stop Xarelto for now and if there is a hematoma, continue with just SCD's for up to a week, then resume at 1/2 dose.  If it is just soft tissue swelling from surgery, then we'll resume anticoagulation. Doubt abscess as she has no fever.                Her mentation is much better than last week, even though I woke her from a dead sleep. She does feel demoralizd due to how sick she's been how long and limited her recovery has been. I'm hoping that we can get her to progress enough to go to a more advanced rehab instead of just a SNF.               XRT to start tomorrow. Depending on findings of CT, I may start her on fentanyl patch if it looks like she'll not be rid of her severe pain for quite a while.       T 98.1 P 102 RR 18 /79 spO2 97%       Zofran IV, Dilaudid IV x2       Other orders: lower rt extremity CT, regular diet, SCDs, neuro assessments q2h, daily weight, ambulate with assistance           Systemic or Infectious Condition GRG - Care Day 7 (4/30/2017) by Vira Car RN        Review Status Review Entered       Completed 5/1/2017       Details              Care Day: 7 Care Date: 4/30/2017 Level of Care: Telemetry       Guideline Day 3        Level Of Care       (X) * Activity level acceptable              Clinical Status       (X) * Hemodynamic stability       (X) * Respiratory status acceptable       ( ) * Neurologic status acceptable       (X) * Temperature status acceptable       ( ) * No infection, or status acceptable       (X) * No neutropenia, or status acceptable       ( ) * Special infection or injury situations absent       ( ) * Electrolyte status acceptable       ( ) * General Discharge Criteria met              Interventions       (X) * Intake acceptable       ( ) * No inpatient interventions needed                                   * Milestone              Additional Notes       1.  Hypercalcemia       - IV fluid hydration with 0.9% NS       - Calcitonin but patient refusing        - Received Zoldrenic acid       - nephrology on board        Secondary to malignancy , PTH suppressed        Normal calcium on 4/27 , much improved today , nephrology signed off        Will give some lab holidays                 2. Altered mental status, confusion and paranoid , metabolic encephalopathy so far        No improvement even if calcium improved        Day 2        MRI with no mets in 12/16 and also repeat on 4/27        Patient improving , not paranoid on 4/27 but still word finding difficulty        Hoping to discharge soon by am .                        3. Hypokalemia and hypomagnesemia : replace iv andpo                4. Hypo, Phosphatemia        - replace                 5. Anemia- mild       - Check stool occult blood test       - Iron profile/ B12 / folate level                6. Depression       lexapro and well butrin                 7. Sarcomatoid carcinomoma of the lung and femur fracture from mets .       outpatient radiation possible per oncology                8 Chronic DVT : between feb and April        On xarelto                         VTE prophylaxis: SCD/lovenox       Discussed plan of care with Patient/ and Nurse        Pre-admission lived at        Discharge planning:PT/OT       T 97.5 P 68 RR 16 /74 spO2 96%       WBC 12.2, H/H 10.2/31. 8       Some nausea today .  Otherwise is more clear mentally        Norco PO x5, Dilaudid IV x2           Systemic or Infectious Condition GRG - Care Day 6 (4/29/2017) by Rikki Figueroa RN        Review Status Review Entered       Completed 5/1/2017       Details              Care Day: 6 Care Date: 4/29/2017 Level of Care: Telemetry       Guideline Day 3        Level Of Care       (X) * Activity level acceptable              Clinical Status       (X) * Hemodynamic stability       (X) * Respiratory status acceptable       ( ) * Neurologic status acceptable       (X) * Temperature status acceptable       ( ) * No infection, or status acceptable       (X) * No neutropenia, or status acceptable       ( ) * Special infection or injury situations absent       ( ) * Electrolyte status acceptable       ( ) * General Discharge Criteria met              Interventions       (X) * Intake acceptable       ( ) * No inpatient interventions needed                                   * Milestone              Additional Notes       1. Hypercalcemia       - IV fluid hydration with 0.9% NS       - Calcitonin but patient refusing        - Received Zoldrenic acid       - nephrology on board        Secondary to malignancy , PTH suppressed        Normal calcium on 4/27 , much improved today , nephrology signed off        Will give some lab holidays                 2. Altered mental status, confusion and paranoid , metabolic encephalopathy so far        No improvement even if calcium improved        Day 2        MRI with no mets in 12/16 and also repeat on 4/27        Patient improving , not paranoid on 4/27 but still word finding difficulty        Hoping to discharge soon by am .                        3. Hypokalemia and hypomagnesemia : replace iv andpo                4. Hypo, Phosphatemia        - replace                 5. Anemia- mild       - Check stool occult blood test       - Iron profile/ B12 / folate level                6. Depression       lexapro and well butrin                 7.  Sarcomatoid carcinomoma of the lung and femur fracture from mets .       outpatient radiation possible per oncology                8 Chronic DVT : between feb and April        On xarelto                         VTE prophylaxis: SCD/lovenox       Discussed plan of care with Patient/ and Nurse        Pre-admission lived at        Discharge planning:PT/OT       T 98.1 P 101 RR 18 /75 spO2 95%       Sodium 134, Glucose 101       Norco PO x5           Systemic or Infectious Condition GRG - Care Day 5 (4/28/2017) by Lakeshia Gomez RN        Review Status Review Entered       Completed 5/1/2017       Details              Care Day: 5 Care Date: 4/28/2017 Level of Care: Telemetry       Guideline Day 3        Level Of Care       (X) * Activity level acceptable              Clinical Status       (X) * Hemodynamic stability       (X) * Respiratory status acceptable       ( ) * Neurologic status acceptable       (X) * Temperature status acceptable       ( ) * No infection, or status acceptable       (X) * No neutropenia, or status acceptable       ( ) * Special infection or injury situations absent       ( ) * Electrolyte status acceptable       ( ) * General Discharge Criteria met              Interventions       (X) * Intake acceptable       ( ) * No inpatient interventions needed                                   * Milestone              Additional Notes       1. Hypercalcemia       - IV fluid hydration with 0.9% NS       - Calcitonin but patient refusing        - Received Zoldrenic acid       - nephrology on board        Secondary to malignancy , PTH suppressed        Normal calcium on 4/27 , much improved today , nephrology signed off                 2. Altered mental status, confusion and paranoid , metabolic encephalopathy so far        No improvement even if calcium improved        Day 2        MRI with no mets in 12/16 and also repeat on 4/27        Patient improving , not paranoid on 4/27 but still word finding difficulty        Hoping to discharge soon by am .                        3. Hypokalemia and hypomagnesemia : replace iv andpo                4. Hypo, Phosphatemia        - replace                 5. Anemia- mild       - Check stool occult blood test       - Iron profile/ B12 / folate level                6. Depression       lexapro and well butrin                 7.  Sarcomatoid carcinomoma of the lung and femur fracture from mets .       outpatient radiation possible per oncology                8 Chronic DVT : between feb and April        On xarelto                         VTE prophylaxis: SCD/lovenox       Discussed plan of care with Patient/ and Nurse        Pre-admission lived at        Discharge planning:PT/OT       She want to go , but Dr Mi Ip wants to treat her with radiation and probable chemo next week        Will transfer to his service Monday and he agrees       T 98.4 P 111 RR 20 /62 spO2 98%       Sodium 134, Magnesium 1.5       Mag sulfate IV, Norco PO x4           Systemic or Infectious Condition GRG - Care Day 4 (4/27/2017) by Enoc Chavez RN        Review Status Review Entered       Completed 4/28/2017       Details              Care Day: 4 Care Date: 4/27/2017 Level of Care: Telemetry       Guideline Day 3        Level Of Care       (X) * Activity level acceptable              Clinical Status       (X) * Hemodynamic stability       (X) * Respiratory status acceptable       ( ) * Neurologic status acceptable       (X) * Temperature status acceptable       ( ) * No infection, or status acceptable       (X) * No neutropenia, or status acceptable       ( ) * Special infection or injury situations absent       ( ) * Electrolyte status acceptable       ( ) * General Discharge Criteria met              Interventions       (X) * Intake acceptable       ( ) * No inpatient interventions needed              4/28/2017 1:10 PM EDT by Jorge Luis Chacon       Subject: Additional Clinical Information       pt without acute c/o at this time less confused only word finding difficulty today 122/70 93 98.1 18 92% ramag 1.5 phos 1.9 k 3.4 po meds po norco 1 tab q4h prn pain renal consultinghypercalcemia likely 2/2 malignancy PTH supressed as expected hypokalemia replete today  po klor con 40 meq x2 po nutraphos packet 4 x daily 1 gram iv mag sulfate ns @ 100 hematology consulting sarcomatoid carcinoma of the lung met to the right femur s/p repair may need XRT to thigh despite the IM domi in order to control pain prognosis is terrible though she is not fully aware of that FULL code reg diet PT OT consulting CM assisting with dcp pending Michele Los

## 2017-05-02 NOTE — PROGRESS NOTES
Bedside shift change report given to Davion Hardwick RN (oncoming nurse) by Kosta Rodarte RN (offgoing nurse). Report included the following information SBAR and MAR.

## 2017-05-02 NOTE — PROGRESS NOTES
Bedside shift change report given to [facility] (oncoming nurse) by Cody Hernandez (offgoing nurse). Report included the following information SBAR and Kardex.

## 2017-05-02 NOTE — PROGRESS NOTES
NUTRITION- DIETETIC tECHnICIAN    Pt seen for:       [x]                  Rescreen  []                  Food preferences/tolerances  []                  Food Allergies  []                  PO intake check  []                  Supplements  []                  Diet order clarification  []                  Education  []                  Other     Rescreen:    [x]                  Not at Nutrition Risk, rescreen per screening protocol  []                  At Nutrition Risk- RD referral         SUBJECTIVE/OBJECTIVE:     Information obtained from:  patient      Diet:  Regular    Intake: poor    Patient Vitals for the past 100 hrs:   % Diet Eaten   05/01/17 2015 0 %   04/30/17 1454 50 %   04/29/17 1347 50 %   04/29/17 0840 60 %       Weight Changes: Wt Readings from Last 3 Encounters:   05/02/17 77.4 kg (170 lb 10.2 oz)   04/25/17 78 kg (172 lb)   04/05/17 72 kg (158 lb 11.7 oz)       Problems Identified      [x]                  Patient complains of little appetite or desire to eat. Intake usually good pta and weight usually stable. Needs lots of encouragement to eat. Outlined many options available at meal time and hopefully patient will find alternatives she can tolerate. She may benefit from addition of a liquid nutritional supplement to help with kcal/protein intake until po is optimal again.    []                  Specified food preferences   []                  Dislikes supplements              []                  Allergies:   []                  Difficulty chewing      []                  Dentition    []                  Nausea/Vomiting   []                  Constipation   []                  Diarrhea    PLAN:     [x]                   Continue current diet and encourage intake  [x]                   Suggest adding Ensure Compact with all meals  []                   Dislikes supplements will try a substitution  []                   Modify diet for food allergies  []                   Adjust texture due to difficulty chewing   []                   Educated patient  []                   RD Referral  [x]                   Rescreen per screening protocol          Young Erickson DTR

## 2017-05-02 NOTE — PROGRESS NOTES
Chief Complaint - follow-up and management of  Lung cancer     Exam  Gen Moderate distress; not confused; HEENT No mucositis; no thrush   Nodes No supraclavicular or cervical adenopathy   Chest / line sites No inflammation   Lungs Clear to auscultation   CV RRR   Abd Non-tender   Ext No edema on L; R leg swollen due to surgery   Skin No rashes   Neuro Awake and alert   Other    Time-based care: [] 25-35 min    [] > 35 mi     (Total time with >50% spent counseling/coordination)  Discussed with the following:  []     []  Nursing Staff  [] Consultant    []  Family   []  Other:  Active Medical Problems  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 The distal femur mass and knee are hot, but she has no fever or severe leukocytosis. CT shows a heterogenous mass that could be inflammation or hemorrhage, tumor or infection. Will see what ortho wants to do. If they think it could be infected hematoma, they may want to explore. If they think it is all c/w post op inflammation we may sit tight. If they are not sure or think it could be tumor, we might do ulttrasound-guided FNA of the mass and aspirate of the knee joint. For now will control her pain and continue XRT. Continue SCD's and no anticoagulation for now. Await input from ortho. I reviewed the images from 4/4 when she was admitted and now. There was some evidence of swelling and blood at that time; possibly it was tumor then that has progressed now. .... Current medications, progress notes, vital signs, radiology, and labs reviewed.     Elvin Albright MD

## 2017-05-02 NOTE — PROGRESS NOTES
Patient reviewed in rounds. Patient is not ready for discharge at this time. CM faxed updated updated clinicals to [facility] on 5/1/17. Patient will need to obtain authorization before transitioning to nursing facility. CM will continue to follow and assist with disposition needs as they arise.     NITZA Rodriguez/JUAN ANTONIO  3:14 PM

## 2017-05-02 NOTE — PROGRESS NOTES
Occupational Therapy: Arrived at room as transport arrived to take patient to radiation. Will follow and see as able and appropriate.   MICKEY Kemp/NATIV[place]AD

## 2017-05-02 NOTE — PROGRESS NOTES
Problem: Pain  Goal: *Control of Pain  Outcome: Not Progressing Towards Goal  New orders obtained in attempt to better control pain

## 2017-05-02 NOTE — PROGRESS NOTES
Physical Therapy  Spoke with patient who states her pain is still uncontrolled. States radiation was difficult due to pain. Will defer treatment today but still encourage up to chair as tolerated for meals. Will follow up tomorrow.   Alber Contreras, PT

## 2017-05-02 NOTE — PROGRESS NOTES
Bedside shift change report given to [facility] (oncoming nurse) by William Douglas (offgoing nurse). Report included the following information SBAR and Kardex.

## 2017-05-03 NOTE — PROGRESS NOTES
05/03/17 1441   Vitals   Temp 98 °F (36.7 °C)   Temp Source Oral   Pulse (Heart Rate) (!) 114   Heart Rate Source Monitor   Resp Rate 18   O2 Sat (%) 96 %   Level of Consciousness Alert   /84   MAP (Calculated) 105   BP 1 Location Right arm   BP 1 Method Automatic   BP Patient Position At rest   MEWS Score 3

## 2017-05-03 NOTE — PROGRESS NOTES
Spoke with Andreas Perez, RN who spoke with Dr. Carl Goodson. MD stated that he would like the biopsy orders changed to only do the US guided biopsy/aspiration so the patient can have it done today. Spoke with Rona Hamilton in [address] who states that she will speak with the radiologist who is on right now and see if he is able to do the procedure before he leaves. Rona Hamilton states she will call me back and let me know. 1620: Spoke with Rona Hamilton who states that the radiologist who is on right now leaves at 1700, but he will do the procedure prior to leaving if we get the patient down ASAP. Rona Hamilton states she will come up to the floor now to help me transfer the patient down to US in the bed. Patient transferred to [address] [place] [address] in ED.

## 2017-05-03 NOTE — PROGRESS NOTES
Bedside shift change report given to Kirill Cai RN (oncoming nurse) by Sandy Buchanan RN (offgoing nurse). Report included the following information SBAR and MAR.     1453- MD paged to notify of results of MEWS score- Heart rate of 114. Waiting for page back.

## 2017-05-03 NOTE — PROGRESS NOTES
Day #1 of Zosyn  Indication:  possible bone / joint infection  Current regimen:  3.375g IV [id number]  ID Following ?: NO  Frequency of BMP?: daily  Recent Labs      17   0619   CREA  0.92   BUN  12     Est CrCl: >20 ml/min  Temp (24hrs), Av.2 °F (36.8 °C), Min:97.9 °F (36.6 °C), Max:98.7 °F (37.1 °C)    Cultures:   5/3:  joint aspirate and blood pending    Plan: Continue current regimen as the equivalent four hour infusion regimen (3.375g IV [id number])

## 2017-05-03 NOTE — PROGRESS NOTES
Physical Therapy  Attempted to see patient but still with pain and awaiting IR to for aspiration of RLE. Will follow up tomorrow.   Tammie Melton, PT

## 2017-05-03 NOTE — PROGRESS NOTES
Occupational Therapy: cleared by nurse. Patient received in bed with eyes closed. Startled awake at her name. Reports she has been \"doing that\" referring to startling easily since medication change yesterday. Reports feeing \"so tired\". Declines participation at this time and reports that they are aspirating her right knee in AM \"and then maybe I'll know something, good or bad\". Requests OT recheck with her tomorrow. Will follow. Weekly reassessment due at next visit.   PHYLLIS Finney

## 2017-05-03 NOTE — PROGRESS NOTES
Bedside and Verbal shift change report given to [facility], RN (oncoming nurse) by Tae Garcia RN (offgoing nurse). Report included the following information SBAR, Kardex, MAR and Recent Results.

## 2017-05-03 NOTE — CONSULTS
ORTHOPAEDIC CONSULT NOTE    Subjective:     Date of Consultation:  May 3, 2017  Referring Physician:  Hattie Sky is a 61 y.o. female with PMH significant for right lung cancer s/p partial lobectomy with subsequent PE as well as a pathologic fracture of the right femur for which she underwent a retrograde nail placement in early April by Dr Dianne Sutton at [facility]. She was doing well postoperatively and walking some with therapy. She presented to the ED with AMS and hypercalcemia and has been treated for such since her arrival. She started complaining of additional right leg pain and swelling over the past several days with limited ability to bear weight. She denies any drainage from her surgical incisions. She denies any fevers or chills. She is receiving radiation treatments to her right femur at this time. She underwent a repeat CT of the right lower extremity which shows a large enhancing soft tissue or fluid mass around the fracture site which is suspicious for extension of metastatic disease. We have been asked to see the patient regarding this.     Patient Active Problem List    Diagnosis Date Noted    Hypercalcemia 04/24/2017    Altered mental status 04/24/2017    Femur fracture, right ([address] 04/04/2017    Malignant neoplasm of right lung ([facility] [address] 03/21/2017    S/P lobectomy of lung 11/23/2016    Hyperlipidemia 11/12/2014    Colon cancer screening 01/04/2013    Basal cell cancer 03/27/2012    Pap smear for cervical cancer screening 03/27/2012    History of screening mammography 03/27/2012    Depression     Stress incontinence      Family History   Problem Relation Age of Onset    Ovarian Cancer Mother     Elevated Lipids Mother     Clotting Disorder Mother     Cancer Mother      ovarian    Colon Cancer Father 48    Cancer Father 48     colon      Social History   Substance Use Topics    Smoking status: Former Smoker     Packs/day: 1.00     Years: 31.00     Quit date: 12/1/2009    Smokeless tobacco: Never Used    Alcohol use No     Past Medical History:   Diagnosis Date    Cancer ([facility] [address]     BCC, lung cancer    Depression     History of screening mammography 3/27/2012    Ill-defined condition     fx femur    Intention tremor     Pap smear for cervical cancer screening 3/27/2012    Stress incontinence     Tobacco abuse       Past Surgical History:   Procedure Laterality Date    ENDOSCOPY, COLON, DIAGNOSTIC  2006    HX BREAST BIOPSY Left 09/23/2014    HX ORTHOPAEDIC Right 04/05/2017    biopsy of lesion    CT COLSC FLX W/REMOVAL LESION BY HOT BX FORCEPS  1/4/2013           Prior to Admission medications    Medication Sig Start Date End Date Taking? Authorizing Provider   cetirizine (ZYRTEC) 10 mg tablet Take 10 mg by mouth daily. Yes Historical Provider   rivaroxaban (XARELTO) 15 mg tab tablet Take 15 mg by mouth daily (with breakfast). Yes Historical Provider   bisacodyl (DULCOLAX) 10 mg suppository Insert 10 mg into rectum daily as needed for Other (constipation). Yes Historical Provider   cyclobenzaprine (FLEXERIL) 10 mg tablet Take 1 Tab by mouth nightly. 4/10/17  Yes Mehrdad Fine MD   famotidine (PEPCID) 20 mg tablet Take 1 Tab by mouth two (2) times a day. 4/10/17  Yes Mehrdad Fine MD   HYDROcodone-acetaminophen (NORCO)  mg tablet Take 1 Tab by mouth every four (4) hours as needed. Max Daily Amount: 6 Tabs. 4/10/17  Yes Mehrdad Fine MD   polyethylene glycol (MIRALAX) 17 gram packet Take 1 Packet by mouth daily. 4/10/17  Yes Mehrdad Fine MD   escitalopram oxalate (LEXAPRO) 10 mg tablet Take 10 mg by mouth daily. 9/28/16  Yes Historical Provider   simvastatin (ZOCOR) 40 mg tablet TAKE 1 TAB BY MOUTH NIGHTLY. 9/22/16  Yes Lizbet Pelaez MD   buPROPion XL (WELLBUTRIN XL) 300 mg XL tablet Take 1 Tab by mouth every morning. 11/18/13  Yes Lizbet Pelaez MD   solifenacin (VESICARE) 5 mg tablet Take 5 mg by mouth daily.    Yes Historical Provider     Current Facility-Administered Medications   Medication Dose Route Frequency    calcium carbonate (TUMS) chewable tablet 200 mg [elemental]  200 mg Oral Q6H PRN    fentaNYL (DURAGESIC) 25 mcg/hr patch 1 Patch  1 Patch TransDERmal Q72H    senna (SENOKOT) tablet 8.6 mg  1 Tab Oral DAILY    HYDROmorphone (PF) (DILAUDID) injection 1.5 mg  1.5 mg IntraVENous Q2H PRN    naloxone (NARCAN) injection 0.5 mg  0.5 mg IntraVENous PRN    ondansetron (ZOFRAN) injection 4 mg  4 mg IntraVENous Q6H PRN    cyclobenzaprine (FLEXERIL) tablet 5 mg  5 mg Oral TID PRN    potassium chloride SR (KLOR-CON 10) tablet 40 mEq  40 mEq Oral BID    potassium, sodium phosphates (NEUTRA-PHOS) packet 1 Packet  1 Packet Oral QID    pantoprazole (PROTONIX) tablet 40 mg  40 mg Oral ACB    sodium chloride (NS) flush 5-10 mL  5-10 mL IntraVENous Q8H    sodium chloride (NS) flush 5-10 mL  5-10 mL IntraVENous PRN    HYDROcodone-acetaminophen (NORCO)  mg tablet 1 Tab  1 Tab Oral Q4H PRN    oxybutynin chloride XL (DITROPAN XL) tablet 5 mg  5 mg Oral DAILY    buPROPion SR (WELLBUTRIN SR) tablet 150 mg  150 mg Oral BID      Allergies   Allergen Reactions    Tetracycline Hives     Palms and soles    Lortab [Hydrocodone-Acetaminophen] Itching        Review of Systems:  Pertinent items are noted in HPI.     Mental Status: no dementia    Objective:     Patient Vitals for the past 8 hrs:   BP Temp Pulse Resp SpO2 Weight   17 0932 136/81 97.9 °F (36.6 °C) (!) 110 18 96 % -   17 0658 - - - - - 72.5 kg (159 lb 13.3 oz)     Temp (24hrs), Av °F (36.7 °C), Min:97.6 °F (36.4 °C), Max:98.4 °F (36.9 °C)      EXAM: Awake and alert lying in bed; NAD; agreeable to exam  Right leg with staples and some sutures from surgical procedure still in place; the right knee and distal femoral region are visibly swollen but without palpable joint effusion  Surgical wounds are well healing without any erythema or drainage  Warmth of the limb is noted when compared to the left  Distal sensory function grossly intact  5/5 strength for ankle plantar and dorsiflexion  Distal pulses palpable    Imaging Review: EXAM: CT LOW EXT RT W CONT     INDICATION: Hypercalcemia, surgical fixation of pathologic fracture of the  right femur. Right eye pain and swelling.      COMPARISON: Right femur intraoperative spot fluoroscopic views on 4/6/2017. Right femur CT on 4/4/2017.     TECHNIQUE: Helical CT of the right femur with coronal and sagittal reformats. Images reviewed in soft tissue and bone windows. CT dose reduction was achieved  through the use of a standardized protocol tailored for this examination and  automatic exposure control for dose modulation.     CONTRAST: Post IV contrast 100 mL Isovue-300     FINDINGS: Hardware: Intramedullary domi is in good position within the femur. No  hardware fracture or screw fracture.     Bones: Surgically fixated comminuted fracture of the distal femoral diaphysis is  in anatomic alignment. Surrounding fluid and soft tissue attenuation  heterogeneously enhances, measures 13.2 x 9.3 x 8.0 cm, and extends to the  lateral skin surface.     Joint fluid: New large knee joint effusion. Enhancing synovium in the knee  joint is at least moderate. No right hip joint effusion.     Articulations: No significant arthritis.     Tendons: No full-thickness tendon tear.     Muscles: The heterogeneous process surrounding the pathologic fracture involves  all the surrounding musculature of the quadriceps and biceps femoris muscles.     Soft tissue mass: No lymphadenopathy. No acute process within the partially  imaged pelvis. Cecum has a medial position in the anterior, inferior pelvis.     IMPRESSION  IMPRESSION:   1. 13.2 x 9.3 x 8.0 cm heterogeneously enhancing soft tissue and fluid at the  surgically fixated distal femoral diaphyseal pathologic fracture site is  increased and most likely represents progression of neoplasm.  Superimposed  infection could also cause this appearance. If there is drainage from the  lateral skin, consider laboratory testing. Otherwise, percutaneous biopsy or  attempted drainage could be performed. 2. New knee joint effusion and extensive synovitis. Differential diagnosis is  neoplastic involvement of the knee joint versus new septic arthritis. 3. No new fracture. Labs:   Recent Results (from the past 24 hour(s))   METABOLIC PANEL, COMPREHENSIVE    Collection Time: 05/03/17  6:19 AM   Result Value Ref Range    Sodium 132 (L) 136 - 145 mmol/L    Potassium 5.7 (H) 3.5 - 5.1 mmol/L    Chloride 102 97 - 108 mmol/L    CO2 21 21 - 32 mmol/L    Anion gap 9 5 - 15 mmol/L    Glucose 137 (H) 65 - 100 mg/dL    BUN 12 6 - 20 MG/DL    Creatinine 0.92 0.55 - 1.02 MG/DL    BUN/Creatinine ratio 13 12 - 20      GFR est AA >60 >60 ml/min/1.73m2    GFR est non-AA >60 >60 ml/min/1.73m2    Calcium 8.4 (L) 8.5 - 10.1 MG/DL    Bilirubin, total 0.3 0.2 - 1.0 MG/DL    ALT (SGPT) 15 12 - 78 U/L    AST (SGOT) 17 15 - 37 U/L    Alk.  phosphatase 115 45 - 117 U/L    Protein, total 7.7 6.4 - 8.2 g/dL    Albumin 2.4 (L) 3.5 - 5.0 g/dL    Globulin 5.3 (H) 2.0 - 4.0 g/dL    A-G Ratio 0.5 (L) 1.1 - 2.2     PROTHROMBIN TIME + INR    Collection Time: 05/03/17  6:19 AM   Result Value Ref Range    INR 1.3 (H) 0.9 - 1.1      Prothrombin time 13.0 (H) 9.0 - 11.1 sec   PTT    Collection Time: 05/03/17  6:19 AM   Result Value Ref Range    aPTT 30.6 22.1 - 32.5 sec    aPTT, therapeutic range     58.0 - 77.0 SECS         Impression:     Principal Problem:    Hypercalcemia (4/24/2017)    Active Problems:    Altered mental status (4/24/2017)        Plan:   Pathologic fracture of right femur with internal fixation on 4/6 - new enhancing lesion noted on CT 5/1 - recommend FNA with IR to evaluate lesion for infection vs pathologic extension  Surgical wounds look good and do not appear infected there is no drainage- staples/sutures removed and steri strips placed  Will follow for FNA results    Dr Tahir Khan aware of patient and agrees with current plan of care      Roger Aguilar PA-C  [address]

## 2017-05-04 NOTE — PROGRESS NOTES
Patient did not receive radiation treatment because of pain related to positioning for treatment. Her pain is well controlled when she is not moving. Dr. Kayode Huffman office notified. Order received to give dilaudid 1 mg IV, in addition to PCA and continuous doses, immediately prior to positioning for radiation tomorrow.

## 2017-05-04 NOTE — PROGRESS NOTES
Mrs. Clara Nair was unable to receive her radiation therapy treatment today due to pain. She was unable to tolerate laying flat on our table. Her knee was the source of her pain.

## 2017-05-04 NOTE — CONSULTS
[address]   [address]   [address] [facility]       Name:  Deven Coronado   MR#:  [id number]   :  1960   Account #:  [de-identified]    Date of Consultation:  2017   Date of Adm:  2017       REQUESTING PHYSICIAN: Oncology service. REASON FOR MEDICAL CONSULT: Tachycardia. PRIMARY CARE PHYSICIAN: Samantha Jones MD    SOURCE OF INFORMATION: The patient. CHIEF COMPLAINT: Pain at the site of surgery. HISTORY OF PRESENT ILLNESS: This is a 79-year-old woman with   a past medical history significant for lung cancer, depression,   thromboembolism on Xarelto. She was initially admitted to the hospital   on the hospitalist service because of hypercalcemia and acute delirium   related to the hypercalcemia. The patient was treated conservatively. Together with input from the nephrologist the hypercalcemia has since   resolved. The patient's acute delirium also resolved as a result of   the treatment of the hypercalcemia. The patient has lung cancer with   suspected pathological right femur fracture. She underwent   intervention for the pathological right femur fracture. Prior to this   surgical intervention, Xarelto was discontinued. The patient has not   been on anticoagulation since after the surgical intervention. Medical   consult was requested today because of tachycardia. The patient has   had tachycardia all day long, but it got worse early this morning. Her   heart rate was less than 120 during the day, but early this morning, her   heart rate went fast as 136. The oncologist on-call was informed by the   nurse, who requested a medical consult for evaluation and treatment of   tachycardia. Initially, when the patient was admitted, she was under   the hospitalist service, but because the issue was related to oncology,   the patient was transferred to the oncology service. The patient has   been on the oncology service since then.  The patient denies chest   pain. No prior history of heart disease. The patient has no chest pain. No fever, no rigors, no chills, although she was started on Zosyn by the   oncology service because of suspected infection coming from the right   knee. PAST MEDICAL HISTORY: Lung cancer, depression, pathological   right femur fracture status post intervention and thromboembolism. ALLERGIES: THE PATIENT IS ALLERGIC TO:     1. TETRACYCLINE. 2. LORTAB. MEDICATIONS: Prior to admission:    1. Xarelto 15 mg daily. 2. Zyrtec 10 mg daily. 3. Dulcolax 10 mg suppository daily as needed for constipation. 4. Lexapro 10 mg daily. 5. Flexeril 10 mg daily at bedtime. 6. Pepcid 20 mg daily. 7. Norco 10/325 one tablet every 4 hours as needed. 8. Zocor 40 mg daily. 9. Wellbutrin- mg 1 tablet daily in the evening. 10. Vesicare 5 mg daily. FAMILY HISTORY: This was reviewed. Her mother has ovarian cancer   and a clotting disorder. Father had colon cancer. PAST SURGICAL HISTORY: This is significant for repair of a   pathological fracture, right femur. SOCIAL HISTORY: The patient is a former smoker. No history of   alcohol abuse. REVIEW OF SYSTEMS   HEAD, EYES, EARS, NOSE AND THROAT: No headache, no   dizziness, no blurring of vision, no photophobia. RESPIRATORY: No cough, no shortness of breath, no hemoptysis. CARDIOVASCULAR: No chest pain, no orthopnea, no palpitation. GASTROINTESTINAL: No nausea or vomiting. No diarrhea. No   constipation. GENITOURINARY: No dysuria, no urgency, and no frequency. All   other systems are reviewed and they are negative. PHYSICAL EXAMINATION   GENERAL APPEARANCE: The patient appeared ill, in moderate   distress. VITAL SIGNS: Temperature 98, pulse 136, pressure 118/84,   respiratory rate 16, oxygen saturation 92%. HEAD: Normocephalic, atraumatic. EYES: Normal eye movements. No redness, no drainage, no   discharge.    EARS: Normal external ears with no obvious drainage. NOSE: No deformities. No drainage. MOUTH AND THROAT: No visible oral lesions dry oral mucosa. NECK: Supple. No JVD. No thyromegaly. CHEST: Clear breath sounds. No wheezing, no crackles. HEART: Normal S1 and S2, regular. No clinically appreciable murmur. ABDOMEN: Soft, nontender, normal bowel sounds. CENTRAL NERVOUS SYSTEM: Alert, oriented x3. No gross focal   neurological deficits. EXTREMITIES: No edema. Pulses 2+ bilaterally. MUSCULOSKELETAL: Swelling with intact dressing, right knee, noted. PSYCHIATRIC: Franny mood but flat affect. LYMPHATIC: No cervical lymphadenopathy. SKIN: No active skin lesions seen in the exposed parts of but None. DIAGNOSTIC DATA: None. LABORATORY DATA: Hematology: No recent hematology. CHEMISTRY: Sodium 132, potassium 5.7, chloride 102, CO2 of 21,   glucose 137, BUN 12, creatinine 0.92. Calcium 8.4, bilirubin total 0.3,   total protein 7.7, albumin level 2.4, globulin 5.3, ALT 15, AST 17,   alkaline phosphatase 115. ASSESSMENT    1. Sinus tachycardia. 2. Lung cancer. 3. Depression. 4. Pathological right femur fracture. 5. Thromboembolism. 6. Hyperkalemia. 7. Hyponatremia. 8. Hyperglycemia. PLAN    1. Sinus tachycardia. Will transfer to telemetry bed. Will check cardiac   markers to rule out acute myocardial infarction as a possible cause of   tachycardia. Will also check BNP level. Start the patient on IV fluids. Will check TSH level. The other concern   is pulmonary embolism in a patient who is postoperative and also has   lung cancer. She was Xarelto before surgery. The Xarelto was   discontinued because of the surgery. We will start the patient back on   full-dose Lovenox. We will give the first dose now. The oncology   service will decide whether the patient needs to continue with full-dose   Lovenox. Will also obtain an echocardiogram. Cardiology consult will   also be requested as well. 2. Lung cancer.  The patient is under the care of the oncology service. She will continue to receive evaluation and treatment for the lung   cancer. 3. Depression. We will resume her preadmission medication. 4. Pathological right femur fracture. Will continue postoperative care as   per orthopedic service. 5. Thromboembolism. As stated above. The   patient will require initiation of anticoagulation. We will give the first full   dose of Lovenox. The oncology service will decide whether to continue   with full anticoagulation later on today. 6. Hyperkalemia. The potassium is being repeated. We will await the   new lab results. 7. Hyponatremia. This is mild. Will carry out hydration with normal   saline. Will await new lab results. 8. Hyperglycemia. We will check hemoglobin A1c level. The patient   has no history of diabetes. In summary, this is a 44-year-old woman who was admitted to the   hospitalist service because of hypercalcemia which has since resolved. The patient was transferred to the oncology service. She underwent   intervention for the pathological right femur fracture. The patient   developed persistent tachycardia. The oncologist on-call requested a   medical consult for the persistent sinus tachycardia. The evaluation   and treatment of this patient is as stated above. Thank you for involving the hospitalist service in the care of this   patient. We will continue to follow the patient alongside with the   oncology service. About 1 hour spent on this medical consult.         MD GARRICK Pryor / LIZZIE   D:  05/04/2017   02:39   T:  05/04/2017   07:16   Job #:  [id number]

## 2017-05-04 NOTE — PROGRESS NOTES
Occupational Therapy    Chart reviewed. Pt being transferred off the floor. Will follow up as able and appropriate.  Hui Kingsley, OTR/L

## 2017-05-04 NOTE — CONSULTS
[address] [facility] [address]   [address] [facility]       Name:  Chrissy Jimenez   MR#:  [id number]   :  1960   Account #:  [de-identified]    Date of Consultation:  2017   Date of Adm:  2017       INDICATIONS: I am asked to see the patient for evaluation of her right   thigh. She is status post retrograde IM nail fixation by Dr. Ilana Hernandez   approximately 1 month ago. She does have lung cancer and   presumably had a pathologic fracture of the distal femur. She now has   a mass about the distal femur. On exam of the right leg, neurovascular   status is intact. There is swelling about the distal femur with moderate   tenderness. There is no erythema or clear evidence of infection. There   is no drainage from surgical site. CT: CT scan reveals a 13.2 x 9.3 x 8 cm soft tissue mass about the   distal femur, most likely representing progression of neoplasm. IMPRESSION: Distal right femur mass, status post IM nail fixation. The patient has just come back from a biopsy of the mass. We will   follow up tomorrow. She most likely has progression of a neoplasm, but   we need to rule out infection as well, and again, we will follow up   tomorrow. JO ANN Hirsch MD      Regis Neelam / Angeli Mir   D:  2017   17:53   T:  2017   02:16   Job #:  [id number]

## 2017-05-04 NOTE — PROGRESS NOTES
TRANSFER - OUT REPORT:    Verbal report given to (name) on Melissa Wong  being transferred to ICU (unit) for change in patient condition(Tachycardia)       Report consisted of patients Situation, Background, Assessment and   Recommendations(SBAR). Information from the following report(s) SBAR, Kardex and ED Summary was reviewed with the receiving nurse. Lines:   Peripheral IV 04/27/17 Left Arm (Active)   Site Assessment Clean, dry, & intact 5/3/2017  8:24 PM   Phlebitis Assessment 0 5/3/2017  8:24 PM   Infiltration Assessment 0 5/3/2017  8:24 PM   Dressing Status Clean, dry, & intact 5/3/2017  8:24 PM   Dressing Type Transparent;Tape 5/3/2017  8:24 PM   Hub Color/Line Status Blue; Infusing 5/3/2017  8:24 PM   Action Taken Open ports on tubing capped 5/3/2017  8:24 PM   Alcohol Cap Used Yes 5/3/2017  8:24 PM        Opportunity for questions and clarification was provided.       Patient transported with:   Nursing Supervisor & Charge Nurse

## 2017-05-04 NOTE — PROGRESS NOTES
ORTH FRACTURE PROGRESS NOTE    May 4, 2017  Admit Date:   2017    Post Op day: * No surgery found *    Subjective:    Leonard Milligan remains with leg pain. Was transferred overnight to the ICU for persistent tachycardia. Is on a PCA for pain management which she says is helping but causing her to be somewhat \"foggy\". Had FNA and biopsy of her soft tissue lesion yesterday by Radiology. Pathology reports pending.  She denies any CP or SOB  Tolerating diet       PT/OT:   Gait:  Gait  Base of Support: Widened, Narrowed  Speed/Penny: Slow  Step Length: Right shortened, Left shortened  Gait Abnormalities: Antalgic, Decreased step clearance, Step to gait  Ambulation - Level of Assistance: Stand-by asssistance  Distance (ft): 15 Feet (ft)  Assistive Device: Gait belt, Walker, rolling                 Vital Signs:    Patient Vitals for the past 8 hrs:   BP Temp Pulse Resp SpO2   17 0900 136/88 - (!) 119 16 94 %   17 0800 (!) 136/100 98.8 °F (37.1 °C) (!) 118 12 92 %   17 0650 (!) 129/95 - (!) 123 12 93 %   17 0640 113/87 - (!) 124 12 94 %   17 0633 - 98.8 °F (37.1 °C) (!) 125 14 94 %   17 0630 119/87 - (!) 126 18 95 %   17 0620 117/89 97.8 °F (36.6 °C) (!) 126 18 93 %   17 0610 - - (!) 125 18 94 %   17 0600 93/78 - (!) 126 24 99 %   17 0530 96/81 - (!) 123 20 92 %   17 0515 - - (!) 123 15 94 %   17 0500 (!) 112/99 - (!) 125 19 94 %   17 0430 (!) 130/93 - (!) 126 22 95 %   17 0415 - - (!) 125 23 95 %   17 0400 127/80 - (!) 120 15 92 %   17 0345 - - (!) 121 17 93 %   17 0330 120/76 - (!) 121 12 93 %   17 0315 - - (!) 123 16 93 %   17 0300 117/85 - (!) 123 18 93 %   17 0224 128/84 - (!) 124 24 -   17 0221 - 98.9 °F (37.2 °C) (!) 123 23 -     Temp (24hrs), Av.5 °F (36.9 °C), Min:97.8 °F (36.6 °C), Max:98.9 °F (37.2 °C)      Pain Control:   Pain Assessment  Pain Scale 1: Numeric (0 - 10)  Pain Intensity 1: 4  Pain Onset 1: acute  Pain Location 1: Leg  Pain Orientation 1: Right  Pain Description 1: Aching, Sore  Pain Intervention(s) 1: Encouraged PCA    Meds:    Current Facility-Administered Medications   Medication Dose Route Frequency    0.9% sodium chloride infusion  125 mL/hr IntraVENous CONTINUOUS    escitalopram oxalate (LEXAPRO) tablet 10 mg  10 mg Oral DAILY    enoxaparin (LOVENOX) injection 80 mg  1 mg/kg SubCUTAneous ONCE    0.9% sodium chloride infusion 250 mL  250 mL IntraVENous PRN    HYDROmorphone (PF) 15 mg/30 ml (DILAUDID) PCA   IntraVENous CONTINUOUS    LORazepam (ATIVAN) tablet 1 mg  1 mg Oral TID PRN    senna (SENOKOT) tablet 17.2 mg  2 Tab Oral DAILY    polyethylene glycol (MIRALAX) packet 17 g  17 g Oral Q12H    piperacillin-tazobactam (ZOSYN) 3.375 g in 0.9% sodium chloride (MBP/ADV) 100 mL  3.375 g IntraVENous Q8H    calcium carbonate (TUMS) chewable tablet 200 mg [elemental]  200 mg Oral Q6H PRN    fentaNYL (DURAGESIC) 25 mcg/hr patch 1 Patch  1 Patch TransDERmal Q72H    naloxone (NARCAN) injection 0.5 mg  0.5 mg IntraVENous PRN    ondansetron (ZOFRAN) injection 4 mg  4 mg IntraVENous Q6H PRN    pantoprazole (PROTONIX) tablet 40 mg  40 mg Oral ACB    sodium chloride (NS) flush 5-10 mL  5-10 mL IntraVENous Q8H    sodium chloride (NS) flush 5-10 mL  5-10 mL IntraVENous PRN    oxybutynin chloride XL (DITROPAN XL) tablet 5 mg  5 mg Oral DAILY    buPROPion SR (WELLBUTRIN SR) tablet 150 mg  150 mg Oral BID       LAB:    Recent Labs      05/04/17   0255  05/03/17   0619   HCT  33.0*   --    HGB  10.4*   --    INR   --   1.3*       Transfuse PRBC's:      Assessment & Physician's Comment:  Dressing is clean, dry, and intact  Neurovascular checks within normal limits  Orientation:  Oriented    Principal Problem:    Hypercalcemia (4/24/2017)    Active Problems:    Altered mental status (4/24/2017)        Plan:    Awaiting pathology reports regarding enhancing soft tissue lesion surrounding her pathologic fracture site. Initial description of fluid collected makes post-op infection less likely and leg does not have clinical appearance of infection at this time.   Cont Pain management  Medical management per primary team    Fiorella Reeves PA-C  [address] [facility]

## 2017-05-04 NOTE — PROGRESS NOTES
Physical Therapy  Patient chart reviewed. Attempted to see patient who is being transferred to different unit. Will follow once transfer complete and patient appropriate. Geeta Henry, PT, DPT

## 2017-05-04 NOTE — PROGRESS NOTES
Bedside and Verbal shift change report given to [facility], RN (oncoming nurse) by Sridhar Alvarado RN (offgoing nurse).  Report included the following information SBAR, Kardex, ED Summary, Procedure Summary, Intake/Output, MAR, Recent Results and Cardiac Rhythm ST.

## 2017-05-04 NOTE — PROGRESS NOTES
TRANSFER - OUT REPORT:    Verbal report given to Rico Lopez (name) on [employer]  being transferred to (unit) for routine progression of care       Report consisted of patients Situation, Background, Assessment and   Recommendations(SBAR). Information from the following report(s) SBAR, Kardex, ED Summary, Intake/Output, MAR, Recent Results and Cardiac Rhythm ST was reviewed with the receiving nurse. Lines:   Peripheral IV 04/27/17 Left Arm (Active)   Site Assessment Clean, dry, & intact 5/4/2017  8:00 AM   Phlebitis Assessment 0 5/4/2017  8:00 AM   Infiltration Assessment 0 5/4/2017  8:00 AM   Dressing Status Clean, dry, & intact 5/4/2017  8:00 AM   Dressing Type Transparent 5/4/2017  8:00 AM   Hub Color/Line Status Blue;Capped 5/4/2017  8:00 AM   Action Taken Open ports on tubing capped 5/4/2017  8:00 AM   Alcohol Cap Used Yes 5/4/2017  8:00 AM       Peripheral IV 05/04/17 Right Forearm (Active)   Site Assessment Clean, dry, & intact 5/4/2017  8:00 AM   Phlebitis Assessment 0 5/4/2017  8:00 AM   Infiltration Assessment 0 5/4/2017  8:00 AM   Dressing Status Clean, dry, & intact 5/4/2017  8:00 AM   Dressing Type Transparent 5/4/2017  8:00 AM   Hub Color/Line Status Pink; Infusing 5/4/2017  8:00 AM   Action Taken Open ports on tubing capped 5/4/2017  8:00 AM   Alcohol Cap Used Yes 5/4/2017  8:00 AM        Opportunity for questions and clarification was provided.

## 2017-05-04 NOTE — PROGRESS NOTES
Chief Complaint - follow-up and management of  Lung cancer    Remains tachycardic but asymptomatic, no c/o pain,        Exam  Gen Moderate distress; not confused; HEENT No mucositis; no thrush   Nodes No supraclavicular or cervical adenopathy   Chest / line sites No inflammation   Lungs Clear to auscultation   CV RRR   Abd Non-tender   Ext No edema on L; R leg swollen and tender in distal femur and R knoww   Skin No rashes   Neuro Awake and alert   Other    Time-based care: [] 25-35 min    [] > 35 mi     (Total time with >50% spent counseling/coordination)  Discussed with the following:  []     []  Nursing Staff  [] Consultant    []  Family   []  Other:  Active Medical Problems  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 The mass is large and seems to erode into the joint space of the R knee. I think it is tumor. Will try to give 30 mg/sqm of cisplatin at the  end of the XRT. continue PCA and added prn lorazepam.           Current medications, progress notes, vital signs, radiology, and labs reviewed.     Madelin Grande MD

## 2017-05-04 NOTE — PROGRESS NOTES
Chief Complaint - follow-up and management of  Lung cancer     Exam  Gen Moderate distress; not confused; HEENT No mucositis; no thrush   Nodes No supraclavicular or cervical adenopathy   Chest / line sites No inflammation   Lungs Clear to auscultation   CV RRR   Abd Non-tender   Ext No edema on L; R leg swollen and tender in distal femur and R knoww   Skin No rashes   Neuro Awake and alert   Other    Time-based care: [] 25-35 min    [] > 35 mi     (Total time with >50% spent counseling/coordination)  Discussed with the following:  []     []  Nursing Staff  [] Consultant    []  Family   []  Other:  Active Medical Problems  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 She has had two doses of XRT to the distal femur. The mass is large and seems to erode into the joint space of the R knee. I think it is tumor. I've started her on Zosyn because of tachycardia and pain, but I don't think it is infection; probably tumor. If that is the case, I'd like her to get 30 mg/sqm of cisplatin at the beginning and end of the XRT. I started her on PCA and added prn lorazepam.    She is very upset and frightened. Current medications, progress notes, vital signs, radiology, and labs reviewed.     Louretta Sicard, MD

## 2017-05-04 NOTE — PROGRESS NOTES
05/04/17 1229   Vital Signs   Temp 99.1 °F (37.3 °C)   Temp Source Oral   Pulse (Heart Rate) (!) 118   Heart Rate Source Monitor   Resp Rate 16   O2 Sat (%) 94 %   Level of Consciousness Alert   /83   MAP (Calculated) 100   BP 1 Method Automatic   BP 1 Location Right arm   BP Patient Position At rest   MEWS Score 3   Dr. Josemanuel Oneil aware of tachycardia.

## 2017-05-04 NOTE — PROGRESS NOTES
TRANSFER - IN REPORT:    Verbal report received from [facility] (name) on [employer]  being received from Oncology (unit) for change in patient condition(HR Increase)      Report consisted of patients Situation, Background, Assessment and   Recommendations(SBAR). Information from the following report(s) SBAR was reviewed with the receiving nurse. Opportunity for questions and clarification was provided. Assessment completed upon patients arrival to unit and care assumed. Primary Nurse Vazquez Zaragoza RN and Hunter Johnson RN performed a dual skin assessment on this patient Impairment noted- see wound doc flow sheet (surgical wound)  Yg score is 16    0400 Spoke to Dr Lakeshia Sanders regarding pt plt level of 7, down from 232 on 4/30. He instructed me to call the Oncologist on call.    0403 Paged on call Oncologist Dr Renee Chopra. 0430 Dr Renee Chopra called, informed, orders to transfuse 1 unit Platelets. 0500 Consent signed, labs drawn and sent.    3417 Platelets started. Pt tolerating well.     Terese Mejia notified of Troponin 0.09. Orders in to repeat Q8H, he said to continue with checking. Bedside shift change report given to (oncoming nurse) by Eber Bowen RN (offgoing nurse). Report included the following information SBAR, Intake/Output, MAR, Accordion and Recent Results.

## 2017-05-04 NOTE — PROGRESS NOTES
Attended interdisciplinary rounds in ICU. : Rev. Xu Azar.  Lou Patience; BCC; to contact [phone] [address] Pringle call: [phone]

## 2017-05-04 NOTE — ADT AUTH CERT NOTES
Utilization Review           Systemic or Infectious Condition GRG - Care Day 11 (5/4/2017) by Anneliese Mcdermott RN        Review Status Review Entered       Completed 5/4/2017       Details              Care Day: 11 Care Date: 5/4/2017 Level of Care: ICU       Guideline Day 3        Level Of Care       (X) * Activity level acceptable              Clinical Status       ( ) * Hemodynamic stability       (X) * Respiratory status acceptable       ( ) * Neurologic status acceptable       (X) * Temperature status acceptable       ( ) * No infection, or status acceptable       (X) * No neutropenia, or status acceptable       ( ) * Special infection or injury situations absent       ( ) * Electrolyte status acceptable       ( ) * General Discharge Criteria met              Interventions       (X) * Intake acceptable       ( ) * No inpatient interventions needed                                   * Milestone              Additional Notes       Nanci Varma remains with leg pain. Was transferred overnight to the ICU for persistent tachycardia. Is on a PCA for pain management which she says is helping but causing her to be somewhat \"foggy\". Had FNA and biopsy of her soft tissue lesion yesterday by Radiology. Pathology reports pending. She denies any CP or SOB       Tolerating diet       T 98.8 P 118 RR 12 /100 spO2 92%       1. Sinus tachycardia       - EKG with sinus tach - rate to 136       - BP stable       - Troponin 0.09 - demand ischemia related to elevated rate       - Patient asymptomatic, w/o chest pain, SOB, dizziness or palpitations       - Echo complete, will follow up results       2. Hyperkalemia       - K 5.8       - Will give dose of Lasix       - Follow labs       3. Hypercalcemia       - Per primary team       4. Leukocytosis       - cultures sent       5. S/p repair of pathologic femur fx - right       - Still with considerable pain       - Ortho following       6.  Lung Ca       - Oncology following       - s/p resection of RLL       7. H/o PE       - recent       - Xarelto stopped       - Lovenox 1mg/kg currently       Labs: WBC 21.7, Platelets 7, Troponin 0.09       NS@ 125ml/hr IV, Dilaudid PCA, Benadryl IV, Zosyn IV q8h       Transfuse platelets, Regular diet, daily weight, ambulate with assistance, SCDs           Systemic or Infectious Condition GRG - Care Day 10 (5/3/2017) by Isma Diggs RN        Review Status Review Entered       Completed 5/4/2017       Details              Care Day: 10 Care Date: 5/3/2017 Level of Care: Telemetry       Guideline Day 3        Level Of Care       (X) * Activity level acceptable              Clinical Status       (X) * Hemodynamic stability       (X) * Respiratory status acceptable       ( ) * Neurologic status acceptable       (X) * Temperature status acceptable       ( ) * No infection, or status acceptable       (X) * No neutropenia, or status acceptable       ( ) * Special infection or injury situations absent       ( ) * Electrolyte status acceptable       ( ) * General Discharge Criteria met              Interventions       (X) * Intake acceptable       ( ) * No inpatient interventions needed                                   * Milestone              Additional Notes       Active Medical Problems       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 She has had two doses of XRT to the distal femur. The mass is large and seems to erode into the joint space of the R knee. I think it is tumor. I've started her on Zosyn because of tachycardia and pain, but I don't think it is infection; probably tumor. If that is the case, I'd like her to get 30 mg/sqm of cisplatin at the beginning and end of the XRT.                I started her on PCA and added prn lorazepam.               She is very upset and frightened.       T 98.7 P 123 RR 18 /82 spO2 96%       INR 1.3, PT 13.0, Sodium 132, Potassium 5.7, Glucose 137, Calcium 8.4, Albumin 2.4       Dilaudid PCA, Zofran IV, Zosyn IV q8h, Dilaudid IV x7       Regular diet, daily weight, ambulate with assistance, SCDs           Systemic or Infectious Condition GRG - Care Day 9 (5/2/2017) by Luz Marina Silva RN        Review Status Review Entered       Completed 5/3/2017       Details              Care Day: 9 Care Date: 5/2/2017 Level of Care: Telemetry       Guideline Day 3        Level Of Care       (X) * Activity level acceptable              Clinical Status       (X) * Hemodynamic stability       (X) * Respiratory status acceptable       ( ) * Neurologic status acceptable       (X) * Temperature status acceptable       ( ) * No infection, or status acceptable       (X) * No neutropenia, or status acceptable       ( ) * Special infection or injury situations absent       ( ) * Electrolyte status acceptable       ( ) * General Discharge Criteria met              Interventions       (X) * Intake acceptable       ( ) * No inpatient interventions needed                                   * Milestone              Additional Notes       Active Medical Problems       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 The distal femur mass and knee are hot, but she has no fever or severe leukocytosis.       CT shows a heterogenous mass that could be inflammation or hemorrhage, tumor or infection.       Will see what ortho wants to do. If they think it could be infected hematoma, they may want to explore.  If they think it is all c/w post op inflammation we may sit tight. If they are not sure or think it could be tumor, we might do ulttrasound-guided FNA of the mass and aspirate of the knee joint.        For now will control her pain and continue XRT.       Continue SCD's and no anticoagulation for now.        Await input from ortho.       I reviewed the images from 4/4 when she was admitted and now.  There was some evidence of swelling and blood at that time; possibly it was tumor then that has progressed now.       Dilaudid IV x4, Zofran IV x2       Other orders: regular diet, SCDs, daily weight, neuro assessments, ambulate with assistance       T 98.2 P 109 RR 18 /79 spO2 96%

## 2017-05-04 NOTE — CONSULTS
Cardiology Consult Note      Patient Name: Geetha Mchugh  : 1960 MRN: [id number]  Date: 2017  Time: 10:29 AM    Admit Diagnosis: Altered mental status  Hypercalcemia    Primary Cardiologist: none    Consulting Cardiologist: Mayela Wilson M.D.    Reason for Consult: Tachycardia    Requesting MD: Angelo Rizzo MD    HPI:  Geetha Mchugh is a 61 y.o. female admitted on 2017  for Altered mental status. , Hypercalcemia. Past medical history of Cancer ([address] Depression; History of screening mammography (3/27/2012); Ill-defined condition; Intention tremor; Pap smear for cervical cancer screening (3/27/2012); Stress incontinence; and Tobacco abuse. Admitted for AMS and dizziness. Found to have hypercalcemia. Apparent baseline rate in the 110s- 115s. Early this morning began with rate in the 120s to 130s. Underwent evaluation for subjective palpitations in , however, was negative. Subjective:  Denies CP, SOB or palpitations. She does state she is in considerable pain 2/2 to right knee pathologic fx and s/p arthroscopy. Assessment and Plan     1. Sinus tachycardia   - EKG with sinus tach - rate to 136   - BP stable   - Troponin 0.09 - demand ischemia related to elevated rate   - Patient asymptomatic, w/o chest pain, SOB, dizziness or palpitations   - Echo complete, will follow up results  2. Hyperkalemia   - K 5.8   - Will give dose of Lasix   - Follow labs  3. Hypercalcemia   - Per primary team  4. Leukocytosis   - cultures sent  5. S/p repair of pathologic femur fx - right   - Still with considerable pain   - Ortho following  6. Lung Ca   - Oncology following   - s/p resection of RLL  7. H/o PE   - recent   - Xarelto stopped   - Lovenox 1mg/kg currently    60 yo female with active Lung cancer, s/p RLL resection and actively undergoing radiation therapy. Pathologic fx of right femur - PCA, but still with pain.   Sinus tachycardia to 120-130s. Baseline rate 110s-115. Appropriate response to pain. K elevated, will give dose of Lasix to try to decrease. Patient asymptomatic and stable. Can consider smaller dose Lopressor to help decrease rate, if patient becomes symptomatic. Recommend IV Tylenol for pain control to help with pain and keep patient from feeling \"foggy\". Addendum:  Patient not seen   Transferred out of icu and in radiation  ECG with sinus tachycardia (suspect multifactorial ) likely no need to treat ST pharmacologically  We'll see later or in am  Obtain echo mostly for EF  No acute ischemic changes on ecg  Kodi Whitman M.D. Review of Symptoms:  A comprehensive review of systems was negative except for that written in the HPI. Previous treatment/evaluation includes echocardiogram .  Cardiac risk factors: smoking/ tobacco exposure.     Past Medical History:   Diagnosis Date    Cancer ([facility] [address]     BCC, lung cancer    Depression     History of screening mammography 3/27/2012    Ill-defined condition     fx femur    Intention tremor     Pap smear for cervical cancer screening 3/27/2012    Stress incontinence     Tobacco abuse      Past Surgical History:   Procedure Laterality Date    ENDOSCOPY, COLON, DIAGNOSTIC  2006    HX BREAST BIOPSY Left 09/23/2014    HX ORTHOPAEDIC Right 04/05/2017    biopsy of lesion    HI COLSC FLX W/REMOVAL LESION BY HOT BX FORCEPS  1/4/2013          Current Facility-Administered Medications   Medication Dose Route Frequency    0.9% sodium chloride infusion  125 mL/hr IntraVENous CONTINUOUS    escitalopram oxalate (LEXAPRO) tablet 10 mg  10 mg Oral DAILY    enoxaparin (LOVENOX) injection 80 mg  1 mg/kg SubCUTAneous ONCE    0.9% sodium chloride infusion 250 mL  250 mL IntraVENous PRN    HYDROmorphone (PF) 15 mg/30 ml (DILAUDID) PCA   IntraVENous CONTINUOUS    LORazepam (ATIVAN) tablet 1 mg  1 mg Oral TID PRN    senna (SENOKOT) tablet 17.2 mg  2 Tab Oral DAILY    polyethylene glycol (MIRALAX) packet 17 g  17 g Oral Q12H    piperacillin-tazobactam (ZOSYN) 3.375 g in 0.9% sodium chloride (MBP/ADV) 100 mL  3.375 g IntraVENous Q8H    calcium carbonate (TUMS) chewable tablet 200 mg [elemental]  200 mg Oral Q6H PRN    fentaNYL (DURAGESIC) 25 mcg/hr patch 1 Patch  1 Patch TransDERmal Q72H    naloxone (NARCAN) injection 0.5 mg  0.5 mg IntraVENous PRN    ondansetron (ZOFRAN) injection 4 mg  4 mg IntraVENous Q6H PRN    pantoprazole (PROTONIX) tablet 40 mg  40 mg Oral ACB    sodium chloride (NS) flush 5-10 mL  5-10 mL IntraVENous Q8H    sodium chloride (NS) flush 5-10 mL  5-10 mL IntraVENous PRN    oxybutynin chloride XL (DITROPAN XL) tablet 5 mg  5 mg Oral DAILY    buPROPion SR (WELLBUTRIN SR) tablet 150 mg  150 mg Oral BID       Allergies   Allergen Reactions    Tetracycline Hives     Palms and soles    Lortab [Hydrocodone-Acetaminophen] Itching      Family History   Problem Relation Age of Onset    Ovarian Cancer Mother     Elevated Lipids Mother     Clotting Disorder Mother     Cancer Mother      ovarian    Colon Cancer Father 48    Cancer Father 48     colon      Social History     Social History    Marital status: SINGLE     Spouse name: N/A    Number of children: N/A    Years of education: N/A     Social History Main Topics    Smoking status: Former Smoker     Packs/day: 1.00     Years: 31.00     Quit date: 12/1/2009    Smokeless tobacco: Never Used    Alcohol use No    Drug use: No    Sexual activity: Not Asked     Other Topics Concern    None     Social History Narrative       Objective:    Physical Exam    Vitals:   Vitals:    05/04/17 0650 05/04/17 0800 05/04/17 0900 05/04/17 1000   BP: (!) 129/95 (!) 136/100 136/88 132/90   Pulse: (!) 123 (!) 118 (!) 119 (!) 120   Resp: 12 12 16 14   Temp:  98.8 °F (37.1 °C)     SpO2: 93% 92% 94% 93%   Weight:       Height:           General:    Alert, cooperative, no distress, appears stated age.    Neck: Supple, no carotid bruit and no JVD. Back:     Symmetric, normal curvature. Lungs:     Clear to auscultation bilaterally. Heart[de-identified]    Regular rate and rhythm, S1, S2 normal, no murmur, click, rub or gallop. Tachycardic   Abdomen:     Soft, non-tender. Bowel sounds normal.    Extremities:   Extremities normal, atraumatic, no cyanosis or edema. Vascular:   Pulses - 2+ radials   Skin:   Skin color normal. No rashes or lesions   Neurologic:   CN II-XII grossly intact. Telemetry: normal sinus rhythm, tachycardia    ECG: sinus tachycardia, rate 120-130    Data Review:     Radiology: None current    Recent Labs      05/04/17   0501   TROIQ  0.09*     Recent Labs      05/04/17   0145  05/03/17   0619   NA  130*  132*   K  5.8*  5.7*   CL  100  102   CO2  21  21   BUN  17  12   CREA  1.01  0.92   GLU  130*  137*   PHOS  2.5*   --    CA  8.0*  8.4*     Recent Labs      05/04/17   0255   WBC  21.7*   HGB  10.4*   HCT  33.0*   PLT  7*     Recent Labs      05/04/17   0145  05/03/17   0619   PTP   --   13.0*   INR   --   1.3*   SGOT  18  17   AP  105  115     No results for input(s): TGL, CHOL, LDLC in the last 72 hours.     No lab exists for component: HDLC,  HBA1C  Recent Labs      05/04/17   0501   TSH  3.14         Geovanna Elizabeth PA-C         [facility] [address] [place] [address], [address] [place] [address],[address]     [facility], [address] [facility]     [phone]    Roxana Francisco MD

## 2017-05-04 NOTE — PROGRESS NOTES
05/04/17 0005   Vitals   Temp 98 °F (36.7 °C)   Temp Source Oral   Pulse (Heart Rate) (!) 136   Heart Rate Source Monitor   Resp Rate 16   O2 Sat (%) 92 %   Level of Consciousness Alert   /84   MAP (Calculated) 95   BP 1 Location Right arm   BP 1 Method Automatic   BP Patient Position At rest   MEWS Score 4     Paged MD. Awaiting call back. 0100: Dr. John Thorne returned page. Made aware of patient's tachycardia. Orders for STAT EKG and consult for hospitalist have been put in per MD.     0110: Paged Hospitalist. Spoke with Dr. Deya Cardona and made him aware of the situation with the patient. Dr. Deya Cardona is consulting Dr. Emily Hui. Patient is currently asympotic. Will continue to monitor patient. 0115: EKG being done at pt beside. Spoke with Dr. Emily Hui who added a magnesium and phosphorus to morning labs, and will be at the bedside momentarily.

## 2017-05-04 NOTE — PROGRESS NOTES
Addendum. .. Platelet count this am was 7 thousand,  She has no bleeding, she was given platelets this am, diff dx includes heparin induced/ DIC/ , I will check labs, will hold off on giving cisplatin tomorrow, may need Rx for HIT. Alexandra Wang MD

## 2017-05-05 NOTE — PROGRESS NOTES
Bedside and Verbal shift change report given to Altagracia RN (oncoming nurse) by Oswaldo Jones RN (offgoing nurse). Report included the following information SBAR, Kardex, Intake/Output, MAR, Recent Results and Med Rec Status.

## 2017-05-05 NOTE — PROGRESS NOTES
Physical Therapy  Attempted PT treatment. Spoke with RN who reports patient with decreased mental status overall and having some difficulty with command following. Attempted to work with patient on some bed exercises but reports high pain levels and is unable to participate actively with exercises. Repositioned in bed with needs in reach and nurse notified. Will check back Monday for mobility progression. Continue to recommend SNF level rehab at [facility].   Juno Rollins, PT, DPT  Time: 8 mins

## 2017-05-05 NOTE — PROGRESS NOTES
Called Dr Kulwinder Esparza, per Dr Gómez Lauren office. Informed Dr Kulwinder Esparza of patient's decreased mental status. Received orders from Dr Kulwinder Esparza to discontinue PCA dilaudid and to draw ABG's. Dr Kulwinder Esparza said he would be up soon to examine the patient.

## 2017-05-05 NOTE — PROGRESS NOTES
Spoke to Dr Noel Pimentel office about patient's decreased mental status and MEWS score of 4. Also informed office that the patient did not get radiation today. Orders received to page hospitalist and have them come and assess patient.  Will page hospitalist.

## 2017-05-05 NOTE — PROGRESS NOTES
36- Nurse attempted to change out purwick and soiled bed pad underneath. Pt began screaming at the slightest movement on RLE, started manically pushing on her pain button, and even swung at one of the three nurses attempting to help her turn. She started shaking more than baseline saying she didn't care, she didn't want to be moved at all. Pt was given PRN ativan. She apologized for being so upset. She says the pain is just getting worse. Will consult PT about ways to turn pt with minimal pain so she doesn't experience skin breakdown from being in the same spot in urine.

## 2017-05-05 NOTE — PROGRESS NOTES
Hospitalist Progress Note  Philippe Albarran MD  Office: [phone]  Cell: [phone]      Date of Service:  2017  NAME:  Logan Yap  :  1960  MRN:  [id number]      Admission Summary: This is a 62-year-old woman with a past medical history significant for lung cancer, depression,   thromboembolism on Xarelto. She was initially admitted to the hospital on the hospitalist service because of hypercalcemia and acute delirium related to the hypercalcemia. We were consulted for sinus tachycardia. Interval history / Subjective:      F/u for tachycardia. Patient had a eventful night. She was agitated and delirious and was given some couple of doses of ativan. She is currently very sleepy and not verbally responsive. Her right hand is twitching. Assessment & Plan:     Sinus tachycardia: This is likely a physiologic response to her underlying pathology. She has chronic tachycardia and now her Heart is about near her baseline. Her TSH is wnl. Continue metoprolol XL. Continue NS IVF. 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 Also receiving radiotherapy for the right pathological fracture for pain control. Due to current sedation and altered mental status, suggest that PCA pump should be held  Meantime or hold narcotics for sedation. Hem/Onc is following. Thrombocytopenia: Secondary to malignancy and chemotherapy Vs due to HIT. YANETH pending. No bleeding    Hypercalcemia: Resolved    Leucocytosis: This may be reactive. No fever or chills    H/o PE: On chronic anticoagulation but currently held due to thrombocytopenia. Altered mental status; Suspect secondary to dilaudid PCA pump with ativan,  Hold PCA  For now. Continue fentanyl patch.    Check ABG         Code status: Full  DVT prophylaxis: SCD    Care Plan discussed with: Patient/Family and Nurse  Disposition: TBD, as per primary     Hospital Problems  Date Reviewed: 4/24/2017          Codes Class Noted POA    * (Principal)Hypercalcemia ICD-10-CM: D68.69  ICD-9-CM: 275.42  4/24/2017 Unknown        Altered mental status ICD-10-CM: R41.82  ICD-9-CM: 780.97  4/24/2017 Unknown                Review of Systems:   Pertinent items are noted in HPI. Vital Signs:    Last 24hrs VS reviewed since prior progress note. Most recent are:  Visit Vitals    /67    Pulse (!) 118    Temp 98.7 °F (37.1 °C)    Resp 16    Ht 5' 3\" (1.6 m)    Wt 76 kg (167 lb 8.8 oz)    SpO2 94%    Breastfeeding No    BMI 30.65 kg/m2         Intake/Output Summary (Last 24 hours) at 05/05/17 1416  Last data filed at 05/05/17 0836   Gross per 24 hour   Intake             2684 ml   Output              650 ml   Net             2034 ml        Physical Examination:             Constitutional:  Sedated but easily arousable. Chronically ill looking. , cooperative, pleasant    ENT:  Oral mucous moist, oropharynx benign. Neck supple,    Resp:  CTA bilaterally. No wheezing/rhonchi/rales. No accessory muscle use   CV:  Regular rhythm, Tachycardia, no murmurs, gallops, rubs    GI:  Soft, non distended, non tender. normoactive bowel sounds, no hepatosplenomegaly   : +ve Pur wick amtta catheter connected to sunction. Musculoskeletal:  No edema, warm, 2+ pulses throughout    Neurologic:  Moves all extremities. Lethargic and drowsy but easily arousable.              Data Review:          Labs:     Recent Labs      05/05/17   0327  05/04/17   1249   WBC  19.6*  21.3*   HGB  8.7*  9.0*   HCT  27.8*  29.2*   PLT  17*  51*     Recent Labs      05/05/17   0327  05/04/17   0145  05/03/17   0619   NA  132*  130*  132*   K  4.3  5.8*  5.7*   CL  102  100  102   CO2  23  21  21   BUN  15  17  12   CREA  0.84  1.01  0.92   GLU  117*  130*  137*   CA  7.3*  8.0* 8.4*   MG   --   1.7   --    PHOS   --   2.5*   --      Recent Labs      05/05/17   0327  05/04/17   0145  05/03/17   0619   SGOT  17  18  17   ALT  12  13  15   AP  87  105  115   TBILI  0.3  0.4  0.3   TP  6.8  7.5  7.7   ALB  2.1*  2.3*  2.4*   GLOB  4.7*  5.2*  5.3*     Recent Labs      05/05/17   0430  05/03/17   0619   INR  1.3*  1.3*   PTP  13.1*  13.0*   APTT  31.6  30.6      No results for input(s): FE, TIBC, PSAT, FERR in the last 72 hours. Lab Results   Component Value Date/Time    Folate 15.3 05/02/2016 09:08 AM      No results for input(s): PH, PCO2, PO2 in the last 72 hours.   Recent Labs      05/05/17   0327  05/04/17   1249  05/04/17   0501   TROIQ  0.09*  0.08*  0.09*     Lab Results   Component Value Date/Time    Cholesterol, total 197 10/26/2016 08:49 AM    HDL Cholesterol 58 10/26/2016 08:49 AM    LDL, calculated 122 10/26/2016 08:49 AM    Triglyceride 86 10/26/2016 08:49 AM    CHOL/HDL Ratio 5.4 08/25/2009 12:05 PM     Lab Results   Component Value Date/Time    Glucose (POC) 120 04/24/2017 03:55 AM     Lab Results   Component Value Date/Time    Color YELLOW/STRAW 04/24/2017 04:08 AM    Appearance CLEAR 04/24/2017 04:08 AM    Specific gravity 1.018 04/24/2017 04:08 AM    pH (UA) 6.0 04/24/2017 04:08 AM    Protein NEGATIVE  04/24/2017 04:08 AM    Glucose NEGATIVE  04/24/2017 04:08 AM    Ketone TRACE 04/24/2017 04:08 AM    Bilirubin NEGATIVE  04/09/2017 01:17 PM    Urobilinogen 1.0 04/24/2017 04:08 AM    Nitrites NEGATIVE  04/24/2017 04:08 AM    Leukocyte Esterase NEGATIVE  04/24/2017 04:08 AM    Epithelial cells FEW 04/24/2017 04:08 AM    Bacteria 1+ 04/24/2017 04:08 AM    WBC 0-4 04/24/2017 04:08 AM    RBC 0-5 04/24/2017 04:08 AM         Medications Reviewed:     Current Facility-Administered Medications   Medication Dose Route Frequency    metoprolol succinate (TOPROL-XL) XL tablet 25 mg  25 mg Oral DAILY    0.9% sodium chloride infusion  125 mL/hr IntraVENous CONTINUOUS    escitalopram oxalate (LEXAPRO) tablet 10 mg  10 mg Oral DAILY    0.9% sodium chloride infusion 250 mL  250 mL IntraVENous PRN    HYDROmorphone (PF) (DILAUDID) injection 1 mg  1 mg IntraVENous ONCE    metoprolol (LOPRESSOR) injection 2.5 mg  2.5 mg IntraVENous Q6H PRN    LORazepam (ATIVAN) tablet 1 mg  1 mg Oral TID PRN    senna (SENOKOT) tablet 17.2 mg  2 Tab Oral DAILY    polyethylene glycol (MIRALAX) packet 17 g  17 g Oral Q12H    calcium carbonate (TUMS) chewable tablet 200 mg [elemental]  200 mg Oral Q6H PRN    fentaNYL (DURAGESIC) 25 mcg/hr patch 1 Patch  1 Patch TransDERmal Q72H    naloxone (NARCAN) injection 0.5 mg  0.5 mg IntraVENous PRN    ondansetron (ZOFRAN) injection 4 mg  4 mg IntraVENous Q6H PRN    pantoprazole (PROTONIX) tablet 40 mg  40 mg Oral ACB    sodium chloride (NS) flush 5-10 mL  5-10 mL IntraVENous Q8H    sodium chloride (NS) flush 5-10 mL  5-10 mL IntraVENous PRN    oxybutynin chloride XL (DITROPAN XL) tablet 5 mg  5 mg Oral DAILY    buPROPion SR (WELLBUTRIN SR) tablet 150 mg  150 mg Oral BID     ______________________________________________________________________  EXPECTED LENGTH OF STAY: 3d 7h  ACTUAL LENGTH OF STAY:          11                 Veena Lopez MD

## 2017-05-05 NOTE — PROGRESS NOTES
Chief Complaint - follow-up and management of  Lung cancer    Sleepy today, no c/o pain, no bleeding       Exam  Gen Moderate distress; not confused; HEENT No mucositis; no thrush   Nodes No supraclavicular or cervical adenopathy   Chest / line sites No inflammation   Lungs Clear to auscultation   CV RRR   Abd Non-tender   Ext No edema on L; R leg swollen and tender in distal femur and R knoww   Skin No rashes   Neuro Awake and alert   Other    Time-based care: [] 25-35 min    [] > 35 mi     (Total time with >50% spent counseling/coordination)  Discussed with the following:  []     []  Nursing Staff  [] Consultant    []  Family   []  Other:  Active Medical Problems  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 The mass is large and seems to erode into the joint space of the R knee. Dr. Natalie Bates wanted to give cisplatin with last dose of XRT but acute severe thrombocytopenia has intervened    Thrombocytopenia. .. Count went to 52 after transfusion yesterday, has fallen to 17k today. . No c;;o bleeding. . Suspect this is due to either zosyn or lovenox,,, YANETH is pending, no sign of clotting, will hold off on argatroban for now. No transfusion ordered      continue PCA and added prn lorazepam.           Current medications, progress notes, vital signs, radiology, and labs reviewed.     Maylin Patel MD

## 2017-05-05 NOTE — PROGRESS NOTES
Hospitalist Progress Note  Jolanta Melendez MD  Office: [phone]  Cell: [phone]      Date of Service:  2017  NAME:  Vera Sims  :  1960  MRN:  [id number]      Admission Summary: This is a 48-year-old woman with a past medical history significant for lung cancer, depression,   thromboembolism on Xarelto. She was initially admitted to the hospital on the hospitalist service because of hypercalcemia and acute delirium related to the hypercalcemia. We were consulted for sinus tachycardia. Interval history / Subjective:      F/u for tachycardia. Patient reports being very emotional and in a lot of pain. She denies palpitations and chest pain. Complaining of severe lower back pain. Assessment & Plan:     Sinus tachycardia: This may be due to underlying malignancy in the setting of pain. Her TSH is wnl. Continue NS IVF. 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 Pathological fracture right femur: S/p repair    Thrombocytopenia: Secondary to malignancy and chemotherapy    Hypercalcemia: Resolved    Leucocytosis: This may be reactive. No fever or chills    H/o PE: On chronic anticoagulation    Code status: Full  DVT prophylaxis: SCD    Care Plan discussed with: Patient/Family and Nurse  Disposition: TBD, as per primary     Hospital Problems  Date Reviewed: 2017          Codes Class Noted POA    * (Principal)Hypercalcemia ICD-10-CM: G21.76  ICD-9-CM: 275.42  2017 Unknown        Altered mental status ICD-10-CM: R41.82  ICD-9-CM: 780.97  2017 Unknown                Review of Systems:   Pertinent items are noted in HPI. Vital Signs:    Last 24hrs VS reviewed since prior progress note.  Most recent are:  Visit Vitals    /85 (BP 1 Location: Left arm, BP Patient Position: At rest)    Pulse (!) 121  Temp 99 °F (37.2 °C)    Resp 16    Ht 5' 3\" (1.6 m)    Wt 76 kg (167 lb 8.8 oz)    SpO2 95%    Breastfeeding No    BMI 30.65 kg/m2         Intake/Output Summary (Last 24 hours) at 05/04/17 2022  Last data filed at 05/04/17 1428   Gross per 24 hour   Intake          1037.08 ml   Output              550 ml   Net           487.08 ml        Physical Examination:             Constitutional:  No acute distress, cooperative, pleasant    ENT:  Oral mucous moist, oropharynx benign. Neck supple,    Resp:  CTA bilaterally. No wheezing/rhonchi/rales. No accessory muscle use   CV:  Regular rhythm, Tachycardia, no murmurs, gallops, rubs    GI:  Soft, non distended, non tender. normoactive bowel sounds, no hepatosplenomegaly     Musculoskeletal:  No edema, warm, 2+ pulses throughout    Neurologic:  Moves all extremities. AAOx3, CN II-XII reviewed            Data Review:          Labs:     Recent Labs      05/04/17   1249  05/04/17   0255   WBC  21.3*  21.7*   HGB  9.0*  10.4*   HCT  29.2*  33.0*   PLT  51*  7*     Recent Labs      05/04/17   0145  05/03/17   0619   NA  130*  132*   K  5.8*  5.7*   CL  100  102   CO2  21  21   BUN  17  12   CREA  1.01  0.92   GLU  130*  137*   CA  8.0*  8.4*   MG  1.7   --    PHOS  2.5*   --      Recent Labs      05/04/17   0145  05/03/17   0619   SGOT  18  17   ALT  13  15   AP  105  115   TBILI  0.4  0.3   TP  7.5  7.7   ALB  2.3*  2.4*   GLOB  5.2*  5.3*     Recent Labs      05/03/17   0619   INR  1.3*   PTP  13.0*   APTT  30.6      No results for input(s): FE, TIBC, PSAT, FERR in the last 72 hours. Lab Results   Component Value Date/Time    Folate 15.3 05/02/2016 09:08 AM      No results for input(s): PH, PCO2, PO2 in the last 72 hours.   Recent Labs      05/04/17   1249  05/04/17   0501   TROIQ  0.08*  0.09*     Lab Results   Component Value Date/Time    Cholesterol, total 197 10/26/2016 08:49 AM    HDL Cholesterol 58 10/26/2016 08:49 AM    LDL, calculated 122 10/26/2016 08:49 AM Triglyceride 86 10/26/2016 08:49 AM    CHOL/HDL Ratio 5.4 08/25/2009 12:05 PM     Lab Results   Component Value Date/Time    Glucose (POC) 120 04/24/2017 03:55 AM     Lab Results   Component Value Date/Time    Color YELLOW/STRAW 04/24/2017 04:08 AM    Appearance CLEAR 04/24/2017 04:08 AM    Specific gravity 1.018 04/24/2017 04:08 AM    pH (UA) 6.0 04/24/2017 04:08 AM    Protein NEGATIVE  04/24/2017 04:08 AM    Glucose NEGATIVE  04/24/2017 04:08 AM    Ketone TRACE 04/24/2017 04:08 AM    Bilirubin NEGATIVE  04/09/2017 01:17 PM    Urobilinogen 1.0 04/24/2017 04:08 AM    Nitrites NEGATIVE  04/24/2017 04:08 AM    Leukocyte Esterase NEGATIVE  04/24/2017 04:08 AM    Epithelial cells FEW 04/24/2017 04:08 AM    Bacteria 1+ 04/24/2017 04:08 AM    WBC 0-4 04/24/2017 04:08 AM    RBC 0-5 04/24/2017 04:08 AM         Medications Reviewed:     Current Facility-Administered Medications   Medication Dose Route Frequency    0.9% sodium chloride infusion  125 mL/hr IntraVENous CONTINUOUS    escitalopram oxalate (LEXAPRO) tablet 10 mg  10 mg Oral DAILY    0.9% sodium chloride infusion 250 mL  250 mL IntraVENous PRN    [START ON 5/5/2017] HYDROmorphone (PF) (DILAUDID) injection 1 mg  1 mg IntraVENous ONCE    HYDROmorphone (PF) 15 mg/30 ml (DILAUDID) PCA   IntraVENous CONTINUOUS    LORazepam (ATIVAN) tablet 1 mg  1 mg Oral TID PRN    senna (SENOKOT) tablet 17.2 mg  2 Tab Oral DAILY    polyethylene glycol (MIRALAX) packet 17 g  17 g Oral Q12H    calcium carbonate (TUMS) chewable tablet 200 mg [elemental]  200 mg Oral Q6H PRN    fentaNYL (DURAGESIC) 25 mcg/hr patch 1 Patch  1 Patch TransDERmal Q72H    naloxone (NARCAN) injection 0.5 mg  0.5 mg IntraVENous PRN    ondansetron (ZOFRAN) injection 4 mg  4 mg IntraVENous Q6H PRN    pantoprazole (PROTONIX) tablet 40 mg  40 mg Oral ACB    sodium chloride (NS) flush 5-10 mL  5-10 mL IntraVENous Q8H    sodium chloride (NS) flush 5-10 mL  5-10 mL IntraVENous PRN    oxybutynin chloride XL (DITROPAN XL) tablet 5 mg  5 mg Oral DAILY    buPROPion SR (WELLBUTRIN SR) tablet 150 mg  150 mg Oral BID     ______________________________________________________________________  EXPECTED LENGTH OF STAY: 3d 7h  ACTUAL LENGTH OF STAY:          Chelsea Chun MD

## 2017-05-05 NOTE — PROGRESS NOTES
Occupational Therapy: Spoke with nurse and Physical Therapist. Nurse reports patient now with altered mental status and Physical Therapist and nurse reporting patient is screaming out with pain when moved. Both recommend defer at this time. Will follow.   MICKEY Alexandre/[place]

## 2017-05-05 NOTE — PROGRESS NOTES
1- Dr. Tanisha Miller paged for a critical platelet of 17 and a low calcium of 7.3. Renita said the correct calcium was OK. Will let Dr. Hiwot Thacker know about low platelets in when he does rounds. Results for Sam Lou (MRN [id number]) as of 5/5/2017 04:17   Ref. Range 5/3/2017 06:19 5/3/2017 09:46 5/3/2017 17:10 5/3/2017 18:24 5/4/2017 01:45 5/4/2017 02:55 5/4/2017 02:55 5/4/2017 04:45 5/4/2017 05:01 5/4/2017 12:49 5/5/2017 03:27   PLATELET Latest Ref Range: 150 - 400 K/uL      7 (LL)    51 (L) 17 (LL)   Results for Sam Luo (MRN [id number]) as of 5/5/2017 04:20   Ref.  Range 5/4/2017 01:45 5/4/2017 02:55 5/4/2017 05:01 5/4/2017 12:49 5/5/2017 03:27   Calcium Latest Ref Range: 8.5 - 10.1 MG/DL 8.0 (L)    7.3 (L)

## 2017-05-05 NOTE — PROGRESS NOTES
Cardiology Progress Note            Admit Date: 4/24/2017  Admit Diagnosis: Altered mental status; Hypercalcemia  Date: 5/5/2017     Time: 9:06 AM    HPI:   Isaiah Lemons is a 61 y.o. female admitted on 4/24/2017 for Altered mental status. , Hypercalcemia. Subjective:  Denies CP, SOB or palpitations. On PCA and states she feels like she is falling. Nods off repeatedly 2/2 sedation. Continues with R knee pain.     Assessment and Plan      1. Sinus tachycardia   - EKG with sinus tach - rate improving some   - BP stable   - Troponin 0.09 - demand ischemia related to elevated rate   - Patient asymptomatic, w/o chest pain, SOB, dizziness or palpitations   - Echo EF 50%, moderate wall thickness. Normal valve structures. 2. Hyperkalemia - resolved   - K 4.3   - Follow labs  3. Hypercalcemia   - Per primary team  4. Leukocytosis   - cultures sent - no growth so far  5. S/p repair of pathologic femur fx - right   - Still with considerable pain   - Ortho following   - Consider IV Tylenol  6. Lung Ca   - Oncology following   - s/p resection of RLL  7. H/o PE   - recent   - Xarelto stopped   - Lovenox 1mg/kg currently     Continues in ST. Echo with EF 50%, low side of normal.  Suspect tachycardia is tiring heart some. Started Toprol XL 25 mg PO to help keep rate down to recover EF. Continues in considerable R knee pain and becoming over sedated with narcotics. RECOMMEND IV tylenol to help with pain and keep sedation levels low. Needs no further cardiac testing at this time. I have seen and examined the patient and agree with  PA assessment  Discussed use of toporol given low normal EF  No additional medications for now and f/u echo in 3-4 weeks as outpatient   We'll check HR tomorrow      ROS:  A comprehensive review of systems was negative except for that written in the HPI.     Objective:      Physical Exam:                Visit Vitals    /67    Pulse (!) 118    Temp 98.7 °F (37.1 °C)    Resp 16    Ht 5' 3\" (1.6 m)    Wt 76 kg (167 lb 8.8 oz)    SpO2 94%    Breastfeeding No    BMI 30.65 kg/m2          General Appearance:   Well developed, well nourished,alert and oriented, and   individual in no acute distress. Ears/Nose/Mouth/Throat:    Hearing grossly normal.         Neck:  Supple. Chest:    Lungs clear to auscultation bilaterally. Cardiovascular:   Regular rate and rhythm, S1, S2 normal, no murmur. Tachycardic   Abdomen:    Soft, non-tender, bowel sounds are active. Extremities:  No edema bilaterally. Skin:  Warm and dry. Telemetry: normal sinus rhythm          Data Review:    Labs:    Recent Results (from the past 24 hour(s))   TROPONIN I    Collection Time: 05/04/17 12:49 PM   Result Value Ref Range    Troponin-I, Qt. 0.08 (H) <0.05 ng/mL   CBC WITH AUTOMATED DIFF    Collection Time: 05/04/17 12:49 PM   Result Value Ref Range    WBC 21.3 (H) 3.6 - 11.0 K/uL    RBC 2.97 (L) 3.80 - 5.20 M/uL    HGB 9.0 (L) 11.5 - 16.0 g/dL    HCT 29.2 (L) 35.0 - 47.0 %    MCV 98.3 80.0 - 99.0 FL    MCH 30.3 26.0 - 34.0 PG    MCHC 30.8 30.0 - 36.5 g/dL    RDW 15.9 (H) 11.5 - 14.5 %    PLATELET 51 (L) 895 - 400 K/uL    NEUTROPHILS 85 (H) 32 - 75 %    LYMPHOCYTES 7 (L) 12 - 49 %    MONOCYTES 6 5 - 13 %    EOSINOPHILS 2 0 - 7 %    BASOPHILS 0 0 - 1 %    ABS. NEUTROPHILS 17.9 (H) 1.8 - 8.0 K/UL    ABS. LYMPHOCYTES 1.6 0.8 - 3.5 K/UL    ABS. MONOCYTES 1.3 (H) 0.0 - 1.0 K/UL    ABS. EOSINOPHILS 0.5 (H) 0.0 - 0.4 K/UL    ABS.  BASOPHILS 0.0 0.0 - 0.1 K/UL   D DIMER    Collection Time: 05/04/17 12:49 PM   Result Value Ref Range    D-dimer 3.94 (H) 0.00 - 0.65 mg/L FEU   TROPONIN I    Collection Time: 05/05/17  3:27 AM   Result Value Ref Range    Troponin-I, Qt. 0.09 (H) <0.05 ng/mL   CBC WITH AUTOMATED DIFF    Collection Time: 05/05/17  3:27 AM   Result Value Ref Range    WBC 19.6 (H) 3.6 - 11.0 K/uL    RBC 2.84 (L) 3.80 - 5.20 M/uL    HGB 8.7 (L) 11.5 - 16.0 g/dL    HCT 27.8 (L) 35.0 - 47.0 %    MCV 97.9 80.0 - 99.0 FL    MCH 30.6 26.0 - 34.0 PG    MCHC 31.3 30.0 - 36.5 g/dL    RDW 15.7 (H) 11.5 - 14.5 %    PLATELET 17 (LL) 961 - 400 K/uL    NEUTROPHILS 78 (H) 32 - 75 %    BAND NEUTROPHILS 5 0 - 6 %    LYMPHOCYTES 4 (L) 12 - 49 %    MONOCYTES 9 5 - 13 %    EOSINOPHILS 4 0 - 7 %    BASOPHILS 0 0 - 1 %    ABS. NEUTROPHILS 16.2 (H) 1.8 - 8.0 K/UL    ABS. LYMPHOCYTES 0.8 0.8 - 3.5 K/UL    ABS. MONOCYTES 1.8 (H) 0.0 - 1.0 K/UL    ABS. EOSINOPHILS 0.8 (H) 0.0 - 0.4 K/UL    ABS. BASOPHILS 0.0 0.0 - 0.1 K/UL    DF MANUAL      RBC COMMENTS ANISOCYTOSIS  1+        RBC COMMENTS MACROCYTOSIS  1+        RBC COMMENTS POLYCHROMASIA  PRESENT        WBC COMMENTS VACUOLATED POLYS     METABOLIC PANEL, COMPREHENSIVE    Collection Time: 05/05/17  3:27 AM   Result Value Ref Range    Sodium 132 (L) 136 - 145 mmol/L    Potassium 4.3 3.5 - 5.1 mmol/L    Chloride 102 97 - 108 mmol/L    CO2 23 21 - 32 mmol/L    Anion gap 7 5 - 15 mmol/L    Glucose 117 (H) 65 - 100 mg/dL    BUN 15 6 - 20 MG/DL    Creatinine 0.84 0.55 - 1.02 MG/DL    BUN/Creatinine ratio 18 12 - 20      GFR est AA >60 >60 ml/min/1.73m2    GFR est non-AA >60 >60 ml/min/1.73m2    Calcium 7.3 (L) 8.5 - 10.1 MG/DL    Bilirubin, total 0.3 0.2 - 1.0 MG/DL    ALT (SGPT) 12 12 - 78 U/L    AST (SGOT) 17 15 - 37 U/L    Alk.  phosphatase 87 45 - 117 U/L    Protein, total 6.8 6.4 - 8.2 g/dL    Albumin 2.1 (L) 3.5 - 5.0 g/dL    Globulin 4.7 (H) 2.0 - 4.0 g/dL    A-G Ratio 0.4 (L) 1.1 - 2.2     PTT    Collection Time: 05/05/17  4:30 AM   Result Value Ref Range    aPTT 31.6 22.1 - 32.5 sec    aPTT, therapeutic range     58.0 - 77.0 SECS   PROTHROMBIN TIME + INR    Collection Time: 05/05/17  4:30 AM   Result Value Ref Range    INR 1.3 (H) 0.9 - 1.1      Prothrombin time 13.1 (H) 9.0 - 11.1 sec   HEMOGLOBIN A1C WITH EAG    Collection Time: 05/05/17  5:12 AM   Result Value Ref Range    Hemoglobin A1c 5.9 4.2 - 6.3 %    Est. average glucose 123 mg/dL   POC VENOUS BLOOD GAS    Collection Time: 05/05/17 11:22 AM   Result Value Ref Range    Device: ROOM AIR      pH, venous (POC) 7.318 (L) 7.32 - 7.42      pCO2, venous (POC) 39.1 (L) 41 - 51 MMHG    pO2, venous (POC) 47 (H) 25 - 40 mmHg    HCO3, venous (POC) 20.1 (L) 23.0 - 28.0 MMOL/L    sO2, venous (POC) 80 65 - 88 %    Base deficit, venous (POC) 6 mmol/L    Allens test (POC) YES      Site LEFT RADIAL      Specimen type (POC) VENOUS BLOOD     POC EG7    Collection Time: 05/05/17 11:36 AM   Result Value Ref Range    Calcium, ionized (POC) 1.03 (L) 1.12 - 1.32 mmol/L    pH (POC) 7.337 (L) 7.35 - 7.45      pCO2 (POC) 36.2 35.0 - 45.0 MMHG    pO2 (POC) 68 (L) 80 - 100 MMHG    HCO3 (POC) 19.4 (L) 22 - 26 MMOL/L    Base deficit (POC) 6 mmol/L    sO2 (POC) 92 92 - 97 %    Site RIGHT RADIAL      Device: ROOM AIR      Allens test (POC) YES      Specimen type (POC) ARTERIAL            Radiology:        Current Facility-Administered Medications   Medication Dose Route Frequency    metoprolol succinate (TOPROL-XL) XL tablet 25 mg  25 mg Oral DAILY    0.9% sodium chloride infusion  125 mL/hr IntraVENous CONTINUOUS    escitalopram oxalate (LEXAPRO) tablet 10 mg  10 mg Oral DAILY    0.9% sodium chloride infusion 250 mL  250 mL IntraVENous PRN    HYDROmorphone (PF) (DILAUDID) injection 1 mg  1 mg IntraVENous ONCE    metoprolol (LOPRESSOR) injection 2.5 mg  2.5 mg IntraVENous Q6H PRN    LORazepam (ATIVAN) tablet 1 mg  1 mg Oral TID PRN    senna (SENOKOT) tablet 17.2 mg  2 Tab Oral DAILY    polyethylene glycol (MIRALAX) packet 17 g  17 g Oral Q12H    calcium carbonate (TUMS) chewable tablet 200 mg [elemental]  200 mg Oral Q6H PRN    fentaNYL (DURAGESIC) 25 mcg/hr patch 1 Patch  1 Patch TransDERmal Q72H    naloxone (NARCAN) injection 0.5 mg  0.5 mg IntraVENous PRN    ondansetron (ZOFRAN) injection 4 mg  4 mg IntraVENous Q6H PRN    pantoprazole (PROTONIX) tablet 40 mg  40 mg Oral ACB    sodium chloride (NS) flush 5-10 mL  5-10 mL IntraVENous Q8H    sodium chloride (NS) flush 5-10 mL  5-10 mL IntraVENous PRN    oxybutynin chloride XL (DITROPAN XL) tablet 5 mg  5 mg Oral DAILY    buPROPion SR (WELLBUTRIN SR) tablet 150 mg  150 mg Oral BID          Melanie Franco MD     [facility] [address] [place] [address] [address] [address],[address] [facility]   Kade Armstrong   [phone]

## 2017-05-05 NOTE — PROGRESS NOTES
Patient reviewed in rounds. Patient not ready for discharge at this time. CM faxed updates to [facility]. There are no CM consults or needs at this time. CM will continue to follow and assist with disposition needs as they arise.     NITZA Arango/JUAN ANTONIO  11:30 AM

## 2017-05-06 NOTE — PROGRESS NOTES
Bedside and Verbal shift change report given to Carlos Sharma and Rosana Buckley RN (oncoming nurse) by Priyanka Roman RN (offgoing nurse). Report included the following information SBAR, Kardex, MAR and Recent Results.

## 2017-05-06 NOTE — PROGRESS NOTES
Oncology Progress Note     Admit Date: 2017    Pt is tearful, crying, wants to go home; states that her pain is controlled, no cp or sob          Subjective:         Current Facility-Administered Medications   Medication Dose Route Frequency    metoprolol succinate (TOPROL-XL) XL tablet 25 mg  25 mg Oral DAILY    HYDROmorphone (PF) 15 mg/30 ml (DILAUDID) PCA   IntraVENous CONTINUOUS    0.9% sodium chloride infusion  125 mL/hr IntraVENous CONTINUOUS    escitalopram oxalate (LEXAPRO) tablet 10 mg  10 mg Oral DAILY    0.9% sodium chloride infusion 250 mL  250 mL IntraVENous PRN    metoprolol (LOPRESSOR) injection 2.5 mg  2.5 mg IntraVENous Q6H PRN    LORazepam (ATIVAN) tablet 1 mg  1 mg Oral TID PRN    senna (SENOKOT) tablet 17.2 mg  2 Tab Oral DAILY    polyethylene glycol (MIRALAX) packet 17 g  17 g Oral Q12H    calcium carbonate (TUMS) chewable tablet 200 mg [elemental]  200 mg Oral Q6H PRN    fentaNYL (DURAGESIC) 25 mcg/hr patch 1 Patch  1 Patch TransDERmal Q72H    naloxone (NARCAN) injection 0.5 mg  0.5 mg IntraVENous PRN    ondansetron (ZOFRAN) injection 4 mg  4 mg IntraVENous Q6H PRN    pantoprazole (PROTONIX) tablet 40 mg  40 mg Oral ACB    sodium chloride (NS) flush 5-10 mL  5-10 mL IntraVENous Q8H    sodium chloride (NS) flush 5-10 mL  5-10 mL IntraVENous PRN    oxybutynin chloride XL (DITROPAN XL) tablet 5 mg  5 mg Oral DAILY    buPROPion SR (WELLBUTRIN SR) tablet 150 mg  150 mg Oral BID        Review of Systems:  Pertinent items are noted in HPI.     Objective:     Patient Vitals for the past 8 hrs:   BP Temp Pulse Resp SpO2   17 0909 114/65 98.7 °F (37.1 °C) (!) 112 18 94 %   17 0507 124/66 98.3 °F (36.8 °C) (!) 114 18 90 %       Temp (24hrs), Av.4 °F (36.9 °C), Min:97.9 °F (36.6 °C), Max:98.7 °F (37.1 °C)           Physical Exam:  Alert  Lungs decreased BS at bases  Heart regular  Abdomen soft  Ext no edema    Labs:    Recent Results (from the past 24 hour(s))   POC VENOUS BLOOD GAS    Collection Time: 05/05/17 11:22 AM   Result Value Ref Range    Device: ROOM AIR      pH, venous (POC) 7.318 (L) 7.32 - 7.42      pCO2, venous (POC) 39.1 (L) 41 - 51 MMHG    pO2, venous (POC) 47 (H) 25 - 40 mmHg    HCO3, venous (POC) 20.1 (L) 23.0 - 28.0 MMOL/L    sO2, venous (POC) 80 65 - 88 %    Base deficit, venous (POC) 6 mmol/L    Allens test (POC) YES      Site LEFT RADIAL      Specimen type (POC) VENOUS BLOOD     POC EG7    Collection Time: 05/05/17 11:36 AM   Result Value Ref Range    Calcium, ionized (POC) 1.03 (L) 1.12 - 1.32 mmol/L    pH (POC) 7.337 (L) 7.35 - 7.45      pCO2 (POC) 36.2 35.0 - 45.0 MMHG    pO2 (POC) 68 (L) 80 - 100 MMHG    HCO3 (POC) 19.4 (L) 22 - 26 MMOL/L    Base deficit (POC) 6 mmol/L    sO2 (POC) 92 92 - 97 %    Site RIGHT RADIAL      Device: ROOM AIR      Allens test (POC) YES      Specimen type (POC) ARTERIAL         Assessment:     Principal Problem/plan:  Sarcomatoid carcinoma; stage IV; treatment per Dr Cecilia Bush  Femur fracture, pathologic; s/p repair, XRT  Pain controlled on PCA and duragesic,   Hypercalcemia s/p zometa, IVF, Ca normalized;   Thrombocytopenia severe, s/p transfusion 2 days ago, plts were yesterday 17K, today are pending; transfuse if < 7 or bleeding, YANETH pending; likely 2 to zosyn;   Depression on lexapro/wellbutrin  VTE of malignancy, off anticoagulation for thrombocytopenia         Plan:     See above

## 2017-05-06 NOTE — PROGRESS NOTES
Problem: Pressure Ulcer - Risk of  Goal: *Prevention of pressure ulcer  Outcome: Not Progressing Towards Goal  Variance: Patient Uncooperative  Comments: Pt. Refuses to turn because of pain    Comments:   Pt.  Refuses to turn to either side because of pain

## 2017-05-06 NOTE — PROGRESS NOTES
1113: Received critical lab value platelets 2.  5517: Paged [facility] regarding critical lab values. 1125: Spoke with Dr. Kae Closs and updated on critical lab values. Orders received to transfuse 1 unit of platelets and follow up with scheduled labs tomorrow AM.   1134: Spoke with blood bank who states they already have platelets for the patient. Per blood bank, platelets are ready for transfusion. Will transfuse per MD orders.

## 2017-05-06 NOTE — PROGRESS NOTES
Bedside shift change report given to Fiorella Florez RN  (oncoming nurse) by Destiny Perkins RN (offgoing nurse). Report included the following information SBAR, Kardex, Intake/Output, MAR and Recent Results.

## 2017-05-06 NOTE — PROGRESS NOTES
05/06/17 0050   Vital Signs   Temp 98.5 °F (36.9 °C)   Temp Source Axillary   Pulse (Heart Rate) (!) 112   Heart Rate Source Monitor   Resp Rate 17   O2 Sat (%) 97 %   Level of Consciousness Alert   /68   MAP (Calculated) 82   BP 1 Method Automatic   BP 1 Location Right arm   BP Patient Position At rest   MEWS Score 3   MD is aware of pt heart rate will continue to monitor

## 2017-05-06 NOTE — PROGRESS NOTES
05/05/17 2105   Vital Signs   Temp 98.5 °F (36.9 °C)   Temp Source Oral   Pulse (Heart Rate) (!) 112   Resp Rate 18   O2 Sat (%) 93 %   Level of Consciousness Alert   /68   MAP (Calculated) 90   MEWS Score 3   Pain 1   Pain Scale 1 Numeric (0 - 10)   Pain Intensity 1 5   Patient Stated Pain Goal 0   Pain Location 1 Hip   Pain Orientation 1 Right   Pain Description 1 Constant   Pain Intervention(s) 1 Encouraged PCA   MD is aware of pt HR will continue to monitor

## 2017-05-06 NOTE — PROGRESS NOTES
05/06/17 0507   Vital Signs   Temp 98.3 °F (36.8 °C)   Temp Source Oral   Pulse (Heart Rate) (!) 114   Resp Rate 18   O2 Sat (%) 90 %   Level of Consciousness Alert   /66   MAP (Calculated) 85   BP 1 Method Automatic   BP 1 Location Left arm   BP Patient Position At rest   MEWS Score 3   MD aware of pt.  HR, will continue to monitor

## 2017-05-06 NOTE — PROGRESS NOTES
05/06/17 0909   Vital Signs   Temp 98.7 °F (37.1 °C)   MD aware of patient's HR and MEWs. No new orders, continue to monitor.

## 2017-05-06 NOTE — PROGRESS NOTES
05/06/17 0909   Vital Signs   Temp 98.7 °F (37.1 °C)   Temp Source Oral   Pulse (Heart Rate) (!) 112   Resp Rate 18   O2 Sat (%) 94 %   Level of Consciousness Alert   /65   MAP (Calculated) 81   BP 1 Method Automatic   BP 1 Location Left arm   BP Patient Position At rest   MEWS Score 3   MD aware of increased HR and MEWs. No new orders at this time. Continue to monitor.

## 2017-05-07 NOTE — PROGRESS NOTES
Hospitalist Progress Note  Art Chambers MD  Office: [phone]  Cell: [phone]      Date of Service:  2017  NAME:  Suzi Mijares  :  1960  MRN:  [id number]      Admission Summary: This is a 53-year-old woman with a past medical history significant for lung cancer, depression,   thromboembolism on Xarelto. She was initially admitted to the hospital on the hospitalist service because of hypercalcemia and acute delirium related to the hypercalcemia. We were consulted for sinus tachycardia. Interval history / Subjective:      F/u for tachycardia. No new complaints. She is brighter today. No new complaints. Assessment & Plan:     Sinus tachycardia: This is likely a physiologic response to her underlying pathology. She has chronic tachycardia and now her Heart is about near her baseline. Her TSH is wnl. Continue metoprolol XL. Continue NS IVF. Improving. 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 Also receiving radiotherapy for the right pathological fracture for pain control. Due to current sedation and altered mental status, suggest that PCA pump should be held  Meantime or hold narcotics for sedation. Hem/Onc is following. Thrombocytopenia: Secondary to malignancy and chemotherapy Vs due to HIT. YANETH pending. No bleeding   PLTs 2, s/p platelets transfusion. Management as per Hem/Onc    Hypercalcemia: Resolved    Leucocytosis: This may be reactive. No fever or chills    H/o PE: On chronic anticoagulation but currently held due to thrombocytopenia. Altered mental status; Suspect secondary to dilaudid PCA pump with ativan,  Hold PCA  For now. Continue fentanyl patch.    Check ABG         Code status: Full  DVT prophylaxis: SCD    Care Plan discussed with: Patient/Family and Nurse  Disposition: TBD, as per primary     Hospital Problems  Date Reviewed: 4/24/2017          Codes Class Noted POA    * (Principal)Hypercalcemia ICD-10-CM: J41.88  ICD-9-CM: 275.42  4/24/2017 Unknown        Altered mental status ICD-10-CM: R41.82  ICD-9-CM: 780.97  4/24/2017 Unknown                Review of Systems:   Pertinent items are noted in HPI. Vital Signs:    Last 24hrs VS reviewed since prior progress note. Most recent are:  Visit Vitals    /69 (BP 1 Location: Right arm, BP Patient Position: At rest)    Pulse (!) 103    Temp 98 °F (36.7 °C)    Resp 18    Ht 5' 3\" (1.6 m)    Wt 76 kg (167 lb 8.8 oz)    SpO2 97%    Breastfeeding No    BMI 30.65 kg/m2         Intake/Output Summary (Last 24 hours) at 05/06/17 2115  Last data filed at 05/06/17 1351   Gross per 24 hour   Intake             2639 ml   Output              450 ml   Net             2189 ml        Physical Examination:             Constitutional:  Sedated but easily arousable. Chronically ill looking. , cooperative, pleasant    ENT:  Oral mucous moist, oropharynx benign. Neck supple,    Resp:  CTA bilaterally. No wheezing/rhonchi/rales. No accessory muscle use   CV:  Regular rhythm, Tachycardia, no murmurs, gallops, rubs    GI:  Soft, non distended, non tender. normoactive bowel sounds, no hepatosplenomegaly   : +ve Pur wick matta catheter connected to sunction. Musculoskeletal:  No edema, warm, 2+ pulses throughout    Neurologic:  Moves all extremities. Lethargic and drowsy but easily arousable.              Data Review:          Labs:     Recent Labs      05/06/17   1051  05/05/17   0327   WBC  17.9*  19.6*   HGB  8.0*  8.7*   HCT  25.0*  27.8*   PLT  2*  17*     Recent Labs      05/05/17   0327  05/04/17   0145   NA  132*  130*   K  4.3  5.8*   CL  102  100   CO2  23  21   BUN  15  17   CREA  0.84  1.01   GLU  117*  130*   CA  7.3*  8.0*   MG   --   1.7   PHOS   --   2.5*     Recent Labs 05/05/17   0327  05/04/17   0145   SGOT  17  18   ALT  12  13   AP  87  105   TBILI  0.3  0.4   TP  6.8  7.5   ALB  2.1*  2.3*   GLOB  4.7*  5.2*     Recent Labs      05/05/17   0430   INR  1.3*   PTP  13.1*   APTT  31.6      No results for input(s): FE, TIBC, PSAT, FERR in the last 72 hours. Lab Results   Component Value Date/Time    Folate 15.3 05/02/2016 09:08 AM      No results for input(s): PH, PCO2, PO2 in the last 72 hours.   Recent Labs      05/05/17   0327  05/04/17   1249  05/04/17   0501   TROIQ  0.09*  0.08*  0.09*     Lab Results   Component Value Date/Time    Cholesterol, total 197 10/26/2016 08:49 AM    HDL Cholesterol 58 10/26/2016 08:49 AM    LDL, calculated 122 10/26/2016 08:49 AM    Triglyceride 86 10/26/2016 08:49 AM    CHOL/HDL Ratio 5.4 08/25/2009 12:05 PM     Lab Results   Component Value Date/Time    Glucose (POC) 120 04/24/2017 03:55 AM     Lab Results   Component Value Date/Time    Color YELLOW/STRAW 04/24/2017 04:08 AM    Appearance CLEAR 04/24/2017 04:08 AM    Specific gravity 1.018 04/24/2017 04:08 AM    pH (UA) 6.0 04/24/2017 04:08 AM    Protein NEGATIVE  04/24/2017 04:08 AM    Glucose NEGATIVE  04/24/2017 04:08 AM    Ketone TRACE 04/24/2017 04:08 AM    Bilirubin NEGATIVE  04/09/2017 01:17 PM    Urobilinogen 1.0 04/24/2017 04:08 AM    Nitrites NEGATIVE  04/24/2017 04:08 AM    Leukocyte Esterase NEGATIVE  04/24/2017 04:08 AM    Epithelial cells FEW 04/24/2017 04:08 AM    Bacteria 1+ 04/24/2017 04:08 AM    WBC 0-4 04/24/2017 04:08 AM    RBC 0-5 04/24/2017 04:08 AM         Medications Reviewed:     Current Facility-Administered Medications   Medication Dose Route Frequency    0.9% sodium chloride infusion 250 mL  250 mL IntraVENous PRN    metoprolol succinate (TOPROL-XL) XL tablet 25 mg  25 mg Oral DAILY    HYDROmorphone (PF) 15 mg/30 ml (DILAUDID) PCA   IntraVENous CONTINUOUS    0.9% sodium chloride infusion  125 mL/hr IntraVENous CONTINUOUS    escitalopram oxalate (LEXAPRO) tablet 10 mg  10 mg Oral DAILY    0.9% sodium chloride infusion 250 mL  250 mL IntraVENous PRN    metoprolol (LOPRESSOR) injection 2.5 mg  2.5 mg IntraVENous Q6H PRN    LORazepam (ATIVAN) tablet 1 mg  1 mg Oral TID PRN    senna (SENOKOT) tablet 17.2 mg  2 Tab Oral DAILY    polyethylene glycol (MIRALAX) packet 17 g  17 g Oral Q12H    calcium carbonate (TUMS) chewable tablet 200 mg [elemental]  200 mg Oral Q6H PRN    fentaNYL (DURAGESIC) 25 mcg/hr patch 1 Patch  1 Patch TransDERmal Q72H    naloxone (NARCAN) injection 0.5 mg  0.5 mg IntraVENous PRN    ondansetron (ZOFRAN) injection 4 mg  4 mg IntraVENous Q6H PRN    pantoprazole (PROTONIX) tablet 40 mg  40 mg Oral ACB    sodium chloride (NS) flush 5-10 mL  5-10 mL IntraVENous Q8H    sodium chloride (NS) flush 5-10 mL  5-10 mL IntraVENous PRN    oxybutynin chloride XL (DITROPAN XL) tablet 5 mg  5 mg Oral DAILY    buPROPion SR (WELLBUTRIN SR) tablet 150 mg  150 mg Oral BID     ______________________________________________________________________  EXPECTED LENGTH OF STAY: 3d 7h  ACTUAL LENGTH OF STAY:          12                 Lazaro Meza MD

## 2017-05-07 NOTE — PROGRESS NOTES
Bedside shift change report given to Lucero (oncoming nurse) by Jade Cuenca (offgoing nurse). Report included the following information SBAR.

## 2017-05-07 NOTE — PROGRESS NOTES
Informed by FRENCH Berry Gain of patient platelets, MD aware of platelet count, results improving. Will continue to monitor.

## 2017-05-07 NOTE — PROGRESS NOTES
Bedside and Verbal shift change report given to Myriam Alarcon and Jersey Maurer RN (oncoming nurse) by Alivia Rivera RN (offgoing nurse). Report included the following information SBAR, Kardex, MAR and Recent Results.

## 2017-05-07 NOTE — PROGRESS NOTES
Oncology Progress Note     Admit Date: 2017    Pt is better today, is anxious, no bleeding; pain is controlled,           Subjective:         Current Facility-Administered Medications   Medication Dose Route Frequency    0.9% sodium chloride infusion 250 mL  250 mL IntraVENous PRN    metoprolol succinate (TOPROL-XL) XL tablet 25 mg  25 mg Oral DAILY    HYDROmorphone (PF) 15 mg/30 ml (DILAUDID) PCA   IntraVENous CONTINUOUS    0.9% sodium chloride infusion  125 mL/hr IntraVENous CONTINUOUS    escitalopram oxalate (LEXAPRO) tablet 10 mg  10 mg Oral DAILY    0.9% sodium chloride infusion 250 mL  250 mL IntraVENous PRN    metoprolol (LOPRESSOR) injection 2.5 mg  2.5 mg IntraVENous Q6H PRN    LORazepam (ATIVAN) tablet 1 mg  1 mg Oral TID PRN    senna (SENOKOT) tablet 17.2 mg  2 Tab Oral DAILY    polyethylene glycol (MIRALAX) packet 17 g  17 g Oral Q12H    calcium carbonate (TUMS) chewable tablet 200 mg [elemental]  200 mg Oral Q6H PRN    fentaNYL (DURAGESIC) 25 mcg/hr patch 1 Patch  1 Patch TransDERmal Q72H    naloxone (NARCAN) injection 0.5 mg  0.5 mg IntraVENous PRN    ondansetron (ZOFRAN) injection 4 mg  4 mg IntraVENous Q6H PRN    pantoprazole (PROTONIX) tablet 40 mg  40 mg Oral ACB    sodium chloride (NS) flush 5-10 mL  5-10 mL IntraVENous Q8H    sodium chloride (NS) flush 5-10 mL  5-10 mL IntraVENous PRN    oxybutynin chloride XL (DITROPAN XL) tablet 5 mg  5 mg Oral DAILY    buPROPion SR (WELLBUTRIN SR) tablet 150 mg  150 mg Oral BID        Review of Systems:  Pertinent items are noted in HPI.     Objective:     Patient Vitals for the past 8 hrs:   Weight   17 0441 74.8 kg (164 lb 12.8 oz)       Temp (24hrs), Av °F (36.7 °C), Min:97.6 °F (36.4 °C), Max:98.4 °F (36.9 °C)      701 - 1900  In:  [I.V.:]  Out: -     Physical Exam:  Alert  Lungs decreased BS at bases  Heart regular  Abdomen soft  Ext no edema    Labs:    Recent Results (from the past 24 hour(s))   CBC W/O DIFF    Collection Time: 05/06/17 10:51 AM   Result Value Ref Range    WBC 17.9 (H) 3.6 - 11.0 K/uL    RBC 2.57 (L) 3.80 - 5.20 M/uL    HGB 8.0 (L) 11.5 - 16.0 g/dL    HCT 25.0 (L) 35.0 - 47.0 %    MCV 97.3 80.0 - 99.0 FL    MCH 31.1 26.0 - 34.0 PG    MCHC 32.0 30.0 - 36.5 g/dL    RDW 15.8 (H) 11.5 - 14.5 %    PLATELET 2 (LL) 865 - 400 K/uL   PLATELETS, ALLOCATE    Collection Time: 05/06/17 11:30 AM   Result Value Ref Range    Unit number [id number]     Blood component type PLTPH LR,2     Unit division 00     Status of unit TRANSFUSED    CBC WITH AUTOMATED DIFF    Collection Time: 05/07/17  3:52 AM   Result Value Ref Range    WBC 16.4 (H) 3.6 - 11.0 K/uL    RBC 2.56 (L) 3.80 - 5.20 M/uL    HGB 8.0 (L) 11.5 - 16.0 g/dL    HCT 25.1 (L) 35.0 - 47.0 %    MCV 98.0 80.0 - 99.0 FL    MCH 31.3 26.0 - 34.0 PG    MCHC 31.9 30.0 - 36.5 g/dL    RDW 15.8 (H) 11.5 - 14.5 %    PLATELET 21 (LL) 780 - 400 K/uL    NEUTROPHILS 82 (H) 32 - 75 %    LYMPHOCYTES 8 (L) 12 - 49 %    MONOCYTES 8 5 - 13 %    EOSINOPHILS 2 0 - 7 %    BASOPHILS 0 0 - 1 %    ABS. NEUTROPHILS 13.5 (H) 1.8 - 8.0 K/UL    ABS. LYMPHOCYTES 1.3 0.8 - 3.5 K/UL    ABS. MONOCYTES 1.3 (H) 0.0 - 1.0 K/UL    ABS. EOSINOPHILS 0.3 0.0 - 0.4 K/UL    ABS. BASOPHILS 0.0 0.0 - 0.1 K/UL    DF SMEAR SCANNED      RBC COMMENTS POLYCHROMASIA  1+        RBC COMMENTS ANISOCYTOSIS  1+       METABOLIC PANEL, COMPREHENSIVE    Collection Time: 05/07/17  3:52 AM   Result Value Ref Range    Sodium 135 (L) 136 - 145 mmol/L    Potassium 3.7 3.5 - 5.1 mmol/L    Chloride 103 97 - 108 mmol/L    CO2 21 21 - 32 mmol/L    Anion gap 11 5 - 15 mmol/L    Glucose 87 65 - 100 mg/dL    BUN 9 6 - 20 MG/DL    Creatinine 0.57 0.55 - 1.02 MG/DL    BUN/Creatinine ratio 16 12 - 20      GFR est AA >60 >60 ml/min/1.73m2    GFR est non-AA >60 >60 ml/min/1.73m2    Calcium 6.8 (L) 8.5 - 10.1 MG/DL    Bilirubin, total 0.4 0.2 - 1.0 MG/DL    ALT (SGPT) 18 12 - 78 U/L    AST (SGOT) 32 15 - 37 U/L    Alk. phosphatase 88 45 - 117 U/L    Protein, total 6.4 6.4 - 8.2 g/dL    Albumin 1.9 (L) 3.5 - 5.0 g/dL    Globulin 4.5 (H) 2.0 - 4.0 g/dL    A-G Ratio 0.4 (L) 1.1 - 2.2     MAGNESIUM    Collection Time: 05/07/17  3:52 AM   Result Value Ref Range    Magnesium 1.4 (L) 1.6 - 2.4 mg/dL       Assessment:     Principal Problem/plan:  Sarcomatoid carcinoma; stage IV; treatment per Dr Giovana Ferreira, cisplatin held for low plts  Femur fracture, pathologic; s/p repair, XRT  Pain controlled on PCA and duragesic,   Hypercalcemia s/p zometa, IVF, Ca normalized; Thrombocytopenia severe, her plt dropped from 252 to 7 within 4-5 days, HIT is pending  ( 4 T's for HIT is low risk) ; she is s/p transfusion with good response which makes ITP less likely, will review smear to r/o TTP, check ldh and haptolgobin, chemotherapy induced ?  Medication induced (she is off zosyn)  Low plt due to tumor involving marrow, new marrow d/o ? ( mds /leukemia? ); plts are 21 k today, no bleeding   Depression on lexapro/wellbutrin  VTE of malignancy, off anticoagulation for thrombocytopenia         Plan:     See above

## 2017-05-07 NOTE — PROGRESS NOTES
Bedside shift change report given to Lincoln Shaffer RN (oncoming nurse) by Romel Light RN (offgoing nurse). Report included the following information SBAR, Kardex, Intake/Output, MAR and Recent Results.

## 2017-05-07 NOTE — PROGRESS NOTES
Bedside and Verbal shift change report given to Bryan Rodriguez and Camila Peña RN (oncoming nurse) by Flores Clement RN (offgoing nurse). Report included the following information SBAR, Kardex, MAR and Recent Results.

## 2017-05-08 NOTE — PROGRESS NOTES
Spoke to Elis Pablo RN in Dr. Rosmery Urbina office discussing that after reading Dr. Rosmery Urbina note that a midline will not have adequate lumens to meet the patient's needs. Requested order for a PICC line. Elis Pablo RN returned call with verbal order from Dr. Mela Garcia for placement of a bedside PICC line.

## 2017-05-08 NOTE — PROGRESS NOTES
Bedside shift change report given to [facility] S Brenda Velez (oncoming nurse) by Edie Mazariegos (offgoing nurse). Report included the following information SBAR, MAR, Recent Results and Med Rec Status. 1703: Md changed order to picc line and patient will like daughter to be in the room before the procedure is done.

## 2017-05-08 NOTE — PROGRESS NOTES
Problem: Mobility Impaired (Adult and Pediatric)  Goal: *Acute Goals and Plan of Care (Insert Text)  Physical Therapy Goals  Weekly re-assessment 5/8/2017  1. Patient will move from supine to sit and sit to supine and roll side to side in bed with supervisional assistance/contact guard assist within 7 day(s). 2. Patient will transfer from bed to chair and chair to bed with contact guard assist using the least restrictive device within 7 day(s). 3. Patient will perform sit to stand with supervision/set-up within 7 day(s). 4. Patient will ambulate with minimal assistance/contact guard assist for 25 feet with the least restrictive device within 7 day(s). 5. Patient will improve Tinetti score 4+ points, decreasing fall risk, within 7 day(s). Weekly re-assessment 5/1/2017  1. Patient will move from supine to sit and sit to supine and roll side to side in bed with modified independent within 7 day(s). 2. Patient will transfer from bed to chair and chair to bed with supervsion assist using the least restrictive device within 7 day(s). 4. Patient will ambulate with supervision assist for 25 feet with the least restrictive device within 7 day(s). 5. Patient will improve Tinetti score 4+ points, decreasing fall risk, within 7 day(s). Initiated 4/24/2017  1. Patient will move from supine to sit and sit to supine and roll side to side in bed with minimal assistance/contact guard assist within 7 day(s). MET  2. Patient will transfer from bed to chair and chair to bed with minimal assistance/contact guard assist using the least restrictive device within 7 day(s). MET  3. Patient will perform sit to stand with supervision/set-up within 7 day(s). MET  4. Patient will ambulate with minimal assistance/contact guard assist for 25 feet with the least restrictive device within 7 day(s). 5. Patient will improve Tinetti score 4+ points, decreasing fall risk, within 7 day(s).    PHYSICAL THERAPY TREATMENT: WEEKLY REASSESSMENT  Patient: Gucci Jimenez (10 y.o. female)  Date: 5/8/2017  Diagnosis: Altered mental status  Hypercalcemia Hypercalcemia       Precautions: TTWB      ASSESSMENT:  Patient received in bed and very willing to work with therapy. Less complaint of pain until sitting EOB and knee more flexed. Did require increased assist with bed mobility, transfers and gait since last mobilized with therapy. Able to take a few steps to the chair maintaining NWB of RLE. Left up in chair and safe for nursing staff to assist back to bed later. Encouraged and educated on exercises for right knee and written on the board. Continue to recommend SNF for rehab especially now with decline since last able to participate. Patient's progression toward goals since last assessment: goals revised as indicated       PLAN:  Goals have been updated based on progression since last assessment. Patient continues to benefit from skilled intervention to address the above impairments. Continue to follow the patient 5 times a week to address goals. Planned Interventions:  [X]              Bed Mobility Training             [ ]       Neuromuscular Re-Education  [X]              Transfer Training                   [ ]       Orthotic/Prosthetic Training  [X]              Gait Training                         [ ]       Modalities  [X]              Therapeutic Exercises           [ ]       Edema Management/Control  [X]              Therapeutic Activities            [X]       Patient and Family Training/Education  [ ]              Other (comment):  Discharge Recommendations: Arsen Encarnacion  Further Equipment Recommendations for Discharge: none       SUBJECTIVE:   Patient stated I do feel better. I know my name now. Nonnie Home      OBJECTIVE DATA SUMMARY:   Critical Behavior:  Neurologic State: Alert, Appropriate for age  Orientation Level: Oriented X4  Cognition: Appropriate decision making, Appropriate for age attention/concentration, Appropriate safety awareness, Follows commands  Safety/Judgement: Awareness of environment     Strength:   Strength: Generally decreased, functional           Functional Mobility Training:  Bed Mobility:     Supine to Sit: Minimum assistance; Additional time     Scooting: Contact guard assistance        Transfers:  Sit to Stand: Additional time;Assist x1;Minimum assistance  Stand to Sit: Contact guard assistance  Stand Pivot Transfers: Moderate assistance                          Balance:  Sitting: Intact  Standing: Impaired; With support  Standing - Static: Fair  Standing - Dynamic : Fair  Ambulation/Gait Training:  Distance (ft): 2 Feet (ft)  Assistive Device: Gait belt;Walker, rolling  Ambulation - Level of Assistance: Moderate assistance        Gait Abnormalities: Antalgic;Decreased step clearance  Right Side Weight Bearing: Non-weight bearing        Stance: Right decreased  Speed/Penny: Pace decreased (<100 feet/min)  Step Length: Left shortened   Therapeutic Exercises: Ankle pumps, quads and ham isometric, assisted heel slides and assisted SLR.   Pain:  Pain Scale 1: Numeric (0 - 10)  Pain Intensity 1: 0      After treatment:   [X]  Patient left in no apparent distress sitting up in chair  [ ]  Patient left in no apparent distress in bed  [X]  Call bell left within reach  [X]  Nursing notified  [ ]  Caregiver present  [ ]  Bed alarm activated      COMMUNICATION/COLLABORATION:   The patients plan of care was discussed with: Registered Nurse and Piero 76, PT   Time Calculation: 37 mins

## 2017-05-08 NOTE — PROGRESS NOTES
The patient's daughter has arrived and upon arrival to the room,  patient and her daughter wanted to continue to talk privately. FRENCH Linn to call once again when timing is best. Informed the patient and daughter that we would like to start the procedure by 200.

## 2017-05-08 NOTE — PROGRESS NOTES
Chief Complaint - follow-up and management of  Lung cancer       Exam  Gen Moderate distress; not confused; HEENT No mucositis; no thrush   Nodes No supraclavicular or cervical adenopathy   Chest / line sites No inflammation   Lungs Clear to auscultation   CV RRR   Abd Non-tender   Ext No edema on L; R leg swollen and tender in distal femur and R knoww   Skin No rashes   Neuro Awake and alert   Other    Time-based care: [] 25-35 min    [] > 35 mi     (Total time with >50% spent counseling/coordination)  Discussed with the following:  []     []  Nursing Staff  [] Consultant    []  Family   []  Other:  Active Medical Problems  Sarcomatoid carcinomoma of the lung metastatic to the R femur s/p repair  Hypercalcemia  Metabolic encephalopathy  Hx of pulmonary embolism on life-long anticoagulation  Consumptive coagulopathy or bone marrow invasion by tumor (pancytopenia)  Hypoxia  Severe refractory pain R leg      Interim History and Assessment   She has multiple problems that are beginning to seem insurmountable. Her pain is OK, but she wants to go home, so we'll try switching to fentanyl and Roxanol. Start with increase patch to 125 and stop basal.  She is needing platelet transfusions and likely will need red cells tomorrow, so if we send her home it will be hard to monitor and treat her, so that should only be done with a transition to hospice. She asked about immunotherapy. We could try nivolumab and see if there is any response; I doubt there will be. We'll place a midline for pca and transfusions and I'll re-evaluate her in the am and discuss with pt and daughter. Calcium under control. Current medications, progress notes, vital signs, radiology, and labs reviewed.     Merissa Roberts MD

## 2017-05-08 NOTE — PROGRESS NOTES
Bedside and Verbal shift change report given to Aracely Toth and Suad Mena RN (oncoming nurse) by Gila Bell RN (offgoing nurse). Report included the following information SBAR, Kardex, MAR and Recent Results.

## 2017-05-08 NOTE — PROGRESS NOTES
Bedside shift change report given to [facility] (oncoming nurse) by Gretchen Mcclellan (offgoing nurse). Report included the following information {SBAR REPORTS TOCA:[id number]}.

## 2017-05-08 NOTE — PROCEDURES
PICC Placement Note. Order received. PICC procedure, risks, benefits and complications reviewed with the patient and her daughter. Questions were answered to satisfaction. Elmo Araujo signed informed consent for bedside placement of PICC line. PRE-PROCEDURE VERIFICATION  Correct Procedure: yes  Correct Site:  yes  Temperature: Temp: 98.3 °F (36.8 °C), Temperature Source: Temp Source: Oral  Recent Labs      05/08/17   0521   BUN  6   CREA  0.55   PLT  9*   WBC  16.7*     Allergies: Tetracycline and Lortab [hydrocodone-acetaminophen]  Education materials, including PICC Booklet, for PICC Care given to patient: yes. See Patient Education activity for further details. PROCEDURE DETAIL  A double lumen PICC line was started for vascular access and desire for reliable access. The following documentation is in addition to the PICC properties in the lines/airways flowsheet : The vein was accessed with a 20g IV catheter using ultrasound guidance and a guidewire was easily thread. Modified Seldinger Technique was used to thread the PICC and the tip direction was determined with a PICC tip  device  Lot #: [id number]  Was xylocaine 1% used intradermally:  yes  Catheter Length: 42 (cm) placed between the hub and \"0\" charisma  Vein Selection for PICC:left cephalic  Central Line Bundle followed yes  Complication Related to Insertion: none    The placement was verified by ECG technology:  Waveform placed on the patient's paper chart to document that the tip is in the superior vena cava. Bedside report given to Elena Wells is okay to use.

## 2017-05-08 NOTE — PROGRESS NOTES
Patient was reviewed in rounds. Patient is not ready for discharge at this time. At time of discharge patient to transition to [facility]. CM sent updates to [facility] for review via Arsen Nery. CM will continue to follow to assist with disposition needs as they arise.     NITZA Murry/JUAN ANTONIO  2:05 PM

## 2017-05-08 NOTE — PROGRESS NOTES
Arrived to patient room to place bedside PICC line. Patient would like for daughter to arrive to discuss the procedure. FRENCH Linn to notify VAT when the daughter arrives.

## 2017-05-09 NOTE — PROGRESS NOTES
Physical Therapy  Attempted to see patient but nursing preparing to hand platelets. Patient indicated not wanting to work with therapy today but did state she is working on the LE exercises posted on her board. Reviewed them with her. Will follow up tomorrow.   Braulio Santiago, PT

## 2017-05-09 NOTE — PROGRESS NOTES
Patient turned on call light to ask to have radiation therapy cancelled. She says she is dreading it, and it won't help. She thought today was her last treatment. When I called to notify the radiation techs they said she could be treated tomorrow. I asked patient if she wanted to be treated tomorrow, and she said no, she was finished with radiation.   Yancy Gonzalez to notify Radiation Oncologist.

## 2017-05-09 NOTE — PROGRESS NOTES
Contacted Dr Sweta Abarca about patient's critical lab values. Platelets at 6,728 and Calcium at 6.3. Informed Dr Sweta Abarca that the patient was refusing to go to radiation. No new orders received.

## 2017-05-09 NOTE — PROGRESS NOTES
Chief Complaint - follow-up and management of  Lung cancer       Exam  Gen Moderate distress; not confused; HEENT No mucositis; no thrush   Nodes No supraclavicular or cervical adenopathy   Chest / line sites No inflammation   Lungs Clear to auscultation   CV RRR   Abd Non-tender   Ext No edema on L; R leg swollen and tender in distal femur and R knoww   Skin No rashes   Neuro Awake and alert   Other    Time-based care: [] 25-35 min    [] > 35 mi     (Total time with >50% spent counseling/coordination)  Discussed with the following:  []     []  Nursing Staff  [] Consultant    []  Family   []  Other:  Active Medical Problems  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 I upped her fentanyl patch to 200 mcg. It will take another day or two to get up to full speed and then we can perhaps get her off the PCA. She did not go to XRT to finish today because of how much it hurts to lay on the gantry. I suggested she try again tomorrow. Her counts were very low and we transfused her. Whether this is consumption like DIC or poor production due to marrow infiltration is unclear. We may do more work-up. Although she has very aggressive disease and a bevy of problems, I offered her a trial of nivolumab (immunotherapy) for her lung cancer. She did not tolerate or respond to chemotherapy. Immunotherapy is FDA-approved as second line therapy for non-small cell lung cancer and she has a PDL-1 score of 50% which is quite high and may indicate a reasonable chance for her to respond. She is trying to decide whether to try this or take the hospice pathway.           Current medications, progress notes, vital signs, radiology, and labs reviewed.     Bin Kahn MD

## 2017-05-09 NOTE — PROGRESS NOTES
Occupational Therapy Note  5/9/2017    Chart reviewed. Noted pt receiving platelets and PRBCs d/t low critical lab values. Will hold at this time and follow-up once pt medically stable.     Edith Veronica, OTR/L

## 2017-05-09 NOTE — PROGRESS NOTES
Bedside shift change report given to [address] (oncoming nurse) by Alex Raphael (offgoing nurse). Report included the following information SBAR.

## 2017-05-09 NOTE — ADT AUTH CERT NOTES
Utilization Review           Systemic or Infectious Condition GRG - Care Day 15 (5/8/2017) by Rikki Figueroa RN        Review Status Review Entered       Completed 5/9/2017       Details              Care Day: 15 Care Date: 5/8/2017 Level of Care: Telemetry       Guideline Day 3        Level Of Care       (X) * Activity level acceptable              Clinical Status       ( ) * Hemodynamic stability       (X) * Respiratory status acceptable       (X) * Neurologic status acceptable       (X) * Temperature status acceptable       ( ) * No infection, or status acceptable       (X) * No neutropenia, or status acceptable       ( ) * Special infection or injury situations absent       ( ) * Electrolyte status acceptable       ( ) * General Discharge Criteria met              Interventions       (X) * Intake acceptable       ( ) * No inpatient interventions needed                                   * Milestone              Additional Notes       Active Medical Problems       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 Her pain is OK, but she wants to go home, so we'll try switching to fentanyl and Roxanol. Start with increase patch to 125 and stop basal.       She is needing platelet transfusions and likely will need red cells tomorrow, so if we send her home it will be hard to monitor and treat her, so that should only be done with a transition to hospice.       She asked about immunotherapy. We could try nivolumab and see if there is any response; I doubt there will be.        We'll place a midline for pca and transfusions and I'll re-evaluate her in the am and discuss with pt and daughter.       Calcium under control.       Midline placed       NS@ 125ml/hr IV, Dilaudid PCA       WBC 16.7, H/H 7.3/23.4, Platelets 9, Calcium 6.2, Total protein 6.1, Albumin 1.7       Other orders: Midline placement by vascular access team, regular diet, SCDs           Systemic or Infectious Condition GRG - Care Day 14 (5/7/2017) by John Hernandez RN        Review Status Review Entered       Completed 5/8/2017       Details              Care Day: 14 Care Date: 5/7/2017 Level of Care: Telemetry       Guideline Day 3        Level Of Care       (X) * Activity level acceptable              Clinical Status       ( ) * Hemodynamic stability       (X) * Respiratory status acceptable       (X) * Neurologic status acceptable       (X) * Temperature status acceptable       ( ) * No infection, or status acceptable       (X) * No neutropenia, or status acceptable       ( ) * Special infection or injury situations absent       ( ) * Electrolyte status acceptable       ( ) * General Discharge Criteria met              Interventions       (X) * Intake acceptable       ( ) * No inpatient interventions needed                                   * Milestone              Additional Notes       Pt is better today, is anxious, no bleeding; pain is controlled,       Principal Problem/plan:       Sarcomatoid carcinoma; stage IV; treatment per Dr No Begum, cisplatin held for low plts       Femur fracture, pathologic; s/p repair, XRT       Pain controlled on PCA and duragesic,        Hypercalcemia s/p zometa, IVF, Ca normalized;       Thrombocytopenia severe, her plt dropped from 252 to 7 within 4-5 days, HIT is pending ( 4 T's for HIT is low risk) ; she is s/p transfusion with good response which makes ITP less likely, will review smear to r/o TTP, check ldh and haptolgobin, chemotherapy induced ?  Medication induced (she is off zosyn) Low plt due to tumor involving marrow, new marrow d/o ? ( mds /leukemia? ); plts are 21 k today, no bleeding        Depression on lexapro/wellbutrin       VTE of malignancy, off anticoagulation for thrombocytopenia       T 98.6 P 101 RR 18 /68 spO2 96%       Meds: NS@ 125ml/hr IV, Dilaudid PCA       Other orders: regular diet, SCDs           Systemic or Infectious Condition GRG - Care Day 13 (5/6/2017) by Marlen Guerin RN        Review Status Review Entered       Completed 5/8/2017       Details              Care Day: 13 Care Date: 5/6/2017 Level of Care: Telemetry       Guideline Day 3        Level Of Care       (X) * Activity level acceptable              Clinical Status       ( ) * Hemodynamic stability       (X) * Respiratory status acceptable       (X) * Neurologic status acceptable       (X) * Temperature status acceptable       ( ) * No infection, or status acceptable       (X) * No neutropenia, or status acceptable       ( ) * Special infection or injury situations absent       ( ) * Electrolyte status acceptable       ( ) * General Discharge Criteria met              Interventions       (X) * Intake acceptable       ( ) * No inpatient interventions needed                                   * Milestone              Additional Notes       Pt is tearful, crying, wants to go home; states that her pain is controlled, no cp or sob       T 98.7 P 112 RR 18 /65 spO2 94%       Lungs decreased BS at bases       Principal Problem/plan:       Sarcomatoid carcinoma; stage IV; treatment per Dr Giovana Logan       Femur fracture, pathologic; s/p repair, XRT       Pain controlled on PCA and duragesic,        Hypercalcemia s/p zometa, IVF, Ca normalized;       Thrombocytopenia severe, s/p transfusion 2 days ago, plts were yesterday 17K, today are pending; transfuse if < 7 or bleeding, YANETH pending; likely 2 to zosyn;        Depression on lexapro/wellbutrin       VTE of malignancy, off anticoagulation for thrombocytopenia       Labs: WBC 17.9 H/H 8.0/25.0, Platelets 2       Other orders: transfuse platelets       Meds: NS@ 125ml/hr IV, Dilaudid PCA

## 2017-05-09 NOTE — PROGRESS NOTES
Bedside shift change report given to Jerome (oncoming nurse) by Teddy Dinero (offgoing nurse). Report included the following information SBAR, Kardex, MAR and Recent Results.

## 2017-05-10 NOTE — PROGRESS NOTES
Spiritual Care Partner Volunteer visited patient in [address] on 5/10/17. Documented by:  Olga Jones M.Div.    Paging Service [phone])

## 2017-05-10 NOTE — PROGRESS NOTES
Problem: Mobility Impaired (Adult and Pediatric)  Goal: *Acute Goals and Plan of Care (Insert Text)  Physical Therapy Goals  Weekly re-assessment 5/8/2017  1. Patient will move from supine to sit and sit to supine and roll side to side in bed with supervisional assistance/contact guard assist within 7 day(s). 2. Patient will transfer from bed to chair and chair to bed with contact guard assist using the least restrictive device within 7 day(s). 3. Patient will perform sit to stand with supervision/set-up within 7 day(s). 4. Patient will ambulate with minimal assistance/contact guard assist for 25 feet with the least restrictive device within 7 day(s). 5. Patient will improve Tinetti score 4+ points, decreasing fall risk, within 7 day(s). Weekly re-assessment 5/1/2017  1. Patient will move from supine to sit and sit to supine and roll side to side in bed with modified independent within 7 day(s). 2. Patient will transfer from bed to chair and chair to bed with supervsion assist using the least restrictive device within 7 day(s). 4. Patient will ambulate with supervision assist for 25 feet with the least restrictive device within 7 day(s). 5. Patient will improve Tinetti score 4+ points, decreasing fall risk, within 7 day(s). Initiated 4/24/2017  1. Patient will move from supine to sit and sit to supine and roll side to side in bed with minimal assistance/contact guard assist within 7 day(s). MET  2. Patient will transfer from bed to chair and chair to bed with minimal assistance/contact guard assist using the least restrictive device within 7 day(s). MET  3. Patient will perform sit to stand with supervision/set-up within 7 day(s). MET  4. Patient will ambulate with minimal assistance/contact guard assist for 25 feet with the least restrictive device within 7 day(s). 5. Patient will improve Tinetti score 4+ points, decreasing fall risk, within 7 day(s).    PHYSICAL THERAPY TREATMENT  Patient: [place] IJEOMA Varma (58 y.o. female)  Date: 5/10/2017  Diagnosis: Altered mental status  Hypercalcemia Hypercalcemia       Precautions: TTWB      ASSESSMENT:  Patient received in bed and most agreeable to therapy. Focused on gait today with rolling walker. Continues to require assist with mobilizing RLE on and off the bed. Sit to stand with minimal assist.  Able to ambulate with slow gait maintaining NWB on RLE. With increased distance today but very fatigued last 2 feet requiring increased support and sitting end of bed and resting before moving to head of bed. Did provide thera-band for UE exercise and reviewed LE exercise. Slow progress but also patient with very low platelets. Progression toward goals:  [ ]    Improving appropriately and progressing toward goals  [X]    Improving slowly and progressing toward goals  [ ]    Not making progress toward goals and plan of care will be adjusted       PLAN:  Patient continues to benefit from skilled intervention to address the above impairments. Continue treatment per established plan of care. Discharge Recommendations:  [address] and To Be Determined  Further Equipment Recommendations for Discharge:  TBD pending discharge       SUBJECTIVE:   Patient stated I do get nervous about this.       OBJECTIVE DATA SUMMARY:   Critical Behavior:  Neurologic State: Alert, Confused (periodic confusion)  Orientation Level: Oriented X4  Cognition: Decreased attention/concentration  Safety/Judgement: Awareness of environment  Functional Mobility Training:  Bed Mobility:     Supine to Sit: Minimum assistance; Additional time  Sit to Supine: Minimum assistance           Transfers:  Sit to Stand: Minimum assistance;Contact guard assistance;Assist x2  Stand to Sit: Contact guard assistance                             Balance:  Sitting: Intact  Standing: Impaired; With support  Standing - Static: Fair  Standing - Dynamic : Poor  Ambulation/Gait Training:  Distance (ft): 8 Feet (ft)  Assistive Device: Gait belt;Walker, rolling  Ambulation - Level of Assistance:  Moderate assistance        Gait Abnormalities: Antalgic;Decreased step clearance  Right Side Weight Bearing: Non-weight bearing        Stance: Right decreased  Speed/Penny: Pace decreased (<100 feet/min)  Step Length: Left shortened                               Pain:  Pain Scale 1: Numeric (0 - 10)  Pain Intensity 1: 0         After treatment:   [ ]    Patient left in no apparent distress sitting up in chair  [X]    Patient left in no apparent distress in bed  [X]    Call bell left within reach  [X]    Nursing notified  [ ]    Caregiver present  [ ]    Bed alarm activated      COMMUNICATION/COLLABORATION:   The patients plan of care was discussed with: Occupational Therapist and Registered Nurse     Luis Garcia, PT   Time Calculation: 25 mins

## 2017-05-10 NOTE — PROGRESS NOTES
Patient reviewed in rounds. CM informed that patient continues to be not stable for discharge at this time. Patient is currently reviewing options of Immunotherapy or Hospice at this time. CM will continue to follow and assist with disposition needs as they arise. CM had opportunity to meet with patient and daughter. Patient requested a list of personal care agencies. CM provided them with requested resources.     NITZA Cheung/JUAN ANTONIO  11:40 AM

## 2017-05-10 NOTE — PROGRESS NOTES
Occupational Therapy    Completed chart reviewed. Noted platlettes at 18 K/uL. Will hold at this time and follow-up once pt medically stable. 1232  Addendum: Discussed patient with PT who worked with patient this AM.  Nursing cleared for therapy. PT expressed patient with desire for BUE exercises. Will attempt to follow up this afternoon as schedule allows for BUE strengthening exercises. 1505: Discussed patient with nursing who cleared patient for therapy however patient just left floor for radiation. Nursing reporting patient with good day today and good motivation for therapy.  Will continue to follow for therapy.      Thank you,    Katelynn Espinal OTR/L

## 2017-05-10 NOTE — PROGRESS NOTES
Spoke with Martine Alonzo, Dr. Stephen Vaz nurse, and informed her that the nivolumab would not be available until tomorrow (5/11/17) because it had to be ordered from the pharmacy. Hugo Waterman stated she would let Dr. Luana Garcia know.

## 2017-05-10 NOTE — PROGRESS NOTES
Bedside and Verbal shift change report given to Altagracia RN (oncoming nurse) by Vijay Rutledge RN (offgoing nurse). Report included the following information SBAR, Kardex, Procedure Summary, Intake/Output, MAR, Recent Results and Med Rec Status.

## 2017-05-10 NOTE — PROGRESS NOTES
Bedside shift change report given to Miladis Burrell RN (oncoming nurse) by Keyona Clements RN (offgoing nurse). Report included the following information SBAR and MAR.     0930- Informed Dr. Troy Pina that patient received Potassium repletion at 0700 this morning. She received 2 bags of 10mEq IV Potassium. Per MD, still administer the additional 4 ordered and the PO potassium. 1242- Paged Radiation to inform that patient has returned from [place] and would be available for treatment.

## 2017-05-11 NOTE — PROGRESS NOTES
Pharmacist Note - Vancomycin Dosing    Consult provided for this 61 y.o. female for indication of HAP. Antibiotic regimen(s): Vanc + Levaquin + Zosyn    Recent Labs      17   0201  05/10/17   0154  17   1031   WBC  16.9*  16.3*  15.2*   CREA  0.76  0.40*  0.44*   BUN  7  3*  4*     Frequency of BMP: daily x 3  Height: 158 cm  Weight: 77 kg  Est CrCl: ~77 ml/min   Temp (24hrs), Av.7 °F (36.5 °C), Min:97.5 °F (36.4 °C), Max:98.1 °F (36.7 °C)    Goal trough = 15 - 20 mcg/mL    Therapy will be initiated with a loading dose of 1750 mg IV x 1 to be followed by a maintenance dose of 1000 mg IV every 12 hours. Pharmacy to follow patient daily and order levels / make dose adjustments as appropriate.

## 2017-05-11 NOTE — PROGRESS NOTES
Hospitalist Progress Note  Scooter Galvez MD  Office: [phone]        Date of Service:  2017  NAME:  Francois Schafer  :  1960  MRN:  [id number]      Admission Summary: This is a 58-year-old woman with a past medical history significant for lung cancer, depression,   thromboembolism on Xarelto. She was initially admitted to the hospital on the hospitalist service because of hypercalcemia and acute delirium related to the hypercalcemia. We were consulted for sinus tachycardia. Interval history / Subjective:    I was called to see patient for hypoxia. She has been having low oxygen issues through the day,the primary attending tried lasix. She continued to be hypoxic on 4lpm.    RRT was activated for same. She was put on mask and her sat went up to mid 90s but she is unable to tolerate it, she is claustrophobic. She has then need to be on high flow oxygen which she has to be transferred to [facility]. Assessment & Plan:     Acute hypoxic respiratory failure,metastatic dz vs pneumonia,HAP  -CT chest , extensive pleural mets,pulmonary nodule  -CXR diffuse bilateral airspace disease and moderate right pleural effusion  -She is not sedated  -Unable to tolerate mask, move to imcu for high flow.  -Start empiric antibiotics with zosyn and vancomycin,she is already on levaquin  -D dimer was normal . Blood culture drawn  -Discussed with Dr Danette tejeda,no CTA. -Try lasix 40 mg x1. 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 Primary attending managing this. Hypercalcemia: Resolved    Leucocytosis: This may be reactive. H/o PE:  She was on chronic anticoagulation but currently held due to thrombocytopenia.  -Discussed with Dr Violet Trammell need for CTA as she can't be anticoagulated ,advised LE doppler since we can place IVC filter if positive. I have discussed plan of care with primary attending who is in agreement with the below plan. Patient with widely metastatic cancer and high risk for deterioration. Code status: Full. Patient is anxious and tearful at this time. I have talked with Dr Josiane La who would discuss code status with her   DVT prophylaxis: SCD    Care Plan discussed with: Patient/Family and Nurse  Disposition: TBD, as per primary     Hospital Problems  Date Reviewed: 4/24/2017          Codes Class Noted POA    * (Principal)Hypercalcemia ICD-10-CM: G81.87  ICD-9-CM: 275.42  4/24/2017 Unknown        Altered mental status ICD-10-CM: R41.82  ICD-9-CM: 780.97  4/24/2017 Unknown                Review of Systems:   Pertinent items are noted in HPI. Vital Signs:    Last 24hrs VS reviewed since prior progress note. Most recent are:  Visit Vitals    /61    Pulse (!) 103    Temp 97.5 °F (36.4 °C)    Resp 18    Ht 5' 2\" (1.575 m)    Wt 76.5 kg (168 lb 10.4 oz)    SpO2 (!) 86%    Breastfeeding No    BMI 30.85 kg/m2         Intake/Output Summary (Last 24 hours) at 05/11/17 1811  Last data filed at 05/11/17 5420   Gross per 24 hour   Intake             1289 ml   Output                0 ml   Net             1289 ml        Physical Examination:             Constitutional:  Alert and oriented x3,anxious and tearful    ENT:  Oral mucous moist, oropharynx benign. Neck supple,    Resp:  Coarse breath sounds   CV:  Regular rhythm, no murmurs, gallops, rubs    GI:  Soft, non distended, non tender. normoactive bowel sounds, no hepatosplenomegaly     Musculoskeletal:  ++ edema    Neurologic:  Moves all extremities. Alert and oriented.             Data Review:          Labs:     Recent Labs      05/11/17   0201  05/10/17   0154   WBC  16.9*  16.3*   HGB  9.9*  8.3*   HCT  30.7*  25.8* PLT  15*  18*     Recent Labs      05/11/17   0201  05/10/17   0154  05/09/17   1031   NA  135*  141  137   K  4.1  2.7*  3.0*   CL  104  108  104   CO2  24  24  27   BUN  7  3*  4*   CREA  0.76  0.40*  0.44*   GLU  91  92  119*   CA  6.7*  5.7*  6.3*     Recent Labs      05/11/17   0201  05/10/17   0154  05/09/17   1031   SGOT  16  18  22   ALT  11*  11*  13   AP  76  63  72   TBILI  0.5  0.6  0.5   TP  6.1*  5.0*  5.6*   ALB  1.7*  1.5*  1.6*   GLOB  4.4*  3.5  4.0     Recent Labs      05/10/17   0154   INR  1.5*   PTP  15.6*   APTT  35.8*      No results for input(s): FE, TIBC, PSAT, FERR in the last 72 hours. Lab Results   Component Value Date/Time    Folate 15.3 05/02/2016 09:08 AM      No results for input(s): PH, PCO2, PO2 in the last 72 hours. No results for input(s): CPK, CKNDX, TROIQ in the last 72 hours.     No lab exists for component: CPKMB  Lab Results   Component Value Date/Time    Cholesterol, total 197 10/26/2016 08:49 AM    HDL Cholesterol 58 10/26/2016 08:49 AM    LDL, calculated 122 10/26/2016 08:49 AM    Triglyceride 86 10/26/2016 08:49 AM    CHOL/HDL Ratio 5.4 08/25/2009 12:05 PM     Lab Results   Component Value Date/Time    Glucose (POC) 105 05/11/2017 03:38 PM    Glucose (POC) 120 04/24/2017 03:55 AM     Lab Results   Component Value Date/Time    Color YELLOW/STRAW 04/24/2017 04:08 AM    Appearance CLEAR 04/24/2017 04:08 AM    Specific gravity 1.018 04/24/2017 04:08 AM    pH (UA) 6.0 04/24/2017 04:08 AM    Protein NEGATIVE  04/24/2017 04:08 AM    Glucose NEGATIVE  04/24/2017 04:08 AM    Ketone TRACE 04/24/2017 04:08 AM    Bilirubin NEGATIVE  04/09/2017 01:17 PM    Urobilinogen 1.0 04/24/2017 04:08 AM    Nitrites NEGATIVE  04/24/2017 04:08 AM    Leukocyte Esterase NEGATIVE  04/24/2017 04:08 AM    Epithelial cells FEW 04/24/2017 04:08 AM    Bacteria 1+ 04/24/2017 04:08 AM    WBC 0-4 04/24/2017 04:08 AM    RBC 0-5 04/24/2017 04:08 AM         Medications Reviewed:     Current Facility-Administered Medications   Medication Dose Route Frequency    furosemide (LASIX) 10 mg/mL injection        [START ON 5/12/2017] fentaNYL (DURAGESIC) 100 mcg/hr patch 1 Patch  1 Patch TransDERmal Q72H    HYDROmorphone (PF) (DILAUDID) injection 1 mg  1 mg IntraVENous Q4H PRN    levoFLOXacin (LEVAQUIN) 500 mg in D5W IVPB  500 mg IntraVENous Q24H    iopamidol (ISOVUE-370) 76 % injection 100 mL  100 mL IntraVENous RAD ONCE    sodium chloride 0.9 % bolus infusion 100 mL  100 mL IntraVENous RAD ONCE    sodium chloride (NS) flush 10 mL  10 mL IntraVENous RAD ONCE    potassium chloride SR (KLOR-CON 10) tablet 10 mEq  10 mEq Oral TID    0.9% sodium chloride infusion 250 mL  250 mL IntraVENous PRN    0.9% sodium chloride infusion 250 mL  250 mL IntraVENous PRN    sodium chloride (NS) flush 20 mL  20 mL InterCATHeter PRN    sodium chloride (NS) flush 10 mL  10 mL InterCATHeter Q24H    sodium chloride (NS) flush 10 mL  10 mL InterCATHeter PRN    sodium chloride (NS) flush 10 mL  10 mL InterCATHeter Q8H    bacitracin 500 unit/gram packet 1 Packet  1 Packet Topical PRN    0.9% sodium chloride infusion 250 mL  250 mL IntraVENous PRN    metoprolol succinate (TOPROL-XL) XL tablet 25 mg  25 mg Oral DAILY    0.9% sodium chloride infusion  25 mL/hr IntraVENous CONTINUOUS    escitalopram oxalate (LEXAPRO) tablet 10 mg  10 mg Oral DAILY    0.9% sodium chloride infusion 250 mL  250 mL IntraVENous PRN    metoprolol (LOPRESSOR) injection 2.5 mg  2.5 mg IntraVENous Q6H PRN    LORazepam (ATIVAN) tablet 1 mg  1 mg Oral TID PRN    senna (SENOKOT) tablet 17.2 mg  2 Tab Oral DAILY    polyethylene glycol (MIRALAX) packet 17 g  17 g Oral Q12H    calcium carbonate (TUMS) chewable tablet 200 mg [elemental]  200 mg Oral Q6H PRN    naloxone (NARCAN) injection 0.5 mg  0.5 mg IntraVENous PRN    ondansetron (ZOFRAN) injection 4 mg  4 mg IntraVENous Q6H PRN    pantoprazole (PROTONIX) tablet 40 mg  40 mg Oral ACB    sodium chloride (NS) flush 5-10 mL  5-10 mL IntraVENous Q8H    sodium chloride (NS) flush 5-10 mL  5-10 mL IntraVENous PRN    oxybutynin chloride XL (DITROPAN XL) tablet 5 mg  5 mg Oral DAILY    buPROPion SR (WELLBUTRIN SR) tablet 150 mg  150 mg Oral BID     ______________________________________________________________________  EXPECTED LENGTH OF STAY: 3d 7h  ACTUAL LENGTH OF STAY:          17                 Ebony Kaur MD

## 2017-05-11 NOTE — PROGRESS NOTES
TRANSFER - OUT REPORT:    Verbal report given to FRENCH Alvarenga(name) on [employer]  being transferred to ICU(unit) for urgent transfer       Report consisted of patients Situation, Background, Assessment and   Recommendations(SBAR). Information from the following report(s) SBAR, Kardex, [facility] and Recent Results was reviewed with the receiving nurse. Lines:   PICC Double Lumen 11/55/43 Left;Cephalic (Active)   Central Line Being Utilized Yes 5/11/2017 10:15 AM   Criteria for Appropriate Use Long term IV/antibiotic administration 5/11/2017 10:15 AM   Site Assessment Clean, dry, & intact 5/11/2017 10:15 AM   Phlebitis Assessment 0 5/10/2017 10:04 PM   Infiltration Assessment 0 5/10/2017 10:04 PM   Date of Last Dressing Change 05/08/17 5/11/2017 10:15 AM   Dressing Status Clean, dry, & intact 5/11/2017 10:15 AM   Action Taken Open ports on tubing capped 5/11/2017 10:15 AM   External Catheter Length (cm) 0 centimeters 5/8/2017  8:11 PM   Dressing Type Disk with Chlorhexadine gluconate (CHG) 5/11/2017 10:15 AM   Hub Color/Line Status Red;Flushed;Capped 5/11/2017 10:15 AM   Positive Blood Return (Site #1) Yes 5/11/2017 10:15 AM   Hub Color/Line Status Purple;Flushed; Infusing 5/11/2017 10:15 AM   Positive Blood Return (Site #2) Yes 5/11/2017 10:15 AM   Alcohol Cap Used Yes 5/11/2017 10:15 AM        Opportunity for questions and clarification was provided.       Patient transported with:   Registered Nurse

## 2017-05-11 NOTE — PROGRESS NOTES
1906-Pt admitted to ICU 4 from [facility]. Pt with O2 sats in lower 90's on 100% NRB. RT at bedside to place Pt on Hi-flow NC and draw ABG. Auditory crackles noted without use of stethoscope. Pt drowsy, but opens eyes to voice. Oriented x 3. MCGOWAN and follows commands. 3947- Bedside report given to Salvador Thakur RN, using [employer]. Pt now on 60 liters and 100% Hi-flow NC. ABG to be drawn around 2015. O2 sats now 95-97%. Pt anxious at times.

## 2017-05-11 NOTE — PROGRESS NOTES
STAT CTA discontinued per Dr. Yuliya Flaherty. Called CT, CT to d/c order as they had already pulled it into their system.

## 2017-05-11 NOTE — PROGRESS NOTES
Occupational Therapy    Patient continues to have critically low plates at 25. PT educated patient on completing bed level exercises only without resistance band. Will hold OT services at this time. Will continue to follow.     Thank you,    Shanti Thakkar OTR/L

## 2017-05-11 NOTE — PROGRESS NOTES
Spiritual Care Assessment/Progress Notes    Jesus White [id number]  [id number]    1960  61 y.o.  female    Patient Telephone Number: [phone] (home)   Jain Affiliation: No Gnosticism   Language: English   Extended Emergency Contact Information  Primary Emergency Contact: [address] Phone: [phone]  Relation: Daughter  Secondary Emergency Contact: Cordelia Varma  Home Phone: [phone]  Relation: Child   Patient Active Problem List    Diagnosis Date Noted    Hypercalcemia 04/24/2017    Altered mental status 04/24/2017    Femur fracture, right ([facility] [address] 04/04/2017    Malignant neoplasm of right lung ([facility] [address] 03/21/2017    S/P lobectomy of lung 11/23/2016    Hyperlipidemia 11/12/2014    Colon cancer screening 01/04/2013    Basal cell cancer 03/27/2012    Pap smear for cervical cancer screening 03/27/2012    History of screening mammography 03/27/2012    Depression     Stress incontinence         Date: 5/11/2017       Level of Jain/Spiritual Activity:  []         Involved in helene tradition/spiritual practice    []         Not involved in helene tradition/spiritual practice  []         Spiritually oriented    []         Claims no spiritual orientation    []         seeking spiritual identity  []         Feels alienated from Zoroastrianism practice/tradition  []         Feels angry about Zoroastrianism practice/tradition  []         Spirituality/Zoroastrianism tradition  a resource for coping at this time.   [x]         Not able to assess due to medical condition    Services Provided Today:  []         crisis intervention    []         reading Scriptures  []         spiritual assessment    []         prayer  []         empathic listening/emotional support  []         rites and rituals (cite in comments)  []         life review     []         Zoroastrianism support  []         theological development   []         advocacy  []         ethical dialog     []         blessing  [] bereavement support    []         support to family  []         anticipatory grief support   []         help with AMD  []         spiritual guidance    []         meditation      Spiritual Care Needs  []         Emotional Support  []         Spiritual/Hoahaoism Care  []         Loss/Adjustment  []         Advocacy/Referral                /Ethics  []         No needs expressed at               this time  []         Other: (note in               comments)  [address]  []         Follow up visits with               pt/family  []         Provide materials  []         Schedule sacraments  []         Contact Community               Clergy  [x]         Follow up as needed  []         Other: (note in               comments)     Comments: Attempted Initial Spiritual Assessment in MED ONC. Patient did not appear to be interactive. Unable to assess patients needs. No family present at this time. Was not able to speak with nurse at time of visit to determine if his is the patient's normal state of being. Chaplains will follow as able and/or as needed. Clyde Box M.S., M.Div.   [address] (1984)

## 2017-05-11 NOTE — PROGRESS NOTES
Chief Complaint - follow-up and management of  Lung cancer      Pain is better controlled, no new problems over night       Exam  Gen Moderate distress; not confused; HEENT No mucositis; no thrush, alopecia    Nodes No supraclavicular or cervical adenopathy   Chest / line sites No inflammation   Lungs Decreased bs both bases   CV RRR   Abd Non-tender   Ext No edema on L; R leg swollen and tender in distal femur and R knee   Skin No rashes   Neuro Awake and alert   Other    Time-based care: [] 25-35 min    [] > 35 mi     (Total time with >50% spent counseling/coordination)  Discussed with the following:  []     []  Nursing Staff  [] Consultant    []  Family   []  Other:  Active Medical Problems  Sarcomatoid carcinomoma of the lung metastatic to the R femur s/p repair  Hypercalcemia  Metabolic encephalopathy, resolved  Hx of pulmonary embolism on life-long anticoagulation  Consumptive coagulopathy or bone marrow invasion by tumor (pancytopenia)  Hypoxia  Severe refractory pain R leg  Hypokalemia  Painful chest wall metastasis on the R thorax  Severe anemia  Severe thrombocytopenia  Extensive pleural effusion/carcinoma on the right          Interim History and Assessment   Ute Bautista has decided to proceed with immune therapy; Her PDL-1 percentage is 50. This one of the few treatments I can give her with her low counts. Will start today. .    CT shows dramatic progression of disease in the chest, pleura, and chest wall. Her pain is better controlled with 200 mcg fentanyl patch. She has her last 5/5 fraction of XRT to knee today    Her pancytopenia may be consumptive or due to poor production due to marrow infiltration; I don't think it is immune mediated. The solution is to treat the underlying disease. Serotonin release assay was negative. Continue to transfuse as needed.    Plts only 15k two days after transfusion from [facility]  No tx today         Current medications, progress notes, vital signs, radiology, and labs reviewed.     Reanna Dukes MD

## 2017-05-11 NOTE — PROGRESS NOTES
Problem: Pain  Goal: *Control of Pain  Outcome: Progressing Towards Goal  Pt currently describes pain at a 0-1/10, which is a tremendous improvement from last week's9-10/10. She is more mobile, too. She also has had 4 demands in a 12 hour shift.

## 2017-05-11 NOTE — PROGRESS NOTES
Physical Therapy  Patient continues to have very low platelet count (06M) therefor deferring out of bed activity. Explained to patient the risk of strenuous activity and recommend continuing with LE exercise in bed and active ROM exercise of UE's without using resistive bands. Will continue to follow. Appreciate any additional guidance from MD as to how much to push upright mobility while platelet count is low.   Arnoldo Carlson, PT

## 2017-05-11 NOTE — PROGRESS NOTES
Responded to RRT called for Ms Obdulio Cloud in room 615. Multiple staff members were working with Ms Obdulio Cloud. Provided compassionate presence to patient's brother who was at the bedside. Ms Obdulio Cloud became restless and stating that she just wanted to be left alone;  offered calm presence and words of encouragement but patient covered her eyes and continued to say she just wanted to have a few minutes to herself. Patient's brother stated that he thought she was feeling overwhelmed by everything that was taking place and by so many people being present. Brother shared that he had no needs at that time. Assured patient and brother of  availability for support. : Rev. Donna Davis.  Oliver Valdovinos; [facility], to contact [phone] [address] call: [phone]

## 2017-05-11 NOTE — PROGRESS NOTES
NUTRITION COMPLETE ASSESSMENT    RECOMMENDATIONS:   1. Continue regular diet without restrictions and encourage small, frequent meals, snacks and supplements if unable to take full meals. Encourage pt to participate in menu selections as well. -- Could consider appetite stimulant as discussed in rounds    2. Continue daily weights    Interventions/Plan:   Food/Nutrient Delivery: RD to follow and add snacks/supplements    Assessment:   Reason for Assessment:   [x] Reassessment     Diet:  Regular + Ensure Compact TID  Nutritionally Significant Medications: [x] Reviewed & Includes: tums q 6 hours prn; zofran q 6 hours prn; protonix daily; miralax q 12 hours   Meal Intake: Patient Vitals for the past 100 hrs:   % Diet Eaten   05/11/17 1015 0 %   05/08/17 1754 0 %   05/08/17 1400 0 %   05/07/17 1856 0 %   05/07/17 1403 0 %   05/07/17 0951 0 %     Subjective:  Pt complaining that her oxygen is making her nose itch and taking her nasal cannula off and on throughout visit. She was obviously tired during visit and forcing herself to stay awake. Objective:  Chart reviewed, discussed with RN and team during interdisciplinary rounds. Pt admitted with hypercalcemia. PMHx: lung cancer, pathologic femur fracture, depression, tremor, others noted. Pt with very poor intake over the past week (0% of trays-- she refuses to look at the tray at times). Discussed appetite. Pt denies N/V/D/C or taste changes. She admits that she has no desire for food. She often asks the RN to take away the tray before she looks at it. Reinforced the importance of nutrition as she undergoes treatment and pt seems to understand this. She is not drinking Ensure Compact supplements, but is willing to try adding ice cream to them prn (RN to assist at the bedside if she wants this-- she does not want it to be an automatic order). Will adjust order to q day (220 kcal, 9 g protein).   Discussed eating smaller amounts throughout the day and she is agreeable to high calorie/high protein snacks, so RD to order. She has been receiving XRT, which she no longer wishes to pursue, but has agree to begin immunotherapy today (nivolumab-- potential nutrition related side effects: N/V/D, decreased appetite, constipation). Estimated Nutrition Needs:   Kcals/day: 3090 Kcals/day (7433-8608 kcal/day (HBE x 1.2-1.4))  Protein: 92 g (1.2 g/kg)  Fluid: 1700 ml (~1 mL/kcal)     Based On: Lopez-Richardson  Weight Used: Actual wt (76.5 kg)    Pt expected to meet estimated nutrient needs:  []   Yes     [x]  No     Nutrition Diagnosis:   1. Inadequate protein-energy intake related to decreased appetite and interest in eating as evidenced by 0% of meals consumed x 5 days    Goals:     Pt to consume at least 1 supplement and 50% of all meals and snacks over the next 5-7 days     Monitoring & Evaluation:    - Total energy intake, Liquid meal replacement   - Weight/weight change     Previous Nutrition Goals Met:  N/A  Previous Recommendations:      N/A    Education & Discharge Needs:   [x] None Identified   [] Identified and addressed    [x] Participated in care plan, discharge planning, and/or interdisciplinary rounds        Cultural, Pentecostal and ethnic food preferences identified:  NONE      Skin Integrity: []Intact  [x]Other (excoriation)  Edema: []None [x]Other: 2+ (RLE)  Last BM: 5/8/17  Food Allergies: [x]None []Other    Anthropometrics:    Weight Loss Metrics 5/11/2017 4/5/2017 3/21/2017 12/29/2016 10/20/2016 3/9/2016 11/12/2014   Today's Wt 168 lb 10.4 oz 158 lb 11.7 oz 171 lb 12.8 oz 180 lb 184 lb 6.4 oz 177 lb 180 lb   BMI 30.85 kg/m2 29.03 kg/m2 30.43 kg/m2 32.92 kg/m2 32.66 kg/m2 31.36 kg/m2 31.89 kg/m2      Last 3 Recorded Weights in this Encounter    05/09/17 0436 05/11/17 0612 05/11/17 1259   Weight: 81.2 kg (179 lb) 76.5 kg (168 lb 10.4 oz) 76.5 kg (168 lb 10.4 oz)      Weight Source: Bed  Height: 5' 2\" (157.5 cm),    Body mass index is 30.85 kg/(m^2).   IBW : 52.2 kg (115 lb), % IBW (Calculated): 146.65 %   ,      Labs:  Lab Results   Component Value Date/Time    Sodium 135 05/11/2017 02:01 AM    Potassium 4.1 05/11/2017 02:01 AM    Chloride 104 05/11/2017 02:01 AM    CO2 24 05/11/2017 02:01 AM    Glucose 91 05/11/2017 02:01 AM    BUN 7 05/11/2017 02:01 AM    Creatinine 0.76 05/11/2017 02:01 AM    Calcium 6.7 05/11/2017 02:01 AM    Magnesium 1.4 05/07/2017 03:52 AM    Phosphorus 2.5 05/04/2017 01:45 AM    Albumin 1.7 05/11/2017 02:01 AM     Lab Results   Component Value Date/Time    Hemoglobin A1c 5.9 05/05/2017 05:12 AM     Lab Results   Component Value Date/Time    Glucose 91 05/11/2017 02:01 AM    Glucose (POC) 120 04/24/2017 03:55 AM      Lab Results   Component Value Date/Time    ALT (SGPT) 11 05/11/2017 02:01 AM    AST (SGOT) 16 05/11/2017 02:01 AM    Alk.  phosphatase 76 05/11/2017 02:01 AM    Bilirubin, direct 0.13 09/12/2016 08:17 AM    Bilirubin, total 0.5 05/11/2017 02:01 AM      Suni Ramirez, [address]

## 2017-05-11 NOTE — PROGRESS NOTES
Chief Complaint - follow-up and management of  Lung cancer       Exam  Gen Moderate distress; not confused; HEENT No mucositis; no thrush   Nodes No supraclavicular or cervical adenopathy   Chest / line sites No inflammation   Lungs Clear to auscultation on L; decreased 1/2 way up on the R   CV RRR   Abd Non-tender   Ext No edema on L; R leg swollen and tender in distal femur and R knee   Skin No rashes   Neuro Awake and alert   Other    Time-based care: [] 25-35 min    [] > 35 mi     (Total time with >50% spent counseling/coordination)  Discussed with the following:  []     []  Nursing Staff  [] Consultant    []  Family   []  Other:  Active Medical Problems  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 This one of the few treatments I can give her with her low counts. Will start tomorrow. CT shows dramatic progression of disease in the chest, pleura, and chest wall. Her pain is better controlled with 200 mcg fentanyl patch. I asked her to try  To finish her XRT to the knee today. I don't know if she did. I personally reviewed her CT images. There is hilar adenopathy on the R, pleural thickening/effusion/tumor, a large mass in R lateral chest wall. Her pancytopenia may be consumptive or due to poor production due to marrow infiltration; I don't think it is immune mediated. The solution is to treat the underlying disease. Serotonin release assay was negative.   [place] is grim, but we will try the nivolumab. Continue to transfuse as needed. Plts only 18k after transfusion from [facility]  Hgb went form 7.2 to 8.3 with 2 units. There may be some destruction, but the major problem may be suppressed production. T bili is NL, but LDH is 548, but haptoglobin is normal and retics only 2.1%. Current medications, progress notes, vital signs, radiology, and labs reviewed.     Ramirez Goodrich MD

## 2017-05-11 NOTE — PROGRESS NOTES
Patient reviewed in rounds. Patient is currently receiving Immunotherapy. There are no CM consults or needs at this time. CM will continue to follow and assist with disposition needs as they arise.     NITZA Rosenberg/JUAN ANTONIO  10:09 AM

## 2017-05-11 NOTE — PROGRESS NOTES
1447- Patient found with 02 cannula off of face. Patient increased difficulty to arouse, however was able to answer orientation questions and was alert and oriented x4. Vitals obtained and patient's O2 sat in the 70's. Nasal cannula applied, O2 increased to 4L. Dr. Zaynab Smyth paged and informed of patient's vital signs, interventions and mental state. Per Dr. Zaynab Smyth, administer Lasix 20mg IV once. IV Lasix administered, patient remains on continuous pulse ox. Orders also obtained by Laly Solorzano RN to discontinue patient's PCA and Fentanyl 100mcg patch. Patient's demands on PCA were 0 for past 8 hour period when PCA was discontinued. 1 100mcg Fentanyl patch was removed, 1 100mcg Fentanyl patch remains on patients back. Patient's O2 sats now 90-92.     1611- O2% now 95%, patient has Nivolumab running- tolerating well. Pt reports no pain, sob, discomfort. Continue to monitor. Because of above stated events, patient did not receive radiation today, Dr. Ilene Faust aware. 0- Paged MD on call, Dr. Larissa Gilliam, to update on patient's status and determine if any further interventions are required. Per Dr. Larissa Gilliam, STAT chest xray, STAT blood culture from PICC, Levaquin 500mg q24h. [address] with Dr. Jo Medrano per Dr. Claude Arts request to reconsult Hospitalist. Per Dr. Jo Medrano follow orders received from Dr. Larissa Gilliam, will come evaluate patient. 1817- RR called, patient O2 Sat staying at 83%.

## 2017-05-11 NOTE — PROGRESS NOTES
Day #1 of Zosyn  Indication:  HAP  Current regimen:  3.375 gm q6h  Abx regimen:  Levaquin + Zosyn  ID Following ?: NO  Frequency of BMP?: none  Recent Labs      17   0201  05/10/17   0154  17   1031   WBC  16.9*  16.3*  15.2*   CREA  0.76  0.40*  0.44*   BUN  7  3*  4*     Est CrCl: >20 ml/min   Temp (24hrs), Av.7 °F (36.5 °C), Min:97.5 °F (36.4 °C), Max:98.1 °F (36.7 °C)    Plan: Change to 4.5 gm q8h  per protocol

## 2017-05-12 NOTE — PROGRESS NOTES
Called to bedside to examine Ms. Varma as she is progressively hypoxic and confused. Her daughter, Adilene Bain, is at bedside as well. We discussed Ms. Varma's continued decline and need for intubation. Adilene Bain had Ms. Varma's Advanced Directive and it specifically states that Ms. Varma does not want intubation or CPR but would want medication for comfort. Adilene Bain and I discussed a plan to pursue comfort care as our main goal and to make her mother DNR. Adilene Bain was agreeable but obviously having a difficult time with her mother's impending death. Orders placed in [facility].     Adin Schilder, MD

## 2017-05-12 NOTE — PROGRESS NOTES
2000. Received care of patient, assessment completed. Pt on Hi Flow 60 Liters/ 100%, sats at 99 - 100%. Pt lethargic, answering questions slowly, oriented to self, place, year, month, not to day or to events. Somewhat labored respirations, very crackly breath sounds throughout lung fields. Skin slightly cool and diaphoretic. Soha Feng at bedside, with pt's Advanced Directive papers. Discussed this with AGUILA Palacios who deferred to pt's primary physician.   2100. Advanced Directive is indicating patient did not wish to have CPR, ventilator, tube fdg - these specifically mentioned along with no IVF, kidney dialysis, or antibiotics. This Advanced Directive was signed in Jan, 2017. Pt is not able to focus on any discussion at this time, and soha Feng is somewhat reluctant to change pt's current Full Code Status independently. She indicated she has other family arriving later. Khai Eason paged. 2225. Khai Eason asking that Hospitalist MD be involved in discussion about Code Status for pt. Conversation ongoing with daughter and patient. Pt continues to be less responsive with increasing resp distress. 2315. Spoke with , update provided. He requests to speak directly with Khai Eason, phone numbers provided.   2320. Khai Eason returned call, spoke with daughter Kali Gao. Daughter expressing wishes that patient is comfort care, with no further interventions. Code status changed to DNR without exceptions. 0000.  here to see patient. Pt minimally responsive, opening eyes briefly to voice and to stimulus, but no verbal response, no command following. 0030. Morphine and robinol given, rhonchi less with meds. 0100. Ativan given for restlessness. Daughter remains at bedside, other family members in waiting area. 0330. HR dropping from 110's to 60's. Daughter, Kali Gao at bedside, other family members now present as well.  [phone]. Asystole by monitor.  Pt without respiratioins, no heart beat. Ns Supervisor, MD, [facility] paged. 2402. Ross Garibay, NP here to pronounce pt.   0500. Spoke with Arin Chowdhury from [facility]. Eye prep completed per her request: NS drops to eyes, ice pack to closed eyes, and HOB elevated to ~30 degrees. Pt cleared for release to [facility], but is not to be released to  home until further notice from [facility].   0700. Pt transported to [facility].

## 2017-05-12 NOTE — PROGRESS NOTES
Paged by Nurse [place] to room 8555 where patient had just . Upon arrival family was departing. No spiritual needs indicated. Spoke briefly with Nurse [place] and acknowledged her kindness to this patient and family. [facility] Staff  (Suzanne Shaffer Patient Care Specialist)   Paging Service [phone])

## 2017-05-12 NOTE — PROCEDURES
[facility]  *** FINAL REPORT ***    Name: Janey Leal  MRN: [id number]    Inpatient  : 1960  HIS Order #: [id number]  [address] Visit #: [id number]  Date: 11 May 2017    TYPE OF TEST: Peripheral Venous Testing    REASON FOR TEST  R/O DVT    Right Leg:-  Deep venous thrombosis:           No  Superficial venous thrombosis:    No  Deep venous insufficiency:        Not examined  Superficial venous insufficiency: Not examined    Left Leg:-  Deep venous thrombosis:           No  Superficial venous thrombosis:    No  Deep venous insufficiency:        Not examined  Superficial venous insufficiency: Not examined      INTERPRETATION/FINDINGS  PROCEDURE:  Color duplex ultrasound imaging of lower extremity veins. FINDINGS:       Right: The common femoral, deep femoral, femoral, popliteal,  posterior tibial, peroneal, and great saphenous are patent and without   evidence of thrombus;  each is fully compressible and there is no  narrowing of the flow channel on color Doppler imaging. Phasic flow  is observed in the common femoral vein. Left:   The common femoral, deep femoral, femoral, popliteal,  posterior tibial, peroneal, and great saphenous are patent and without   evidence of thrombus;  each is fully compressible and there is no  narrowing of the flow channel on color Doppler imaging. Phasic flow  is observed in the common femoral vein. IMPRESSION:  No evidence of right or left lower extremity vein  thrombosis where visualized. Limited test due to patient position. ADDITIONAL COMMENTS    I have personally reviewed the data relevant to the interpretation of  this  study.     TECHNOLOGIST: Kian Payton RVT  Signed: 2017 10:21 PM    PHYSICIAN: Rodríguez Traore MD  Signed: 2017 08:05 AM

## 2017-05-12 NOTE — PROGRESS NOTES
Called to examine patient who has . No response to verbal and tactile stimuli. No respiratory effort. Absent heart sounds and pulses. Pupils fixed and dilated. Patient pronounced dead at 4:17 AM hours. Patient's family was  available at the bedside at the time of passing. Support provided.   Cassidy Cardona NP

## 2017-05-15 ENCOUNTER — DOCUMENTATION ONLY (OUTPATIENT)
Dept: INTERNAL MEDICINE CLINIC | Age: 57
End: 2017-05-15

## 2017-05-15 NOTE — PROGRESS NOTES
Patient on [place] report dated 17 and the [place] report dated 5/15/17. Patient  17 at 0417. Hospital episode resolved at this time.

## 2017-05-17 LAB
BACTERIA SPEC CULT: NORMAL
GRAM STN SPEC: NORMAL
SERVICE CMNT-IMP: NORMAL
SERVICE CMNT-IMP: NORMAL

## 2017-06-11 NOTE — DISCHARGE SUMMARY
Pt was awake and oriented on the 10th, though critically ill. On the 11th, she had a doppler exam which was negative for   LE DVT. However, she became progressively hypoxic and was transferred to the ICU. When it became clear that  She would require intubation to support her, discussions were held with her daughter who decided that Richie Avendano  Would not want to undergo invasive support given the aggressiveness of her disease. She was kept comfortable and she passed away   On the 12th. 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 This one of the few treatments I can give her with her low counts. Will start tomorrow. CT shows dramatic progression of disease in the chest, pleura, and chest wall. Her pain is better controlled with 200 mcg fentanyl patch. I asked her to try  To finish her XRT to the knee today. I don't know if she did. I personally reviewed her CT images. There is hilar adenopathy on the R, pleural thickening/effusion/tumor, a large mass in R lateral chest wall. Her pancytopenia may be consumptive or due to poor production due to marrow infiltration; I don't think it is immune mediated. The solution is to treat the underlying disease. Serotonin release assay was negative. [place] is grim, but we will try the nivolumab. Continue to transfuse as needed. Plts only 18k after transfusion from [facility]  Hgb went form 7.2 to 8.3 with 2 units. There may be some destruction, but the major problem may be suppressed production. T bili is NL, but LDH is 548, but haptoglobin is normal and retics only 2.1%. Current medications, progress notes, vital signs, radiology, and labs reviewed.     Tera Oropeza MD

## 2018-11-13 NOTE — PROGRESS NOTES
Bedside and Verbal shift change report given to Afshin Cuadra (oncoming nurse) by Sylvain Mccormick RN (offgoing nurse). Report included the following information SBAR, Kardex, Intake/Output, MAR and Med Rec Status. Self

## 2022-03-15 NOTE — PROGRESS NOTES
Problem: Self Care Deficits Care Plan (Adult)  Goal: *Acute Goals and Plan of Care (Insert Text)  Occupational Therapy Goals  Initiated 4/25/2017     1. Patient will perform lower body dressing using AE at supervision/set-up level within 7 days. 2. Patient will perform toilet transfers at supervision/set-up level maintaining TTWB on the RW using Walkers, Type: RW within 7 days. 3. Patient will perform all aspects of toileting at supervision/set-up level within 7 days. 4. Patient will demonstrate TTWB precautions without cues within 7 days. 5. Patient will participate in [facility] for cognitive assessment within 7 days. 6. Patient will complete UE AROM HEP using theraband with supervision within 7 days. OCCUPATIONAL THERAPY TREATMENT  Patient: Suzi Mijares (45 y.o. female)  Date: 4/28/2017  Diagnosis: Altered mental status  Hypercalcemia Hypercalcemia       Precautions: TTWB      ASSESSMENT:  Pt making steady progress with acute therapy. Pt received alert in bed, oriented x 4, agreeable for therapy. Pt with appropriate affect this afternoon and agreeable to cognitive assessment. Pt participated in the [address] [place] [address] which is a rapid screening tool that assesses: attention/concentration, executive functions, memory, language, visuoconstructional skills, conceptual thinking, calculations and orientation. Pt scored 24/30; normal score is 26/30. Pt scored lower in areas of visuospatial/executive, language and delayed recall. Pt able to identify areas of deficits, however, insight waxes; pt appears to mask her cognitive deficits well with deflection. Education provided on cognitive activities to address areas of improvement; pt verbalized understanding and took out iPad to work on \"Words with Friends\". 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      Progression toward goals:  [X] Improving appropriately and progressing toward goals  [ ]       Improving slowly and progressing toward goals  [ ]       Not making progress toward goals and plan of care will be adjusted       PLAN:  Patient continues to benefit from skilled intervention to address the above impairments. Continue treatment per established plan of care. Discharge Recommendations:  Rehab  Further Equipment Recommendations for Discharge:  TBD at rehab       SUBJECTIVE:   Patient stated Radha Silva was at that rehab place that starts with a M. Pt admitted from [facility]. OBJECTIVE DATA SUMMARY:   Cognitive/Behavioral Status:  Neurologic State: Alert; Appropriate for age  Orientation Level: Oriented X4  Cognition: Follows commands;Memory loss  Perception: Appears intact  Perseveration: No perseveration noted  Safety/Judgement: Awareness of environment     Functional Mobility and Transfers for ADLs:  Bed Mobility:     Pt received/remained in bed     ADL Intervention:     Cognitive Retraining  Organizing/Sequencing: Breaking task down  Safety/Judgement: Awareness of environment      [place] Cognitive Assessment [facility]):      Patient scored 24/30. The patient scored lower in areas of visuospatial/executive functioning, language, and delayed memory. Confounding factor(s) include: admitted for AMS, dx CA. Percentage of impairment CH  0% CI  1-19% CJ  20-39% CK  40-59% CL  60-79% CM  80-99% CN  100%   [place] Score 0-30 30 24-29 18-23 12-17 6-11 1-5 0       The [place] Cognitive Assessment [facility]) is designed to assess cognition in areas of visuospatial, executive, naming, memory, attention, language, abstraction, delayed recall, and orientation. Score of greater than or equal to 26/30 is considered normal cognition. Score of less than 26 indicates mild cognitive impairment. Score of 16 or lower indicates severe cognitive impairment.   Toyin Cortez, ROBERT, [facility]., JENNIFER [place] (2005). The [facility] Cognitive Assessment, MoCA: A brief screening tool for mild cognitive impairment. Journal of the Randall Oksana, 45, [phone]. Activity Tolerance:   VSS. Please refer to the flowsheet for vital signs taken during this treatment.   After treatment:   [ ] Patient left in no apparent distress sitting up in chair  [X] Patient left in no apparent distress in bed  [X] Call bell left within reach  [X] Nursing notified  [ ] Caregiver present  [ ] Bed alarm activated      COMMUNICATION/COLLABORATION:   The patients plan of care was discussed with: Physical Therapist and Registered Nurse     Zoey Medina OT  Time Calculation: 33 mins 02/2022

## 2022-05-11 NOTE — ROUTINE PROCESS
0700: TRANSFER - IN REPORT:    Verbal report received from Jesus Paniagua, Counts include 234 beds at the [facility] (name) on [employer]  being received from ED (unit) for urgent transfer      Report consisted of patients Situation, Background, Assessment and   Recommendations(SBAR). Information from the following report(s) SBAR, Kardex, ED Summary, Procedure Summary, Intake/Output, MAR, Accordion, Recent Results, Med Rec Status, Cardiac Rhythm NSR and Alarm Parameters  was reviewed with the receiving nurse. Opportunity for questions and clarification was provided. Assessment completed upon patients arrival to unit and care assumed. 0730: Bedside and Verbal shift change report given to Geeta Colby RN (oncoming nurse) by Jo Ann Chacon RN (offgoing nurse). Report included the following information SBAR, Kardex, ED Summary, Procedure Summary, Intake/Output, MAR, Accordion, Recent Results, Med Rec Status, Cardiac Rhythm NSR and Alarm Parameters .
2000. Bedside and Verbal shift change report given to [address] (oncoming nurse) by Flex Lo (offgoing nurse). Report included the following information SBAR, Kardex, ED Summary, OR Summary, Procedure Summary, Intake/Output, MAR, Accordion, Recent Results, Med Rec Status, Cardiac Rhythm sinus tachy. and Alarm Parameters .
Bedside and Verbal shift change report given to Guamaniankasey Lazo RN (oncoming nurse) by Isaac Patton RN (offgoing nurse). Report included the following information SBAR, Kardex, Intake/Output, MAR, Recent Results and Cardiac Rhythm NSR.
I have reviewed discharge instructions with the patient. The patient verbalized understanding.
Primary Nurse Crispin Hough RN and Aden Oppenheim, RN performed a dual skin assessment on this patient Impairment noted- see wound doc flow sheet  Yg score is 14
Primary Nurse Mita Solano RN and Denise Champagne RN performed a dual skin assessment on this patient No pressure ulcers noted  Yg score is 10
TRANSFER - IN REPORT:    Verbal report received from [place], [facility], (name) on [employer]  being received from 6E(unit) for urgent transfer      Report consisted of patients Situation, Background, Assessment and   Recommendations(SBAR). Information from the following report(s) SBAR, Kardex, [facility] and Recent Results was reviewed with the receiving nurse. Opportunity for questions and clarification was provided. Assessment completed upon patients arrival to unit and care assumed.
TRANSFER - OUT REPORT:    Verbal report given to FRENCH Farr(name) on Evelio Raymond  being transferred to [address] [place] [address](unit) for routine progression of care       Report consisted of patients Situation, Background, Assessment and   Recommendations(SBAR). Information from the following report(s) SBAR, Kardex and MAR was reviewed with the receiving nurse. Lines:       Opportunity for questions and clarification was provided.       Patient transported with:   Attainia
11-May-2022 13:20

## (undated) DEVICE — 3000CC GUARDIAN II: Brand: GUARDIAN

## (undated) DEVICE — DRAPE XR C ARM 41X74IN LF --

## (undated) DEVICE — Device

## (undated) DEVICE — BIT DRL L330MM DIA4.2MM CALIB 100MM 3 FLUT QUIK CPL

## (undated) DEVICE — 4.2MM THREE-FLUTED DRILL BIT QC/NEEDLE POINT/145MM

## (undated) DEVICE — REM POLYHESIVE ADULT PATIENT RETURN ELECTRODE: Brand: VALLEYLAB

## (undated) DEVICE — HANDLE LT SNAP ON ULT DURABLE LENS FOR TRUMPF ALC DISPOSABLE

## (undated) DEVICE — SUT ETHLN 3-0 18IN PS1 BLK --

## (undated) DEVICE — 1200 GUARD II KIT W/5MM TUBE W/O VAC TUBE: Brand: GUARDIAN

## (undated) DEVICE — WATERPROOF, BACTERIA PROOF DRESSING WITH ABSORBENT SEE THROUGH PAD: Brand: OPSITE POST-OP VISIBLE 10X8CM CTN 20

## (undated) DEVICE — (D)PREP SKN CHLRAPRP APPL 26ML -- CONVERT TO ITEM 371833

## (undated) DEVICE — STERILE POLYISOPRENE POWDER-FREE SURGICAL GLOVES: Brand: PROTEXIS

## (undated) DEVICE — SUTURE VCRL SZ 0 L27IN ABSRB UD L36MM CT-1 1/2 CIR J260H

## (undated) DEVICE — SLIM BODY SKIN STAPLER: Brand: APPOSE ULC

## (undated) DEVICE — ROD RMR L950MM DIA2.5MM W/ EXTN BALL TIP

## (undated) DEVICE — INFECTION CONTROL KIT SYS

## (undated) DEVICE — CONVERTORS STOCKINETTE: Brand: CONVERTORS

## (undated) DEVICE — DEVON™ KNEE AND BODY STRAP 60" X 3" (1.5 M X 7.6 CM): Brand: DEVON

## (undated) DEVICE — STERILE POLYISOPRENE POWDER-FREE SURGICAL GLOVES WITH EMOLLIENT COATING: Brand: PROTEXIS

## (undated) DEVICE — SOLUTION IV 1000ML 0.9% SOD CHL

## (undated) DEVICE — WATERPROOF, BACTERIA PROOF DRESSING WITH ABSORBENT SEE THROUGH PAD: Brand: OPSITE POST-OP VISIBLE 15X10CM CTN 20

## (undated) DEVICE — GUIDEWIRE ORTH L290MM DIA3.2MM FOR RG AG EXPERT FEM NAILING

## (undated) DEVICE — Z CONVERTED USE 2107985 COVER FLROSCP W36XL28IN 4 SIDE ADH